# Patient Record
Sex: MALE | Race: WHITE | NOT HISPANIC OR LATINO | Employment: OTHER | URBAN - METROPOLITAN AREA
[De-identification: names, ages, dates, MRNs, and addresses within clinical notes are randomized per-mention and may not be internally consistent; named-entity substitution may affect disease eponyms.]

---

## 2021-06-16 PROBLEM — R00.1 SINUS BRADYCARDIA: Status: ACTIVE | Noted: 2018-04-22

## 2021-06-16 PROBLEM — R79.89 ABNORMAL LFTS: Status: ACTIVE | Noted: 2018-04-22

## 2021-06-16 PROBLEM — M71.10 SEPTIC BURSITIS: Status: ACTIVE | Noted: 2018-04-21

## 2023-01-16 ENCOUNTER — APPOINTMENT (OUTPATIENT)
Dept: CT IMAGING | Facility: HOSPITAL | Age: 68
End: 2023-01-16
Attending: EMERGENCY MEDICINE
Payer: COMMERCIAL

## 2023-01-16 ENCOUNTER — HOSPITAL ENCOUNTER (EMERGENCY)
Facility: HOSPITAL | Age: 68
Discharge: HOME | End: 2023-01-16
Attending: EMERGENCY MEDICINE | Admitting: EMERGENCY MEDICINE
Payer: COMMERCIAL

## 2023-01-16 DIAGNOSIS — N20.1 URETEROLITHIASIS: ICD-10-CM

## 2023-01-16 LAB
ALBUMIN UR-MCNC: 20 MG/DL
ANION GAP SERPL CALCULATED.3IONS-SCNC: 15 MMOL/L (ref 7–15)
APPEARANCE UR: CLEAR
BASOPHILS # BLD AUTO: 0 10E3/UL (ref 0–0.2)
BASOPHILS NFR BLD AUTO: 0 %
BILIRUB UR QL STRIP: NEGATIVE
BUN SERPL-MCNC: 28.8 MG/DL (ref 8–23)
CALCIUM SERPL-MCNC: 9.5 MG/DL (ref 8.8–10.2)
CHLORIDE SERPL-SCNC: 104 MMOL/L (ref 98–107)
COLOR UR AUTO: ABNORMAL
CREAT SERPL-MCNC: 1.97 MG/DL (ref 0.67–1.17)
DEPRECATED HCO3 PLAS-SCNC: 19 MMOL/L (ref 22–29)
EOSINOPHIL # BLD AUTO: 0.1 10E3/UL (ref 0–0.7)
EOSINOPHIL NFR BLD AUTO: 1 %
ERYTHROCYTE [DISTWIDTH] IN BLOOD BY AUTOMATED COUNT: 12.5 % (ref 10–15)
GFR SERPL CREATININE-BSD FRML MDRD: 36 ML/MIN/1.73M2
GLUCOSE SERPL-MCNC: 103 MG/DL (ref 70–99)
GLUCOSE UR STRIP-MCNC: NEGATIVE MG/DL
HCT VFR BLD AUTO: 44.6 % (ref 40–53)
HGB BLD-MCNC: 14.9 G/DL (ref 13.3–17.7)
HGB UR QL STRIP: ABNORMAL
IMM GRANULOCYTES # BLD: 0.1 10E3/UL
IMM GRANULOCYTES NFR BLD: 1 %
KETONES UR STRIP-MCNC: NEGATIVE MG/DL
LEUKOCYTE ESTERASE UR QL STRIP: NEGATIVE
LIPASE SERPL-CCNC: 38 U/L (ref 13–60)
LYMPHOCYTES # BLD AUTO: 1 10E3/UL (ref 0.8–5.3)
LYMPHOCYTES NFR BLD AUTO: 8 %
MCH RBC QN AUTO: 29.6 PG (ref 26.5–33)
MCHC RBC AUTO-ENTMCNC: 33.4 G/DL (ref 31.5–36.5)
MCV RBC AUTO: 89 FL (ref 78–100)
MONOCYTES # BLD AUTO: 1.1 10E3/UL (ref 0–1.3)
MONOCYTES NFR BLD AUTO: 9 %
MUCOUS THREADS #/AREA URNS LPF: PRESENT /LPF
NEUTROPHILS # BLD AUTO: 10.1 10E3/UL (ref 1.6–8.3)
NEUTROPHILS NFR BLD AUTO: 81 %
NITRATE UR QL: NEGATIVE
NRBC # BLD AUTO: 0 10E3/UL
NRBC BLD AUTO-RTO: 0 /100
PH UR STRIP: 5 [PH] (ref 5–7)
PLATELET # BLD AUTO: 154 10E3/UL (ref 150–450)
POTASSIUM SERPL-SCNC: 4.1 MMOL/L (ref 3.4–5.3)
RBC # BLD AUTO: 5.03 10E6/UL (ref 4.4–5.9)
RBC URINE: 3 /HPF
SODIUM SERPL-SCNC: 138 MMOL/L (ref 136–145)
SP GR UR STRIP: 1.02 (ref 1–1.03)
UROBILINOGEN UR STRIP-MCNC: <2 MG/DL
WBC # BLD AUTO: 12.4 10E3/UL (ref 4–11)
WBC URINE: <1 /HPF

## 2023-01-16 PROCEDURE — 96374 THER/PROPH/DIAG INJ IV PUSH: CPT

## 2023-01-16 PROCEDURE — 74176 CT ABD & PELVIS W/O CONTRAST: CPT

## 2023-01-16 PROCEDURE — 99285 EMERGENCY DEPT VISIT HI MDM: CPT | Mod: 25

## 2023-01-16 PROCEDURE — 85025 COMPLETE CBC W/AUTO DIFF WBC: CPT | Performed by: EMERGENCY MEDICINE

## 2023-01-16 PROCEDURE — 81001 URINALYSIS AUTO W/SCOPE: CPT | Performed by: EMERGENCY MEDICINE

## 2023-01-16 PROCEDURE — 36415 COLL VENOUS BLD VENIPUNCTURE: CPT | Performed by: EMERGENCY MEDICINE

## 2023-01-16 PROCEDURE — 80048 BASIC METABOLIC PNL TOTAL CA: CPT | Performed by: EMERGENCY MEDICINE

## 2023-01-16 PROCEDURE — 83690 ASSAY OF LIPASE: CPT | Performed by: EMERGENCY MEDICINE

## 2023-01-16 PROCEDURE — 250N000011 HC RX IP 250 OP 636: Performed by: EMERGENCY MEDICINE

## 2023-01-16 RX ORDER — MORPHINE SULFATE 4 MG/ML
4 INJECTION, SOLUTION INTRAMUSCULAR; INTRAVENOUS EVERY 30 MIN PRN
Status: DISCONTINUED | OUTPATIENT
Start: 2023-01-16 | End: 2023-01-16 | Stop reason: HOSPADM

## 2023-01-16 RX ORDER — OXYCODONE AND ACETAMINOPHEN 5; 325 MG/1; MG/1
1 TABLET ORAL EVERY 6 HOURS PRN
Qty: 12 TABLET | Refills: 0 | Status: SHIPPED | OUTPATIENT
Start: 2023-01-16 | End: 2023-01-19

## 2023-01-16 RX ADMIN — MORPHINE SULFATE 4 MG: 4 INJECTION, SOLUTION INTRAMUSCULAR; INTRAVENOUS at 10:30

## 2023-01-16 ASSESSMENT — ACTIVITIES OF DAILY LIVING (ADL)
ADLS_ACUITY_SCORE: 35

## 2023-01-16 ASSESSMENT — ENCOUNTER SYMPTOMS
VOMITING: 1
FLANK PAIN: 1
NAUSEA: 1

## 2023-01-16 NOTE — ED NOTES
Pt said that the pain is better. His wife is here. He said that he has a kidney disorder. He is not sure if he has had a kidney stone in the past or not.

## 2023-01-16 NOTE — ED TRIAGE NOTES
Pt brought in by EMS for rt flank pain stat started about 2 am. He took 2 old oxy's for the pain that helped but he has been very nauseated and he was given 8 mg of zofran for EMS. Pain still there and nausea is slightly better after meds.      Triage Assessment     Row Name 01/16/23 0845       Triage Assessment (Adult)    Airway WDL WDL       Respiratory WDL    Respiratory WDL WDL       Skin Circulation/Temperature WDL    Skin Circulation/Temperature WDL WDL       Cardiac WDL    Cardiac WDL X;rhythm    Pulse Rate & Regularity bradycardic       Peripheral/Neurovascular WDL    Peripheral Neurovascular WDL WDL       Cognitive/Neuro/Behavioral WDL    Cognitive/Neuro/Behavioral WDL WDL

## 2023-01-16 NOTE — DISCHARGE INSTRUCTIONS
Encourage fluids.  Heat to sore areas can help with pain.  Tylenol or ibuprofen can help with pain.  1 Percocet every 6 hours if needed for stronger pain.  Do not drive with this.  Have someone drive you home today.  Strain your urine and save any stones for analysis.  Referral to kidney [facility] [place], they should contact you for appointment in the next day or 2.  See your doctor return if worse or problems.

## 2023-01-16 NOTE — ED PROVIDER NOTES
EMERGENCY DEPARTMENT ENCOUNTER      NAME: Maurice Biswas  AGE: 67 year old male  YOB: 1955  MRN: [id number]  EVALUATION DATE & TIME: 2023  8:41 AM    PCP: No primary care provider on file.    ED PROVIDER: Dwain Tan M.D.      Chief Complaint   Patient presents with     Flank Pain     Flank pain.    FINAL IMPRESSION:  1.  Acute left flank pain.  2.  Left ureterolithiasis.    ED COURSE & MEDICAL DECISION MAKIN:52 AM I met with the patient to gather history and to perform my initial exam. We discussed plans for the ED course, including diagnostic testing and treatment. PPE worn: cloth mask.  Patient with 2 to 3 days of left flank pain.  No associated symptoms.  Nothing makes this better or worse.  12:46 PM I reevaluated and updated the patient.  Patient pain-free.  Patient was able to go home and prescription for Percocet and referral to kidney [facility].  They are in agreement with the plan.      Pertinent Labs & Imaging studies reviewed. (See chart for details)    67 year old male presents to the Emergency Department for evaluation of left flank pain.    At the conclusion of the encounter I discussed the results of all of the tests and the disposition. The questions were answered. The patient or family acknowledged understanding and was agreeable with the care plan.     Medical Decision Making    History:    Supplemental history from:     External Record(s) reviewed: In-patient and out-patient computer records reviewed.    Work Up:    Chart documentation includes differential considered and any EKGs or imaging independently interpreted by provider.  Differential diagnosis includes muscle strain or contusion, kidney problem, infection, abdominal bleeding, pancreatitis, etc.    In additional to work up documented, I considered the following work up: See chart documentation, if applicable.    External consultation:    Discussion of management with another provider: See chart  documentation, if applicable    Complicating factors:    Care impacted by chronic illness:     Care affected by social determinants of health: N/A    Disposition considerations: Anticipate probable discharge home.    MEDICATIONS GIVEN IN THE EMERGENCY:  Medications   morphine (PF) injection 4 mg (4 mg Intravenous Given 1/16/23 1030)       NEW PRESCRIPTIONS STARTED AT TODAY'S ER VISIT  New Prescriptions    No medications on file          =================================================================    HPI    Patient information was obtained from: Patient, RN    Use of : N/A        Maurice Biswas is a 67 year old male with a pertinent history of hypertension, CKD 3b who presents to this ED by EMS for evaluation of flank pain. Patient endorses stabbing left sided flank pain for the past 2 day persisting into today. No pain with deep inspiration. His pain does not radiate into his groin. This morning at ~2 AM, he took 2 old oxycodone with some relief to his pain. En route to the ED, he was given 8 mg Zofran by EMS. Patient had a partially filled emesis bag during the interview.    Does not identify any waxing or waning symptoms otherwise, exacerbating or alleviating features,associated symptoms except as mentioned.    REVIEW OF SYSTEMS   Review of Systems   Gastrointestinal: Positive for nausea and vomiting.   Genitourinary: Positive for flank pain (left sided).   All other systems reviewed and are negative.       PAST MEDICAL HISTORY:  History reviewed. No pertinent past medical history.    PAST SURGICAL HISTORY:  History reviewed. No pertinent surgical history.        CURRENT MEDICATIONS:    butalbital-acetaminophen-caffeine (FIORICET, ESGIC) -40 mg per tablet  fluticasone (FLONASE) 50 mcg/actuation nasal spray  levothyroxine (SYNTHROID, LEVOTHROID) 100 MCG tablet        ALLERGIES:  No Known Allergies    FAMILY HISTORY:  History reviewed. No pertinent family history.    SOCIAL HISTORY:   Social  History     Socioeconomic History     Marital status:      Spouse name: None     Number of children: None     Years of education: None     Highest education level: None   Tobacco Use     Smoking status: Never     Smokeless tobacco: Never   Substance and Sexual Activity     Alcohol use: No     Drug use: No     Sexual activity: Yes     Partners: Female   No drugs, alcohol, or tobacco.    VITALS:  There were no vitals taken for this visit.    PHYSICAL EXAM    Vital Signs:  There were no vitals taken for this visit.  General:  On entering the room he is in no apparent distress.    Neck:  Neck supple with full range of motion and nontender.    Back:  Back and spine are nontender.  No costovertebral angle tenderness.  No CVA angle tenderness, there is left flank pain.  HEENT:  Oropharynx clear with moist mucous membranes.  HEENT unremarkable.    Pulmonary:  Chest clear to auscultation without rhonchi rales or wheezing.    Cardiovascular:  Cardiac regular rate and rhythm without murmurs rubs or gallops.    Abdomen:  Abdomen soft nontender.  There is no rebound or guarding.    Muskuloskeletal:  he moves all 4 without any difficulty and has normal neurovascular exams.  Extremities without clubbing, cyanosis, or edema.  Legs and calves are nontender.    Neuro:  he is alert and oriented ×3 and moves all extremities symmetrically.    Psych:  Normal affect.    Skin:  Unremarkable and warm and dry.       LAB:  All pertinent labs reviewed and interpreted.  Labs Ordered and Resulted from Time of ED Arrival to Time of ED Departure   ROUTINE UA WITH MICROSCOPIC REFLEX TO CULTURE - Abnormal       Result Value    Color Urine Light Yellow      Appearance Urine Clear      Glucose Urine Negative      Bilirubin Urine Negative      Ketones Urine Negative      Specific Gravity Urine 1.022      Blood Urine 0.03 mg/dL (*)     pH Urine 5.0      Protein Albumin Urine 20 (*)     Urobilinogen Urine <2.0      Nitrite Urine Negative       Leukocyte Esterase Urine Negative      Mucus Urine Present (*)     RBC Urine 3 (*)     WBC Urine <1     BASIC METABOLIC PANEL - Abnormal    Sodium 138      Potassium 4.1      Chloride 104      Carbon Dioxide (CO2) 19 (*)     Anion Gap 15      Urea Nitrogen 28.8 (*)     Creatinine 1.97 (*)     Calcium 9.5      Glucose 103 (*)     GFR Estimate 36 (*)    CBC WITH PLATELETS AND DIFFERENTIAL - Abnormal    WBC Count 12.4 (*)     RBC Count 5.03      Hemoglobin 14.9      Hematocrit 44.6      MCV 89      MCH 29.6      MCHC 33.4      RDW 12.5      Platelet Count 154      % Neutrophils 81      % Lymphocytes 8      % Monocytes 9      % Eosinophils 1      % Basophils 0      % Immature Granulocytes 1      NRBCs per 100 WBC 0      Absolute Neutrophils 10.1 (*)     Absolute Lymphocytes 1.0      Absolute Monocytes 1.1      Absolute Eosinophils 0.1      Absolute Basophils 0.0      Absolute Immature Granulocytes 0.1      Absolute NRBCs 0.0     LIPASE - Normal    Lipase 38         RADIOLOGY:  Reviewed all pertinent imaging. Please see official radiology report.  Abd/pelvis CT no contrast - Stone Protocol   Final Result   IMPRESSION:    1.  2 mm stone at the left ureterovesical junction causes mild hydronephrosis.   2.  Colonic diverticulosis.                    EKG:    Sinus bradycardia 49, otherwise normal EKG.  Compared to April 2018 that showed a similar rhythm with heart rate of 54.  Chart indicates patient has a history of sinus bradycardia.    I have independently reviewed and interpreted the EKG(s) documented above.    PROCEDURES:         I, Obie Slaughter, am serving as a scribe to document services personally performed by Dr. Tan based on my observation and the provider's statements to me. I, Dwain Tan MD attest that Obie Slaughter is acting in a scribe capacity, has observed my performance of the services and has documented them in accordance with my direction.    Dwain Tan M.D.  Emergency  Medicine  [facility] EMERGENCY DEPARTMENT  [address] [place] [id number]  [phone]  Dept: [phone]     Dwain Tan MD  01/16/23 1250

## 2023-01-16 NOTE — ED NOTES
Bed: JNED35  Expected date: 1/16/23  Expected time: 8:17 AM  Means of arrival:   Comments:  Flank/hip pain/[place]

## 2023-08-29 ENCOUNTER — TRANSCRIBE ORDERS (OUTPATIENT)
Dept: OTHER | Age: 68
End: 2023-08-29

## 2023-08-29 DIAGNOSIS — G70.00 MYASTHENIA GRAVIS (H): Primary | ICD-10-CM

## 2023-08-30 ENCOUNTER — HOSPITAL ENCOUNTER (INPATIENT)
Facility: HOSPITAL | Age: 68
LOS: 6 days | Discharge: HOME | DRG: 057 | End: 2023-09-05
Attending: EMERGENCY MEDICINE | Admitting: EMERGENCY MEDICINE
Payer: COMMERCIAL

## 2023-08-30 DIAGNOSIS — I10 PRIMARY HYPERTENSION: Primary | ICD-10-CM

## 2023-08-30 DIAGNOSIS — G70.00 MYASTHENIA GRAVIS (H): ICD-10-CM

## 2023-08-30 LAB
ALBUMIN SERPL BCG-MCNC: 4.5 G/DL (ref 3.5–5.2)
ALP SERPL-CCNC: 59 U/L (ref 40–129)
ALT SERPL W P-5'-P-CCNC: 49 U/L (ref 0–70)
ANION GAP SERPL CALCULATED.3IONS-SCNC: 12 MMOL/L (ref 7–15)
AST SERPL W P-5'-P-CCNC: 28 U/L (ref 0–45)
BASOPHILS # BLD AUTO: 0 10E3/UL (ref 0–0.2)
BASOPHILS NFR BLD AUTO: 0 %
BILIRUB SERPL-MCNC: 1.1 MG/DL
BUN SERPL-MCNC: 31.4 MG/DL (ref 8–23)
CALCIUM SERPL-MCNC: 9.5 MG/DL (ref 8.8–10.2)
CHLORIDE SERPL-SCNC: 103 MMOL/L (ref 98–107)
CREAT SERPL-MCNC: 1.36 MG/DL (ref 0.67–1.17)
DEPRECATED HCO3 PLAS-SCNC: 24 MMOL/L (ref 22–29)
EOSINOPHIL # BLD AUTO: 0 10E3/UL (ref 0–0.7)
EOSINOPHIL NFR BLD AUTO: 0 %
ERYTHROCYTE [DISTWIDTH] IN BLOOD BY AUTOMATED COUNT: 12.8 % (ref 10–15)
GFR SERPL CREATININE-BSD FRML MDRD: 56 ML/MIN/1.73M2
GLUCOSE BLDC GLUCOMTR-MCNC: 117 MG/DL (ref 70–99)
GLUCOSE SERPL-MCNC: 222 MG/DL (ref 70–99)
HBA1C MFR BLD: 5.4 %
HCT VFR BLD AUTO: 47.7 % (ref 40–53)
HGB BLD-MCNC: 15.2 G/DL (ref 13.3–17.7)
IMM GRANULOCYTES # BLD: 0 10E3/UL
IMM GRANULOCYTES NFR BLD: 0 %
LYMPHOCYTES # BLD AUTO: 0.4 10E3/UL (ref 0.8–5.3)
LYMPHOCYTES NFR BLD AUTO: 5 %
MCH RBC QN AUTO: 29 PG (ref 26.5–33)
MCHC RBC AUTO-ENTMCNC: 31.9 G/DL (ref 31.5–36.5)
MCV RBC AUTO: 91 FL (ref 78–100)
MONOCYTES # BLD AUTO: 0.1 10E3/UL (ref 0–1.3)
MONOCYTES NFR BLD AUTO: 2 %
NEUTROPHILS # BLD AUTO: 8.3 10E3/UL (ref 1.6–8.3)
NEUTROPHILS NFR BLD AUTO: 93 %
NRBC # BLD AUTO: 0 10E3/UL
NRBC BLD AUTO-RTO: 0 /100
PLATELET # BLD AUTO: 212 10E3/UL (ref 150–450)
POTASSIUM SERPL-SCNC: 4.6 MMOL/L (ref 3.4–5.3)
PROT SERPL-MCNC: 6.9 G/DL (ref 6.4–8.3)
RBC # BLD AUTO: 5.24 10E6/UL (ref 4.4–5.9)
SODIUM SERPL-SCNC: 139 MMOL/L (ref 136–145)
WBC # BLD AUTO: 8.9 10E3/UL (ref 4–11)

## 2023-08-30 PROCEDURE — 999N000157 HC STATISTIC RCP TIME EA 10 MIN

## 2023-08-30 PROCEDURE — 99223 1ST HOSP IP/OBS HIGH 75: CPT | Performed by: EMERGENCY MEDICINE

## 2023-08-30 PROCEDURE — 99223 1ST HOSP IP/OBS HIGH 75: CPT | Performed by: PSYCHIATRY & NEUROLOGY

## 2023-08-30 PROCEDURE — 120N000001 HC R&B MED SURG/OB

## 2023-08-30 PROCEDURE — 83036 HEMOGLOBIN GLYCOSYLATED A1C: CPT | Performed by: EMERGENCY MEDICINE

## 2023-08-30 PROCEDURE — 85004 AUTOMATED DIFF WBC COUNT: CPT | Performed by: EMERGENCY MEDICINE

## 2023-08-30 PROCEDURE — 82962 GLUCOSE BLOOD TEST: CPT

## 2023-08-30 PROCEDURE — 94150 VITAL CAPACITY TEST: CPT

## 2023-08-30 PROCEDURE — 99285 EMERGENCY DEPT VISIT HI MDM: CPT

## 2023-08-30 PROCEDURE — 36415 COLL VENOUS BLD VENIPUNCTURE: CPT | Performed by: EMERGENCY MEDICINE

## 2023-08-30 PROCEDURE — 80053 COMPREHEN METABOLIC PANEL: CPT | Performed by: EMERGENCY MEDICINE

## 2023-08-30 PROCEDURE — 250N000013 HC RX MED GY IP 250 OP 250 PS 637: Performed by: EMERGENCY MEDICINE

## 2023-08-30 RX ORDER — ASPIRIN 81 MG/1
81 TABLET ORAL DAILY
Status: DISCONTINUED | OUTPATIENT
Start: 2023-08-31 | End: 2023-09-05 | Stop reason: HOSPADM

## 2023-08-30 RX ORDER — B-COMPLEX WITH VITAMIN C
1 TABLET ORAL 2 TIMES DAILY WITH MEALS
COMMUNITY
End: 2024-07-01

## 2023-08-30 RX ORDER — HEPARIN SODIUM 5000 [USP'U]/.5ML
5000 INJECTION, SOLUTION INTRAVENOUS; SUBCUTANEOUS EVERY 12 HOURS
Status: DISCONTINUED | OUTPATIENT
Start: 2023-08-30 | End: 2023-09-05 | Stop reason: HOSPADM

## 2023-08-30 RX ORDER — LOSARTAN POTASSIUM 100 MG/1
100 TABLET ORAL DAILY
Status: ON HOLD | COMMUNITY
End: 2023-09-05

## 2023-08-30 RX ORDER — PYRIDOSTIGMINE BROMIDE 60 MG/1
1 TABLET ORAL 3 TIMES DAILY
COMMUNITY
End: 2023-09-21

## 2023-08-30 RX ORDER — PREDNISONE 20 MG/1
60 TABLET ORAL DAILY
Status: DISCONTINUED | OUTPATIENT
Start: 2023-08-31 | End: 2023-09-05 | Stop reason: HOSPADM

## 2023-08-30 RX ORDER — NICOTINE POLACRILEX 4 MG
15-30 LOZENGE BUCCAL
Status: DISCONTINUED | OUTPATIENT
Start: 2023-08-30 | End: 2023-09-05 | Stop reason: HOSPADM

## 2023-08-30 RX ORDER — DEXTROSE MONOHYDRATE 25 G/50ML
25-50 INJECTION, SOLUTION INTRAVENOUS
Status: DISCONTINUED | OUTPATIENT
Start: 2023-08-30 | End: 2023-09-05 | Stop reason: HOSPADM

## 2023-08-30 RX ORDER — PREDNISONE 20 MG/1
3 TABLET ORAL DAILY
Status: ON HOLD | COMMUNITY
Start: 2023-08-24 | End: 2023-09-05

## 2023-08-30 RX ORDER — LOSARTAN POTASSIUM 50 MG/1
100 TABLET ORAL DAILY
Status: DISCONTINUED | OUTPATIENT
Start: 2023-08-31 | End: 2023-08-31

## 2023-08-30 RX ORDER — AMLODIPINE BESYLATE 5 MG/1
5 TABLET ORAL DAILY
Status: DISCONTINUED | OUTPATIENT
Start: 2023-08-31 | End: 2023-08-31

## 2023-08-30 RX ORDER — LEVOTHYROXINE SODIUM 112 UG/1
112 TABLET ORAL DAILY
COMMUNITY
Start: 2023-08-03

## 2023-08-30 RX ORDER — ASPIRIN 81 MG/1
81 TABLET ORAL DAILY
COMMUNITY
End: 2024-01-10

## 2023-08-30 RX ORDER — CHLORAL HYDRATE 500 MG
2 CAPSULE ORAL 2 TIMES DAILY
COMMUNITY
Start: 2022-08-01

## 2023-08-30 RX ORDER — LEVOTHYROXINE SODIUM 112 UG/1
112 TABLET ORAL DAILY
Status: DISCONTINUED | OUTPATIENT
Start: 2023-08-31 | End: 2023-09-05 | Stop reason: HOSPADM

## 2023-08-30 RX ORDER — LIDOCAINE 40 MG/G
CREAM TOPICAL
Status: DISCONTINUED | OUTPATIENT
Start: 2023-08-30 | End: 2023-09-05 | Stop reason: HOSPADM

## 2023-08-30 RX ORDER — PYRIDOSTIGMINE BROMIDE 60 MG/1
60 TABLET ORAL 3 TIMES DAILY
Status: DISCONTINUED | OUTPATIENT
Start: 2023-08-30 | End: 2023-09-05 | Stop reason: HOSPADM

## 2023-08-30 RX ORDER — AMLODIPINE BESYLATE 5 MG/1
5 TABLET ORAL DAILY
Status: ON HOLD | COMMUNITY
End: 2023-09-05

## 2023-08-30 RX ADMIN — PYRIDOSTIGMINE BROMIDE 60 MG: 60 TABLET ORAL at 21:36

## 2023-08-30 ASSESSMENT — ACTIVITIES OF DAILY LIVING (ADL)
ADLS_ACUITY_SCORE: 35

## 2023-08-30 NOTE — CONSULTS
This is a neurological consultation    68-year-old admitted to hospital 8/30/2023    Chief complaint  Myasthenia gravis crisis      HPI  68-year-old presents to ER 8/30/2023 with increasing symptoms from his myasthenia gravis.  Has had the diagnosis for at least 2 years followed at the [facility]  Recently seen at the [facility] day before admission has been on prednisone 60 mg for couple of days.    Patient had increasing fatigue during the month of August has seen his primary regard to this.    Over the last week he has had difficulty with taking deep breaths  He has some swallowing difficulties  He has had some ptosis bilaterally left eye worse than right  He also has diplopia    Has difficulty taking a deep breath    Able to talk in long sentences  Able to swallow okay at bedside      Followed by Dr. Mcfarlane of [facility] [place] ([phone] for kidney disease  CKD 3a, Ig A nephropathy. 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  With these symptoms he has moved to more generalized myasthenia gravis    He was started on steroids fairly recently in August and ramped up fairly quickly  In some patients they can get slight worsening of their myasthenia gravis before it improves with a quick increase in prednisone.    Recommend short course of plasma exchange to bring things under control        Past medical history  Myasthenia gravis (November 2021)  CKD IgA nephropathy  Hypertension  Hypothyroidism  Prostatism/prostate cancer  Anemia  Psoriasis  Kidney  stone  Adenomatous polyp    Habits  Past smoker quit  Does not drink alcohol        Family history  Father colon cancer  Mother heart disease/hypertension  Maternal aunt heart disease  Sister hypertension      Work-up           8/30/2023  NIF       -40  VC       2.38    Labs  Sodium 139 potassium 4.6  BUN 31.4, creatinine 1.36  AST 28  Glucose 222  WBC 8.9, hemoglobin 15.2, platelets 212,000      Exam  Blood pressure 165/79, pulse 65, temperature 97.7  Blood pressure range 118//79  Pulse range 58-88  Tmax 97.7      Review of systems  Predominant positives and negatives  Generalized fatigue varies  Shortness of breath air hunger  Swallowing difficulties  Increased diplopia  Increased ptosis left greater than right    Maybe a little bit of chest pain  No actual headache    Has had increased saliva and diarrhea since being on increased doses of Mestinon which can be a side effect    Able to ambulate  Otherwise review of systems negative    General exam  Alert orient x3  HEENT significant ptosis left greater than right with some ophthalmoplegia  Lungs clear  Heart rate regular  Abdomen soft  Symmetrical pulses  No edema in the feet        Neurologic exam  Alert orient x3  Normal prosody of speech  Normal naming  Normal comprehension  Normal repetition  No aphasia  No neglect  Memory recall okay    Cranials 2 through 12  Significant left ptosis greater than right  Positive ophthalmoplegia brought out with fatiguing movements  Mild weakness of eye closure left greater than right  Mouth closure strong    Tongue twisters actually pretty good  No visual field cut    Neck flexors strong  Neck extensors strong    Upper extremities reported right over left  Deltoid 5/5  Biceps 5/5  Triceps 5/5  Wrist/finger extensors 5/5  Wrist/finger flexors 5/5  Intrinsic hand strength 5/5    No drift no tremor normal rapid alternating movements    Lower extremities reported right over left  Iliopsoas 5/5  Quadriceps 5/5  Hamstring  5/5  Anterior tibial 5/5    Gait  Able to stand and march in place          Assessment/plan    Myasthenia gravis exacerbation       Onset November 2021/diagnosis at the [facility]    2.  CKD stage III with IgA nephropathy with moderate interstitial fibrosis and tubular atrophy on previous biopsy       Concern with above renal disease patient not a good candidate for IV IgG.    Diagnosis  Myasthenia gravis exacerbation  CKD stage III with IgA nephropathy      Patient with recent conversion from relatively pure ocular myasthenia gravis to more generalized form.  August 2023 developed fatigue/shortness of breath/dysphagia along with increasing diplopia and ptosis    Currently though talks in long sentences  Question whether some of the exacerbation of his ptosis was from ramping up the prednisone so quick    Due to the air hunger though and fatigue would recommend a short course of plasma exchange    Plasma exchange x3 every other day and then consider once per month x3 months    Consider starting CellCept 500 mg twice daily as this may take 3 months to start working    While in hospital continue with prednisone 60 mg every morning  Continue with Mestinon 60 mg 3 times daily  Can back off if increase saliva or diarrhea problem  For diarrhea difficulties could use some Lomotil      Plan  Consult Dr. Mcfarlane of [facility] [place] for plasma exchange to help with myasthenia exacerbation.  Plasma exchange every other day x3 then once per month x3 months      May need more long-term immunosuppression with medication such as CellCept but these take time to take effect sometimes up to 3 months.  Consider starting CellCept    Recently had steroids increased which seems to be exacerbating some underlying glucose control primary to help with glucose control    Follow the NIF/VC    My partner Dr. Phu gillis will follow-up tomorrow    Discussed face-to-face with primary MD  Discussed with the ER physician  several times during presentation    80 minutes total care time today.

## 2023-08-30 NOTE — ED NOTES
RESPIRATORY CARE NOTE     Patient Name: Maurice Biswas  Today's Date: 8/30/2023       Pt is currently on RA and saturating well above 95%, RR 20. Both VC and NIF done per MD request. Pt was able to achieve 2.38 L, NIF -40 to -45 with encouragements.     Jasmin Lanier RRT

## 2023-08-30 NOTE — ED PROVIDER NOTES
EMERGENCY DEPARTMENT ENCOUNTER     NAME: Maurice Biswas   AGE: 68 year old male   YOB: 1955   MRN: [id number]   EVALUATION DATE & TIME: 8/30/2023  3:12 PM   PCP: Esa Valdivia     Chief Complaint   Patient presents with    Dysphagia    Swelling in Eyes   :    FINAL IMPRESSION       1. Myasthenia gravis (H)           ED COURSE & MEDICAL DECISION MAKING    3:16 PM Met with patient for initial interview and exam. His spouse is at bedside.  3:31 PM Paged neurology.  3:42 PM Discussed patient with Dr. Alamo, neurology.  3:55 PM Paged [facility].  4:38 PM Re-paged Neurology.  4:43 PM Discussed patient with Dr. Alamo neurology.  5:00 PM Rechecked patient. Updated on discussion with Neurology.  5:32 PM Rechecked patient. Discussed plans for admission.  5:34 PM Paged hospitalist.  5:52 PM Spoke to Dr. Alamo with Neurology.  5:56 PM Discussed with Dr. Hampton, hospitalist, who accepts patient for admission.  5:59 PM Rechecked patient.    Pertinent Labs & Imaging studies reviewed. (See chart for details)   68 year old male  presents to the Emergency Department for evaluation of dysphagia and swelling in eyes. Initial Vitals Reviewed. Initial exam notable for well-appearing male who does have ptosis of his left eye but does not appear dyspneic and is tolerating his secretions and saliva.  He is able to speak in full paragraphs without looking winded.  Unfortunately, we attempted to reach the [facility] for over an hour unsuccessfully as this is his primary neurologic team.  I therefore discussed the case with our team, and the most unfortunate thing is that we cannot see any of the records.  It sounds like they were thinking IVIG would be the next step.  Neurology is not certain whether this could be a worsening before it gets better from being on high-dose steroids or is actually a step towards worsening of a myasthenia crisis.  Ultimately, neurology would like him to be admitted to the  hospital for neurologic consultation and further steps, likely initiation of IVIG.  We did have a Kniffen vital capacity done which are normal and do not show a need for the ICU at this time with a neph of -85 and a vital capacity of 2.38 L.  Patient and his wife are actually in the process of trying to switch to our neurology team and had an outpatient referral placed, so they are quite comfortable being seen in the hospital here by our team, and I discussed the case with hospitalist Dr. Hampton who excepted the patient for admission.           At the conclusion of the encounter I discussed the results of all of the tests and the disposition. The questions were answered. The patient or family acknowledged understanding and was agreeable with the care plan.     0 minutes critical care time, see procedure note below for details if relevant    Medical Decision Making    History:  Supplemental history from: Family Member/Significant Other, spouse  External Record(s) reviewed: Outpatient Record: 08/29/2023 Adult Neurology  Referral    Work Up:  Chart documentation includes differential considered and any EKGs or imaging independently interpreted by provider, where specified.  In additional to work up documented, I considered the following work up: Documented in chart, if applicable.    External consultation:  Discussion of management with another provider: Hospitalist and Neurology    Complicating factors:  Care impacted by chronic illness: Chronic Kidney Disease, Hypertension, and Other: Myasthenia gravis  Care affected by social determinants of health: access to care    Disposition considerations: Admit.        MEDICATIONS GIVEN IN THE EMERGENCY:   Medications - No data to display   NEW PRESCRIPTIONS STARTED AT TODAY'S ER VISIT   New Prescriptions    No medications on file     ================================================================   HISTORY OF PRESENT ILLNESS       Patient information was obtained  from: Patient and his spouse.   Use of Intrepreter: N/A    Mauriec Biswas is a 68 year old male with history of Myasthenia gravis,  stage 3 chronic kidney disease, anemia, hypertension, prostate cancer, and hypothyroidism who presents with dysphagia and swelling in eyes.     The patient states that he had an appointment with [facility] in [place] yesterday and felt normal until last night, when he developed trouble swallowing and difficulty getting a full breath. He reports left eye drooping. The patient states that he called his neurologist today and they recommended that he go to the ED.     He reports that he is taking 60 mg prednisone for the past 6 days and it has not helped. He states that he had labs done yesterday at his visit.    Per chart review, the patient was referred to Adult Neurology on 08/29/2023 for treatment of his Myasthenia gravis.    ================================================================        PAST HISTORY     PAST MEDICAL HISTORY:   No past medical history on file.   PAST SURGICAL HISTORY:   Past Surgical History:   Procedure Laterality Date    IR RENAL BIOPSY LEFT  8/20/2018      CURRENT MEDICATIONS:   amLODIPine (NORVASC) 5 MG tablet  aspirin 81 MG EC tablet  Calcium Carbonate-Vitamin D (OYSTER SHELL CALCIUM/D) 500-5 MG-MCG TABS  fish oil-omega-3 fatty acids 1000 MG capsule  fluticasone (FLONASE) 50 mcg/actuation nasal spray  levothyroxine (SYNTHROID/LEVOTHROID) 112 MCG tablet  losartan (COZAAR) 100 MG tablet  predniSONE (DELTASONE) 20 MG tablet  pyRIDostigmine (MESTINON) 60 MG tablet      ALLERGIES:   Allergies   Allergen Reactions    Doxycycline Muscle Pain (Myalgia) and Other (See Comments)     Brought on Myasthenia Gravis      FAMILY HISTORY:   No family history on file.   SOCIAL HISTORY:   Social History     Socioeconomic History    Marital status:    Tobacco Use    Smoking status: Never    Smokeless tobacco: Never   Substance and Sexual Activity    Alcohol use: No     "Drug use: No    Sexual activity: Yes     Partners: Female        VITALS  Patient Vitals for the past 24 hrs:   BP Temp Temp src Pulse Resp SpO2 Height Weight   08/30/23 1730 (!) 165/79 -- -- 65 (!) 31 97 % -- --   08/30/23 1715 (!) 151/78 -- -- 63 25 97 % -- --   08/30/23 1700 (!) 157/83 -- -- 69 28 96 % -- --   08/30/23 1645 125/66 -- -- 58 20 95 % -- --   08/30/23 1630 118/66 -- -- 61 22 95 % -- --   08/30/23 1615 126/68 -- -- 61 22 94 % -- --   08/30/23 1600 120/67 -- -- 62 24 94 % -- --   08/30/23 1545 129/64 -- -- 64 23 95 % -- --   08/30/23 1530 (!) 145/74 -- -- 66 23 93 % -- --   08/30/23 1523 (!) 150/73 -- -- 67 24 95 % -- --   08/30/23 1503 (!) 159/81 97.7  F (36.5  C) Oral 80 18 96 % 1.803 m (5' 11\") 86.1 kg (189 lb 14.4 oz)        ================================================================    PHYSICAL EXAM     VITAL SIGNS: BP (!) 165/79   Pulse 65   Temp 97.7  F (36.5  C) (Oral)   Resp (!) 31   Ht 1.803 m (5' 11\")   Wt 86.1 kg (189 lb 14.4 oz)   SpO2 97%   BMI 26.49 kg/m     Constitutional:  Awake, no acute distress   HENT:  Atraumatic, oropharynx without exudate or erythema, membranes moist  Lymph:  No adenopathy  Eyes: EOM intact, PERRL, no injection. Left ptosis.  Neck: Supple  Respiratory:  Clear to auscultation bilaterally, no wheezes or crackles   Cardiovascular:  Regular rate and rhythm, single S1 and S2   GI:  Soft, nontender, nondistended, no rebound or guarding   Musculoskeletal:  Moves all extremities, no lower extremity edema, no deformities    Skin:  Warm, dry  Neurologic:  Alert and oriented x3, no focal deficits noted       ================================================================  LAB       All pertinent labs reviewed and interpreted.   Labs Ordered and Resulted from Time of ED Arrival to Time of ED Departure   COMPREHENSIVE METABOLIC PANEL - Abnormal       Result Value    Sodium 139      Potassium 4.6      Chloride 103      Carbon Dioxide (CO2) 24      Anion Gap 12      " Urea Nitrogen 31.4 (*)     Creatinine 1.36 (*)     Calcium 9.5      Glucose 222 (*)     Alkaline Phosphatase 59      AST 28      ALT 49      Protein Total 6.9      Albumin 4.5      Bilirubin Total 1.1      GFR Estimate 56 (*)    CBC WITH PLATELETS AND DIFFERENTIAL - Abnormal    WBC Count 8.9      RBC Count 5.24      Hemoglobin 15.2      Hematocrit 47.7      MCV 91      MCH 29.0      MCHC 31.9      RDW 12.8      Platelet Count 212      % Neutrophils 93      % Lymphocytes 5      % Monocytes 2      % Eosinophils 0      % Basophils 0      % Immature Granulocytes 0      NRBCs per 100 WBC 0      Absolute Neutrophils 8.3      Absolute Lymphocytes 0.4 (*)     Absolute Monocytes 0.1      Absolute Eosinophils 0.0      Absolute Basophils 0.0      Absolute Immature Granulocytes 0.0      Absolute NRBCs 0.0          ===============================================================  RADIOLOGY       Reviewed all pertinent imaging. Please see official radiology report.   No orders to display         ================================================================  EKG         I have independently reviewed and interpreted the EKG(s) documented above.     ================================================================  PROCEDURES         I, Elsy Javed, am serving as a scribe to document services personally performed by Dr. Babcock based on my observation and the provider's statements to me. I, Ofelia Babcock MD attest that Elsy Javed is acting in a scribe capacity, has observed my performance of the services and has documented them in accordance with my direction.   Ofelia Babcock M.D.   Emergency Medicine   [facility] EMERGENCY DEPARTMENT  [address] [place] [id number] [phone]  Dept: [phone]      Ofelia Babcock MD  08/30/23 1225

## 2023-08-30 NOTE — PHARMACY-ADMISSION MEDICATION HISTORY
Pharmacist Admission Medication History    Admission medication history is complete. The information provided in this note is only as accurate as the sources available at the time of the update.    Medication reconciliation/reorder completed by provider prior to medication history? No    Information Source(s): Patient, Family member, and CareEverywhere/SureScripts via in-person    Pertinent Information: N/A    Changes made to PTA medication list:  Added: Baby aspirin, vitamin D, losartan, amlodipine, prednisone, omega-3, pyridostigmine  Deleted: Fioricet  Changed: Levothyroxine 100mcg to 112mcg    Allergies reviewed with patient and updates made in EHR: yes    Medication History Completed By: BALBINA FABIAN [facility] 8/30/2023 5:53 PM    Prior to Admission medications    Medication Sig Last Dose Taking? Auth Provider Long Term End Date   amLODIPine (NORVASC) 5 MG tablet Take 5 mg by mouth daily 8/30/2023 at am Yes Unknown, Entered By History Yes    aspirin 81 MG EC tablet Take 81 mg by mouth daily Past Week at pm Yes Unknown, Entered By History     Calcium Carbonate-Vitamin D (OYSTER SHELL CALCIUM/D) 500-5 MG-MCG TABS Take 1 tablet by mouth 2 times daily (with meals) Past Month Yes Unknown, Entered By History     fish oil-omega-3 fatty acids 1000 MG capsule Take 2 capsules by mouth 2 times daily 8/30/2023 at am Yes Unknown, Entered By History     fluticasone (FLONASE) 50 mcg/actuation nasal spray [FLUTICASONE (FLONASE) 50 MCG/ACTUATION NASAL SPRAY] 2 sprays into each nostril daily as needed for rhinitis. 8/30/2023 at am Yes Provider, Historical     levothyroxine (SYNTHROID/LEVOTHROID) 112 MCG tablet Take 112 mcg by mouth daily 8/30/2023 at am Yes Unknown, Entered By History     losartan (COZAAR) 100 MG tablet Take 100 mg by mouth daily 8/30/2023 at am Yes Unknown, Entered By History Yes    predniSONE (DELTASONE) 20 MG tablet Take 3 tablets by mouth daily 8/30/2023 at am Yes Unknown, Entered By History  9/6/23    pyRIDostigmine (MESTINON) 60 MG tablet Take 1 tablet by mouth 3 times daily 8/30/2023 at afternoon Yes Unknown, Entered By History Yes

## 2023-08-30 NOTE — ED TRIAGE NOTES
"Patient arrives to triage from home with spouse with chief complaint of swelling in bilateral eyes, swallowing complaints and difficulty taking deep breaths for the past week.  Patient reports being on high doses of Prednisone for the past month due to his Myasthenia Gravis.  Patient has been wearing an eye patch due to double vision for the past month.  Left eye worse than right.  Chest pain reported around \"2/10\" pain, described as \"tight,\" non-radiating and located in medial chest.  Patient states \"I just can't get a deep breath in.\"  Alert and oriented x4.         "

## 2023-08-31 ENCOUNTER — APPOINTMENT (OUTPATIENT)
Dept: INTERVENTIONAL RADIOLOGY/VASCULAR | Facility: HOSPITAL | Age: 68
DRG: 057 | End: 2023-08-31
Attending: NURSE PRACTITIONER
Payer: COMMERCIAL

## 2023-08-31 LAB
ANION GAP SERPL CALCULATED.3IONS-SCNC: 7 MMOL/L (ref 7–15)
BUN SERPL-MCNC: 28.8 MG/DL (ref 8–23)
CALCIUM SERPL-MCNC: 9 MG/DL (ref 8.8–10.2)
CHLORIDE SERPL-SCNC: 105 MMOL/L (ref 98–107)
CREAT SERPL-MCNC: 1.18 MG/DL (ref 0.67–1.17)
DEPRECATED HCO3 PLAS-SCNC: 28 MMOL/L (ref 22–29)
GFR SERPL CREATININE-BSD FRML MDRD: 67 ML/MIN/1.73M2
GLUCOSE BLDC GLUCOMTR-MCNC: 143 MG/DL (ref 70–99)
GLUCOSE BLDC GLUCOMTR-MCNC: 146 MG/DL (ref 70–99)
GLUCOSE BLDC GLUCOMTR-MCNC: 93 MG/DL (ref 70–99)
GLUCOSE BLDC GLUCOMTR-MCNC: 96 MG/DL (ref 70–99)
GLUCOSE SERPL-MCNC: 84 MG/DL (ref 70–99)
HOLD SPECIMEN: NORMAL
POTASSIUM SERPL-SCNC: 4.3 MMOL/L (ref 3.4–5.3)
SODIUM SERPL-SCNC: 140 MMOL/L (ref 136–145)

## 2023-08-31 PROCEDURE — 80048 BASIC METABOLIC PNL TOTAL CA: CPT | Performed by: EMERGENCY MEDICINE

## 2023-08-31 PROCEDURE — C1769 GUIDE WIRE: HCPCS

## 2023-08-31 PROCEDURE — 250N000012 HC RX MED GY IP 250 OP 636 PS 637: Performed by: EMERGENCY MEDICINE

## 2023-08-31 PROCEDURE — 250N000011 HC RX IP 250 OP 636: Mod: JZ | Performed by: RADIOLOGY

## 2023-08-31 PROCEDURE — 36415 COLL VENOUS BLD VENIPUNCTURE: CPT | Performed by: EMERGENCY MEDICINE

## 2023-08-31 PROCEDURE — 250N000011 HC RX IP 250 OP 636: Mod: JZ | Performed by: EMERGENCY MEDICINE

## 2023-08-31 PROCEDURE — C1752 CATH,HEMODIALYSIS,SHORT-TERM: HCPCS

## 2023-08-31 PROCEDURE — 02H633Z INSERTION OF INFUSION DEVICE INTO RIGHT ATRIUM, PERCUTANEOUS APPROACH: ICD-10-PCS | Performed by: RADIOLOGY

## 2023-08-31 PROCEDURE — 250N000013 HC RX MED GY IP 250 OP 250 PS 637: Performed by: EMERGENCY MEDICINE

## 2023-08-31 PROCEDURE — 999N000157 HC STATISTIC RCP TIME EA 10 MIN

## 2023-08-31 PROCEDURE — 250N000009 HC RX 250: Performed by: EMERGENCY MEDICINE

## 2023-08-31 PROCEDURE — 120N000001 HC R&B MED SURG/OB

## 2023-08-31 PROCEDURE — 36556 INSERT NON-TUNNEL CV CATH: CPT

## 2023-08-31 PROCEDURE — 272N000500 HC NEEDLE CR2

## 2023-08-31 PROCEDURE — 99232 SBSQ HOSP IP/OBS MODERATE 35: CPT | Performed by: HOSPITALIST

## 2023-08-31 PROCEDURE — 99233 SBSQ HOSP IP/OBS HIGH 50: CPT | Performed by: PSYCHIATRY & NEUROLOGY

## 2023-08-31 PROCEDURE — 94150 VITAL CAPACITY TEST: CPT

## 2023-08-31 RX ORDER — NALOXONE HYDROCHLORIDE 0.4 MG/ML
0.4 INJECTION, SOLUTION INTRAMUSCULAR; INTRAVENOUS; SUBCUTANEOUS
Status: DISCONTINUED | OUTPATIENT
Start: 2023-08-31 | End: 2023-08-31 | Stop reason: HOSPADM

## 2023-08-31 RX ORDER — FENTANYL CITRATE 50 UG/ML
25-50 INJECTION, SOLUTION INTRAMUSCULAR; INTRAVENOUS EVERY 5 MIN PRN
Status: DISCONTINUED | OUTPATIENT
Start: 2023-08-31 | End: 2023-08-31 | Stop reason: HOSPADM

## 2023-08-31 RX ORDER — NALOXONE HYDROCHLORIDE 0.4 MG/ML
0.2 INJECTION, SOLUTION INTRAMUSCULAR; INTRAVENOUS; SUBCUTANEOUS
Status: DISCONTINUED | OUTPATIENT
Start: 2023-08-31 | End: 2023-08-31 | Stop reason: HOSPADM

## 2023-08-31 RX ORDER — FLUTICASONE PROPIONATE 50 MCG
1 SPRAY, SUSPENSION (ML) NASAL DAILY PRN
Status: DISCONTINUED | OUTPATIENT
Start: 2023-08-31 | End: 2023-09-05 | Stop reason: HOSPADM

## 2023-08-31 RX ORDER — AMLODIPINE BESYLATE 5 MG/1
10 TABLET ORAL DAILY
Status: DISCONTINUED | OUTPATIENT
Start: 2023-09-01 | End: 2023-09-05 | Stop reason: HOSPADM

## 2023-08-31 RX ORDER — HEPARIN SODIUM 1000 [USP'U]/ML
2500 INJECTION, SOLUTION INTRAVENOUS; SUBCUTANEOUS ONCE
Status: COMPLETED | OUTPATIENT
Start: 2023-08-31 | End: 2023-08-31

## 2023-08-31 RX ADMIN — LIDOCAINE HYDROCHLORIDE 1 ML: 10 INJECTION, SOLUTION INFILTRATION; PERINEURAL at 11:01

## 2023-08-31 RX ADMIN — HEPARIN SODIUM 2500 UNITS: 1000 INJECTION INTRAVENOUS; SUBCUTANEOUS at 11:09

## 2023-08-31 RX ADMIN — Medication 1 TABLET: at 17:07

## 2023-08-31 RX ADMIN — PYRIDOSTIGMINE BROMIDE 60 MG: 60 TABLET ORAL at 09:09

## 2023-08-31 RX ADMIN — LOSARTAN POTASSIUM 100 MG: 50 TABLET, FILM COATED ORAL at 09:09

## 2023-08-31 RX ADMIN — HEPARIN SODIUM 5000 UNITS: 10000 INJECTION, SOLUTION INTRAVENOUS; SUBCUTANEOUS at 20:35

## 2023-08-31 RX ADMIN — AMLODIPINE BESYLATE 5 MG: 5 TABLET ORAL at 09:09

## 2023-08-31 RX ADMIN — PYRIDOSTIGMINE BROMIDE 60 MG: 60 TABLET ORAL at 20:34

## 2023-08-31 RX ADMIN — PREDNISONE 60 MG: 20 TABLET ORAL at 09:09

## 2023-08-31 RX ADMIN — PYRIDOSTIGMINE BROMIDE 60 MG: 60 TABLET ORAL at 13:21

## 2023-08-31 ASSESSMENT — ACTIVITIES OF DAILY LIVING (ADL)
ADLS_ACUITY_SCORE: 18
DIFFICULTY_EATING/SWALLOWING: NO
ADLS_ACUITY_SCORE: 20
ADLS_ACUITY_SCORE: 18
DRESSING/BATHING_DIFFICULTY: NO
FALL_HISTORY_WITHIN_LAST_SIX_MONTHS: NO
ADLS_ACUITY_SCORE: 18
WEAR_GLASSES_OR_BLIND: NO
ADLS_ACUITY_SCORE: 18
ADLS_ACUITY_SCORE: 18
WALKING_OR_CLIMBING_STAIRS_DIFFICULTY: NO
CHANGE_IN_FUNCTIONAL_STATUS_SINCE_ONSET_OF_CURRENT_ILLNESS/INJURY: NO
DOING_ERRANDS_INDEPENDENTLY_DIFFICULTY: NO
ADLS_ACUITY_SCORE: 20
CONCENTRATING,_REMEMBERING_OR_MAKING_DECISIONS_DIFFICULTY: NO
ADLS_ACUITY_SCORE: 20
TOILETING_ISSUES: NO

## 2023-08-31 NOTE — PROGRESS NOTES
NEUROLOGY PROGRESS NOTE     ASSESSMENT & PLAN   Visit diagnosis: Myasthenia gravis exacerbation    69 yo man admitted for myasthenia gravis exacerbation. Fast titration of prednisone as OP    Nephrology consulted.appreciate guidance in initiating PLEX which will have to happen tomorrow because Maurice received his losartan this morning  Tunnel catheter placement today  Continue PTA Mestinon and prednisone for now  Plasma exchange every other day for 3 doses, then monthly for 3 months  Follow SUMANTH/VC  Consider CellCept initiation prior to discharge  Physical therapy/occupational therapy   Glucose management per Hospitalist (elevated 2/2 steroids)  Neurology will follow along    Neurology Discharge Planning:  TBD    Patient Active Problem List    Diagnosis Date Noted     Myasthenia gravis (H) 08/30/2023     Priority: Medium     Sinus bradycardia 04/22/2018     Priority: Medium     Abnormal LFTs 04/22/2018     Priority: Medium     Septic bursitis 04/21/2018     Priority: Medium     NILA (acute kidney injury) (H)      Priority: Medium     Anemia, unspecified type      Priority: Medium     Acquired hypothyroidism      Priority: Medium     Medical History  No past medical history on file.     SUBJECTIVE     8-year-old man followed through [facility] for ocular myasthenia gravis with increased breathing difficulties, swallowing difficulties, double vision and ptosis, presenting on 8/30/23.  Has been on prednisone 60 mg a couple of days prior to admission. Recent initiation of immunotherapy. Previously treated with mestinon.    Additional history of CKD, so not felt to be a good candidate for IVIG.    No acute events overnight.    Maurice tells me that he has been on the steroids for about a month as documented by Dr. Alamo with titrating dosing.  He says that the lower doses were not helping him from a breathing standpoint.  He was also having problems with swallow in recent weeks.  He says all of this is new to him, previously it  was just the ocular symptoms.  He says they were talking about his thymus as well when he met with his neurologist most recently.  He tells me he is still short of breath when he lays flat.     OBJECTIVE     Vital signs in last 24 hours  Temp:  [97.7  F (36.5  C)-98.2  F (36.8  C)] 98.1  F (36.7  C)  Pulse:  [50-80] 51  Resp:  [17-31] 20  BP: (118-188)/(64-94) 166/77  FiO2 (%):  [21 %] 21 %  SpO2:  [93 %-98 %] 98 %    Weight:   [unfilled]    Review of Systems   Pertinent items are noted in HPI.    General Physical Exam: Patient is alert and oriented x 3. Vital signs were reviewed and are documented in EMR.   Neurological Exam:  Mental status: Attentive, interactive  Speech: Fluent  Cranial nerves: No definite ptosis currently.  Rest of his cranial nerves appear to be normal  Motor: 5/5 in upper and lower extremities  Sensory: Intact  Coordination: No dysmetria with fine motor movements of the hands  Gait: Deferred for safety     DIAGNOSTIC STUDIES     Pertinent Radiology   Radiology Results: No imaging to review    Pertinent Labs   Lab Results: personally reviewed.   Recent Results (from the past 24 hour(s))   Comprehensive metabolic panel    Collection Time: 08/30/23  3:29 PM   Result Value Ref Range    Sodium 139 136 - 145 mmol/L    Potassium 4.6 3.4 - 5.3 mmol/L    Chloride 103 98 - 107 mmol/L    Carbon Dioxide (CO2) 24 22 - 29 mmol/L    Anion Gap 12 7 - 15 mmol/L    Urea Nitrogen 31.4 (H) 8.0 - 23.0 mg/dL    Creatinine 1.36 (H) 0.67 - 1.17 mg/dL    Calcium 9.5 8.8 - 10.2 mg/dL    Glucose 222 (H) 70 - 99 mg/dL    Alkaline Phosphatase 59 40 - 129 U/L    AST 28 0 - 45 U/L    ALT 49 0 - 70 U/L    Protein Total 6.9 6.4 - 8.3 g/dL    Albumin 4.5 3.5 - 5.2 g/dL    Bilirubin Total 1.1 <=1.2 mg/dL    GFR Estimate 56 (L) >60 mL/min/1.73m2   CBC with platelets and differential    Collection Time: 08/30/23  3:29 PM   Result Value Ref Range    WBC Count 8.9 4.0 - 11.0 10e3/uL    RBC Count 5.24 4.40 - 5.90 10e6/uL     Hemoglobin 15.2 13.3 - 17.7 g/dL    Hematocrit 47.7 40.0 - 53.0 %    MCV 91 78 - 100 fL    MCH 29.0 26.5 - 33.0 pg    MCHC 31.9 31.5 - 36.5 g/dL    RDW 12.8 10.0 - 15.0 %    Platelet Count 212 150 - 450 10e3/uL    % Neutrophils 93 %    % Lymphocytes 5 %    % Monocytes 2 %    % Eosinophils 0 %    % Basophils 0 %    % Immature Granulocytes 0 %    NRBCs per 100 WBC 0 <1 /100    Absolute Neutrophils 8.3 1.6 - 8.3 10e3/uL    Absolute Lymphocytes 0.4 (L) 0.8 - 5.3 10e3/uL    Absolute Monocytes 0.1 0.0 - 1.3 10e3/uL    Absolute Eosinophils 0.0 0.0 - 0.7 10e3/uL    Absolute Basophils 0.0 0.0 - 0.2 10e3/uL    Absolute Immature Granulocytes 0.0 <=0.4 10e3/uL    Absolute NRBCs 0.0 10e3/uL   Hemoglobin A1c    Collection Time: 08/30/23  3:29 PM   Result Value Ref Range    Hemoglobin A1C 5.4 <5.7 %   Glucose by meter    Collection Time: 08/30/23 10:11 PM   Result Value Ref Range    GLUCOSE BY METER POCT 117 (H) 70 - 99 mg/dL         HOSPITAL MEDICATIONS       amLODIPine  5 mg Oral Daily     aspirin  81 mg Oral Daily     calcium carbonate-vitamin D  1 tablet Oral BID w/meals     heparin ANTICOAGULANT  5,000 Units Subcutaneous Q12H     insulin aspart  1-3 Units Subcutaneous TID AC     insulin aspart  1-3 Units Subcutaneous At Bedtime     levothyroxine  112 mcg Oral Daily     losartan  100 mg Oral Daily     predniSONE  60 mg Oral Daily     pyRIDostigmine  60 mg Oral TID     sodium chloride (PF)  3 mL Intracatheter Q8H        Total time spent for face to face visit, reviewing labs/imaging studies, counseling and coordination of care was: 1 Hour. More than 50% of this time was spent on counseling and coordination of care.    Dragon software used in the dictation on this note.    Damaris Shrestha MD  [facility] [address], [address]  [place] [address]

## 2023-08-31 NOTE — PROGRESS NOTES
[facility]    Medicine Progress Note - Hospitalist Service    Date of Admission:  8/30/2023    Assessment & Plan                Maurice Biswas is a 68 year old male with history of hypertension, hypothyroidism and myasthenia gravis presented with increasing fatigue and difficulty taking a deep breath, starting on plasmapheresis. Hospital Day: 2       #Myasthenia gravis  -He follows with [facility] and had been started on prednisone 60 mg daily for couple days.  Also on Mestinon.  His symptoms continued to get gradually worse including increasing fatigue and difficulty taking deep breaths particularly with activity.  Has reported some mild difficulty swallowing but not currently and did pass a bedside swallow.    -Neurology recommended nephrology consult for plasma exchange. Patient had non-tunneled catheter placed today. Planning plasmapheresis every other day for 3 sessions, then once a month for 3 months thereafter. First session will be tomorrow  -continue home mestinon and prednisone  -considering addition of Cellcept  -NIF monitoring- not previously ordered and I did order this  -potentially could discharge in 2 days or so if NIF stable and plasmapheresis can be arranged as outpatient (but skeptical that this is likely to be arranged in time and may just end up staying here for all 3 treatments)    #Chronic kidney disease stage IIIa with IgA nephropathy  -Diagnosed on kidney biopsy August 2018.  Follows with Dr. Mcfarlane.   -Usual baseline appears to be between 1.6 and 2.3.  Is 1.18 today.   Nephrology following for plasma exchange as above.    #Hyperglycemia  -Likely from steroids.  No chart history of diabetes, A1c 5.4. Admission blood glucose 222.  On diabetic order set for closer monitoring. Low threshold to start NPH but today's BG within range so hold off for now.    #Hypothyroidism, home Synthroid  #HTN, home amlodipine increased. Losartan held per nephrology, some concern for  "anaphylactoid reaction risk with albumin needed for plasmapheresis.       DVT Prophylaxis: Moderate risk. SQH  Diet: Regular Diet Adult    [place] Catheter: Not present  Lines: PRESENT             Cardiac Monitoring: None  Code Status: Full Code      Clinically Significant Risk Factors Present on Admission                # Drug Induced Platelet Defect: home medication list includes an antiplatelet medication   # Hypertension: Home medication list includes antihypertensive(s)      # Overweight: Estimated body mass index is 26.49 kg/m  as calculated from the following:    Height as of this encounter: 1.803 m (5' 11\").    Weight as of this encounter: 86.1 kg (189 lb 14.4 oz).              Disposition Plan   Disposition: Home     Expected Discharge Date: 09/05/2023        Discharge Comments: plasma exchange every other day x3 treatments and NIF monitoring. possible for outpatient plasma exchange but probably need to stay here a couple days at least to ensure symptoms turn around     Medically ready to discharge today: No     The patient's care was discussed with the Patient and Patient's Family.    Khadra Salazar MD  Hospitalist Service  [facility]  Securely message with [employer] (more info)  Text page via [employer] Paging/Directory   ______________________________________________________________________      Physical Exam   Vital Signs: Temp: 98.6  F (37  C) Temp src: Oral BP: (!) 166/83 Pulse: 53   Resp: 16 SpO2: 95 % O2 Device: None (Room air)    Weight: 189 lbs 14.4 oz  General: in no apparent distress, non-toxic, and alert male lying in hospital bed oriented x3  HEENT: Head normocephalic atraumatic, oral mucosa moist. Sclerae anicteric  CV: Regular rhythm, normal rate, no murmurs  Resp: No wheezes, no rales or rhonchi, no focal consolidations  GI: Belly soft, nondistended, nontender, bowel sounds present  Skin: No rashes or lesions  Extremities: No peripheral edema  Psych: Normal affect, mood " euthymic  Neuro:  very subtle bilateral ptosis        Medical Decision Making               Data   Recent Results (from the past 12 hour(s))   Basic metabolic panel    Collection Time: 08/31/23  7:10 AM   Result Value Ref Range    Sodium 140 136 - 145 mmol/L    Potassium 4.3 3.4 - 5.3 mmol/L    Chloride 105 98 - 107 mmol/L    Carbon Dioxide (CO2) 28 22 - 29 mmol/L    Anion Gap 7 7 - 15 mmol/L    Urea Nitrogen 28.8 (H) 8.0 - 23.0 mg/dL    Creatinine 1.18 (H) 0.67 - 1.17 mg/dL    Calcium 9.0 8.8 - 10.2 mg/dL    Glucose 84 70 - 99 mg/dL    GFR Estimate 67 >60 mL/min/1.73m2   Extra Purple Top Tube    Collection Time: 08/31/23  7:10 AM   Result Value Ref Range    Hold Specimen JIC    Glucose by meter    Collection Time: 08/31/23  8:49 AM   Result Value Ref Range    GLUCOSE BY METER POCT 96 70 - 99 mg/dL   Glucose by meter    Collection Time: 08/31/23 11:26 AM   Result Value Ref Range    GLUCOSE BY METER POCT 93 70 - 99 mg/dL     Interval History     Patient states doing okay today.  Tolerated catheter placement well.  Denies any pain or shortness of breath.  Typically has more ptosis in the evening time doing pretty well.  Understands he is not ready for discharge.

## 2023-08-31 NOTE — UTILIZATION REVIEW
Admission Status; Secondary Review Determination   Under the authority of the Utilization Management Committee, the utilization review process indicated a secondary review on Maurice Biswas. The review outcome is based on review of the medical records, discussions with staff, and applying clinical experience noted on the date of the review.   (x) Inpatient Status Appropriate - This patient's medical care is consistent with medical management for inpatient care and reasonable inpatient medical practice.     RATIONALE FOR DETERMINATION   Maurice Biswas is a 68 yr old male with HTN, Hypothyroidism, MG and NILA.  He presented on 8/30/23 with recent MG crisis failing on prednisone and Mestinon.  Reports difficulties with deep inspiration, dysphagia and diplopia.  Neurology has seen and recommending plasma exchange.  Having catheter placed with IR and will start under nephrology management.  This will include exchange every other day x 3 then Qmonth.  Will remain in the hospital for the frequent plasma exchange to begin with while establishing a plan for outpatient.  At the time of admission with the information available to the attending physician more than 2 nights Hospital complex care was anticipated, based on patient risk of adverse outcome if treated as outpatient and complex care required. Inpatient admission is appropriate based on the Medicare guidelines.   The information on this document is developed by the utilization review team in order for the business office to ensure compliance. This only denotes the appropriateness of proper admission status and does not reflect the quality of care rendered.   The definitions of Inpatient Status and Observation Status used in making the determination above are those provided in the CMS Coverage Manual, Chapter 1 and Chapter 6, section 70.4.   Sincerely,   Lucía Carter MD  Utilization Review  Physician Advisor  [facility]

## 2023-08-31 NOTE — CONSULTS
Interventional Radiology - Pre-Procedure Note:  [facility]  08/31/2023     Procedure Requested: nontunneled HD catheter  Requested by: Grabiel Hampton MD    History and Physical Reviewed: H&P documented within 30 days (by Grabiel Hampton MD on 8/30/23).  I have personally reviewed the patient's medical history and have updated the medical record as necessary.     Past Surgical History:   Procedure Laterality Date    IR RENAL BIOPSY LEFT  8/20/2018        Brief HPI: Maurice Biswas is a 68 year old male with hypertension, hypothyroidism and myasthenia gravis, NILA. He was admitted 8/30 for weakness, fatigue and Myasthenia gravis crisis. Neurology consulted and recommending plasma exchnange. Nephrology has been consulted.     IMAGING:  Impression    Negative chest.  Narrative    For Patients: As a result of the 21st Century Cures Act, medical imaging exams and procedure reports are released immediately into your electronic medical record. You may view this report before your referring provider. If you have questions, please contact your health care provider.    EXAM: XR CHEST 2 VIEWS PA AND LATERAL  LOCATION: [facility]  DATE/TIME: 1/9/2023 8:26 AM    INDICATION: Cough, Unspecified Type  COMPARISON: None.    NPO: NA  ANTICOAGULANTS: ASA 81mg  ANTIBIOTICS: none    ALLERGIES  Allergies   Allergen Reactions    Doxycycline Muscle Pain (Myalgia) and Other (See Comments)     Brought on Myasthenia Gravis         LABS:  No results found for: INR   Hemoglobin   Date Value Ref Range Status   08/30/2023 15.2 13.3 - 17.7 g/dL Final     Platelet Count   Date Value Ref Range Status   08/30/2023 212 150 - 450 10e3/uL Final     Creatinine   Date Value Ref Range Status   08/31/2023 1.18 (H) 0.67 - 1.17 mg/dL Final     Potassium   Date Value Ref Range Status   08/31/2023 4.3 3.4 - 5.3 mmol/L Final   04/24/2018 4.5 3.5 - 5.0 mmol/L Final         EXAM:  BP (!) 175/79 (BP Location:  "Right arm)   Pulse 54   Temp 98.2  F (36.8  C) (Oral)   Resp 18   Ht 1.803 m (5' 11\")   Wt 86.1 kg (189 lb 14.4 oz)   SpO2 92%   BMI 26.49 kg/m    General:  Stable.  In no acute distress.    Neuro:  A&O x 3. Moves all extremities equally.  Resp:  Lungs clear to auscultation bilaterally.  Cardio:  S1S2 and reg, without murmur, clicks or rubs  Abdomen:  Soft, non-distended, non-tender, positive bowel sounds.  Skin:  Without excoriations, ecchymosis, erythema, lesions or open sores on neck.  MSK:  No gross motor weakness.  Sensation intact.  Proprioception intact.    Pre-Sedation Assessment:  No sedation planned  Code Status: Full Code intra procedure, per discussion with patient.         ASSESSMENT/PLAN:   Maurice Biswas is a 68 year old male with hypertension, hypothyroidism and myasthenia gravis, NILA. He was admitted 8/30 for weakness, fatigue and Myasthenia gravis crisis. Plan for plasmapheresis.     Plan for nontunneled CVC HD catheter    Procedural education reviewed with patient/family in detail including, but not limited to risks, benefits and alternatives with understanding verbalized by patient/family.    Total time spent on the date of the encounter: 45 minutes.      LEEROY CARVAJAL CNP  Interventional Radiology   "

## 2023-08-31 NOTE — PLAN OF CARE
Patient tolerating diet. Denies pain. CVC placed today, site CDI. Independent in room. , 1 unit insulin given per orders.     Hallie Kelley RN

## 2023-08-31 NOTE — H&P
ADMISSION HISTORY & PHYSICAL      Esa Valdivia, [phone]  ASSESSMENT AND PLAN:  68 year old male with hypertension, hypothyroidism and myasthenia gravis presenting with increasing fatigue and difficulty taking a deep breath:    Myasthenia gravis: He follows with [facility] clinic and had been started on prednisone 60 mg daily for couple days.  Also on Mestinon.  His symptoms continue to get gradually worse including increasing fatigue and difficulty taking deep breaths particularly with activity.  Has reported some mild difficulty swallowing but not currently and did pass a bedside swallow.  Discussed with neurology and they have seen him in the ED.  Recommending nephrology consult for plasma exchange.  Chronic kidney disease stage IIIa with IgA nephropathy: Usual baseline appears to be between 1.6 and 2.3.  Is 1.36 today.  Diagnosed on kidney biopsy August 2018.  Follows with Dr. Mcfarlane.  We will consult nephrology for plasma exchange.  Hyperglycemia: Likely from steroids.  No chart history of diabetes, blood glucose today 222.  We will start diabetic order set for closer monitoring.    Barriers to discharge: Exacerbation of myasthenia gravis, anticipate multiday stay      CHIEF COMPLAINT:  Fatigue, breathing difficulty    HISTORY OF PRESENTING ILLNESS:  Maurice Biswas is a 68 year old male who presents with the above concerns progressing over the past couple weeks, particularly over the past couple days.  No chest pain, denies any dyspnea at rest but feels it is difficult to take a deep breath but did have normal spirometry in the ED.  Does have some eyelid weakness also.  No fevers or chills, no chest pain, no nausea vomiting or diarrhea or abdominal pain.    PMH/PSH:  Patient Active Problem List   Diagnosis    Septic bursitis    NILA (acute kidney injury) (H)    Anemia, unspecified type    Acquired hypothyroidism    Sinus bradycardia    Abnormal LFTs    Myasthenia gravis (H)       ALLERGIES:  Allergies  "  Allergen Reactions    Doxycycline Muscle Pain (Myalgia) and Other (See Comments)     Brought on Myasthenia Gravis       MEDICATIONS:  Reviewed.  No current facility-administered medications for this encounter.     Current Outpatient Medications   Medication    amLODIPine (NORVASC) 5 MG tablet    aspirin 81 MG EC tablet    Calcium Carbonate-Vitamin D (OYSTER SHELL CALCIUM/D) 500-5 MG-MCG TABS    fish oil-omega-3 fatty acids 1000 MG capsule    fluticasone (FLONASE) 50 mcg/actuation nasal spray    levothyroxine (SYNTHROID/LEVOTHROID) 112 MCG tablet    losartan (COZAAR) 100 MG tablet    predniSONE (DELTASONE) 20 MG tablet    pyRIDostigmine (MESTINON) 60 MG tablet       SOCIAL HISTORY:  Social History     Socioeconomic History    Marital status:      Spouse name: Not on file    Number of children: Not on file    Years of education: Not on file    Highest education level: Not on file   Occupational History    Not on file   Tobacco Use    Smoking status: Never    Smokeless tobacco: Never   Substance and Sexual Activity    Alcohol use: No    Drug use: No    Sexual activity: Yes     Partners: Female   Other Topics Concern    Not on file   Social History Narrative    Not on file     Social Determinants of Health     Financial Resource Strain: Not on file   Food Insecurity: Not on file   Transportation Needs: Not on file   Physical Activity: Not on file   Stress: Not on file   Social Connections: Not on file   Intimate Partner Violence: Not on file   Housing Stability: Not on file       FAMILY HISTORY:  No family history on file.    ROS:  12 point ROS was performed and found to be negative except for the pertinent positives mentioned in the HPI.      PHYSICAL EXAM:  BP (!) 165/79   Pulse 65   Temp 97.7  F (36.5  C) (Oral)   Resp (!) 31   Ht 1.803 m (5' 11\")   Wt 86.1 kg (189 lb 14.4 oz)   SpO2 97%   BMI 26.49 kg/m    No intake/output data recorded.  No intake/output data recorded.  CONSTITUTIONAL: No apparent " distress  HEENT:       Sclera- anicteric      Mucous membrane- moist and pink  LUNGS: Clear to auscultation bilaterally  CARDIOVASCULAR: S1S2 regular. No murmurs, rubs or gallops,no pedal edema  GI: Soft. Non-tender, non-distended. No organomegaly. No guarding or rigidity. Bowel sounds- active  NEURO: Cranial nerves II-XII grossly intact.  Left eyelid ptosis, No involuntary movements  INTGM: No pallor, no cyanosis or clubbing  LYMPH:   MSK: no joint swelling or tenderness  PSYCH: alert and oriented, no agitation      DIAGNOSTIC DATA:  Recent Results (from the past 24 hour(s))   Comprehensive metabolic panel    Collection Time: 08/30/23  3:29 PM   Result Value Ref Range    Sodium 139 136 - 145 mmol/L    Potassium 4.6 3.4 - 5.3 mmol/L    Chloride 103 98 - 107 mmol/L    Carbon Dioxide (CO2) 24 22 - 29 mmol/L    Anion Gap 12 7 - 15 mmol/L    Urea Nitrogen 31.4 (H) 8.0 - 23.0 mg/dL    Creatinine 1.36 (H) 0.67 - 1.17 mg/dL    Calcium 9.5 8.8 - 10.2 mg/dL    Glucose 222 (H) 70 - 99 mg/dL    Alkaline Phosphatase 59 40 - 129 U/L    AST 28 0 - 45 U/L    ALT 49 0 - 70 U/L    Protein Total 6.9 6.4 - 8.3 g/dL    Albumin 4.5 3.5 - 5.2 g/dL    Bilirubin Total 1.1 <=1.2 mg/dL    GFR Estimate 56 (L) >60 mL/min/1.73m2   CBC with platelets and differential    Collection Time: 08/30/23  3:29 PM   Result Value Ref Range    WBC Count 8.9 4.0 - 11.0 10e3/uL    RBC Count 5.24 4.40 - 5.90 10e6/uL    Hemoglobin 15.2 13.3 - 17.7 g/dL    Hematocrit 47.7 40.0 - 53.0 %    MCV 91 78 - 100 fL    MCH 29.0 26.5 - 33.0 pg    MCHC 31.9 31.5 - 36.5 g/dL    RDW 12.8 10.0 - 15.0 %    Platelet Count 212 150 - 450 10e3/uL    % Neutrophils 93 %    % Lymphocytes 5 %    % Monocytes 2 %    % Eosinophils 0 %    % Basophils 0 %    % Immature Granulocytes 0 %    NRBCs per 100 WBC 0 <1 /100    Absolute Neutrophils 8.3 1.6 - 8.3 10e3/uL    Absolute Lymphocytes 0.4 (L) 0.8 - 5.3 10e3/uL    Absolute Monocytes 0.1 0.0 - 1.3 10e3/uL    Absolute Eosinophils 0.0 0.0 -  0.7 10e3/uL    Absolute Basophils 0.0 0.0 - 0.2 10e3/uL    Absolute Immature Granulocytes 0.0 <=0.4 10e3/uL    Absolute NRBCs 0.0 10e3/uL     All lab studies reviewed personally    No results found.  Radiology report reviewed.    Medical Decision Making       75 MINUTES SPENT BY ME on the date of service doing chart review, history, exam, documentation & further activities per the note.      Manolo Hampton MD  [facility] Medicine Service

## 2023-08-31 NOTE — PLAN OF CARE
Goal Outcome Evaluation:    Alert and oriented. Up independently in the room. Npo for ir non tunneled cath placement today. Then to have plasma exchange.  Wife in room waiting for pt to arrive back in the room.

## 2023-08-31 NOTE — PROGRESS NOTES
Assumed care for pt at [phone].   Pt is alert and oriented, VSS. Independent with activity. Tele is sinus natalie. Pt denies SOB and chest pain. Pt refused his heparin as he mobile and active. Will transfer to room 119, report given to P1 MORGAN Baltazar.

## 2023-08-31 NOTE — CONSULTS
NEPHROLOGY CONSULTATION - [facility]        REASON FOR CONSULTATION: myasthenia, needs plasmapheresis    HISTORY OF PRESENT ILLNESS: 67 yo male with CKD 3b, HTN, IgA nephropathy, myasthenia admit with worsening myasthenia symptoms. Reports fatigue over past month and new difficulty swallowing and orthopnea.  His prednisone was increased to 60 mg/day without improvement.  Has been eval by neurology here and rec pheresis every other day x 3 treatments.  He is chronically on losartan, last dose today.  Is going for nontunneled dialysis catheter today.    REVIEW OF SYSTEMS:Comprehensive review of systems obtained and otherwise negative.    No past medical history on file.    Social History     Socioeconomic History    Marital status:      Spouse name: Not on file    Number of children: Not on file    Years of education: Not on file    Highest education level: Not on file   Occupational History    Not on file   Tobacco Use    Smoking status: Never    Smokeless tobacco: Never   Substance and Sexual Activity    Alcohol use: No    Drug use: No    Sexual activity: Yes     Partners: Female   Other Topics Concern    Not on file   Social History Narrative    Not on file     Social Determinants of Health     Financial Resource Strain: Not on file   Food Insecurity: Not on file   Transportation Needs: Not on file   Physical Activity: Not on file   Stress: Not on file   Social Connections: Not on file   Intimate Partner Violence: Not on file   Housing Stability: Not on file       No family history on file.    Allergies   Allergen Reactions    Doxycycline Muscle Pain (Myalgia) and Other (See Comments)     Brought on Myasthenia Gravis       MEDICATIONS:   amLODIPine  5 mg Oral Daily    aspirin  81 mg Oral Daily    calcium carbonate-vitamin D  1 tablet Oral BID w/meals    heparin ANTICOAGULANT  5,000 Units Subcutaneous Q12H    insulin aspart  1-3 Units Subcutaneous TID AC    insulin aspart  1-3 Units Subcutaneous  "At Bedtime    levothyroxine  112 mcg Oral Daily    predniSONE  60 mg Oral Daily    pyRIDostigmine  60 mg Oral TID    sodium chloride (PF)  3 mL Intracatheter Q8H         PHYSICAL EXAM    BP (!) 175/79 (BP Location: Right arm)   Pulse 54   Temp 98.2  F (36.8  C) (Oral)   Resp 18   Ht 1.803 m (5' 11\")   Wt 86.1 kg (189 lb 14.4 oz)   SpO2 92%   BMI 26.49 kg/m          Gen: NAD  HEENT: No icterus, NC/AT  CARDIOVASCULAR: RR, no rub,  trace edema  PULMONARY: CTA ant No cyanosis  GASTROINTESTINAL: soft, NT/ND  MSK: Diffuse muscle atrophy, warm  NEURO: Alert, no gross focal findings  PSYCHIATRIC: Normal affect, answers questions appropriately  SKIN: Pale, no jaundice, no rash.    LABORATORIES    Recent Labs   Lab 08/30/23  1529   WBC 8.9   HGB 15.2   HCT 47.7        Recent Labs   Lab 08/31/23  0710 08/30/23  1529    139   CO2 28 24   BUN 28.8* 31.4*   ALKPHOS  --  59   ALT  --  49   AST  --  28         ASSESSMENT:   67 yo male with CKD 3b, HTN, IgA nephropathy, myasthenia admit with worsening myasthenia symptoms.       PLAN:  Myasthenia - prior ocular myasthenia now with recent fatigue, sob, swallowing difficulty despite increase prednisone with recommendation for plasmaphresis every other day for 3 treatments per neurololg  -consent of pheresis on chart  -will plan to begin pheresis tomorrow as had losartan this morning, at risk for anaphytlactoid reaction (more common with ACE inhibitors but case reports with ARBs) with albumin replacement  2. CKD 3b- due to biopsy proven IgA nephropathy, baseline creat 1.2-1.9  -will continue to monitor, currently quite stable  3. HTN - not controlled, high dose prednisone may be contributing to worsened HTN  -holding losartan with plan for pheresis  -resume losartan when pheresis complete  -increase amlodipine to 10 mg/day    I discussed plan for pheresis with neurology    Carlee Mcfarlane MD  [facility]  [phone]       "

## 2023-08-31 NOTE — PLAN OF CARE
Problem: Plan of Care - These are the overarching goals to be used throughout the patient stay.    Goal: Plan of Care Review  Description: The Plan of Care Review/Shift note should be completed every shift.  The Outcome Evaluation is a brief statement about your assessment that the patient is improving, declining, or no change.  This information will be displayed automatically on your shift note.  Outcome: Progressing   Goal Outcome Evaluation:    Patient alert and oriented x4 and denies pain. Denies SOB and chest pain overnight, up independent in room. Hypertensive when arrived to unit, since has trended down. Will continue to monitor.

## 2023-09-01 LAB
GLUCOSE BLDC GLUCOMTR-MCNC: 104 MG/DL (ref 70–99)
GLUCOSE BLDC GLUCOMTR-MCNC: 95 MG/DL (ref 70–99)

## 2023-09-01 PROCEDURE — 258N000003 HC RX IP 258 OP 636: Performed by: INTERNAL MEDICINE

## 2023-09-01 PROCEDURE — 250N000013 HC RX MED GY IP 250 OP 250 PS 637: Performed by: INTERNAL MEDICINE

## 2023-09-01 PROCEDURE — 120N000001 HC R&B MED SURG/OB

## 2023-09-01 PROCEDURE — 250N000012 HC RX MED GY IP 250 OP 636 PS 637: Performed by: EMERGENCY MEDICINE

## 2023-09-01 PROCEDURE — P9045 ALBUMIN (HUMAN), 5%, 250 ML: HCPCS | Mod: JZ | Performed by: INTERNAL MEDICINE

## 2023-09-01 PROCEDURE — 250N000011 HC RX IP 250 OP 636: Mod: JZ | Performed by: INTERNAL MEDICINE

## 2023-09-01 PROCEDURE — 999N000157 HC STATISTIC RCP TIME EA 10 MIN

## 2023-09-01 PROCEDURE — 250N000013 HC RX MED GY IP 250 OP 250 PS 637: Performed by: EMERGENCY MEDICINE

## 2023-09-01 PROCEDURE — 99232 SBSQ HOSP IP/OBS MODERATE 35: CPT | Performed by: INTERNAL MEDICINE

## 2023-09-01 PROCEDURE — 250N000011 HC RX IP 250 OP 636: Mod: JZ | Performed by: EMERGENCY MEDICINE

## 2023-09-01 PROCEDURE — 250N000009 HC RX 250: Performed by: INTERNAL MEDICINE

## 2023-09-01 PROCEDURE — 94150 VITAL CAPACITY TEST: CPT

## 2023-09-01 PROCEDURE — 99232 SBSQ HOSP IP/OBS MODERATE 35: CPT | Performed by: PSYCHIATRY & NEUROLOGY

## 2023-09-01 RX ORDER — DIPHENHYDRAMINE HYDROCHLORIDE 50 MG/ML
50 INJECTION INTRAMUSCULAR; INTRAVENOUS
Status: DISCONTINUED | OUTPATIENT
Start: 2023-09-01 | End: 2023-09-05 | Stop reason: HOSPADM

## 2023-09-01 RX ORDER — ALBUMIN HUMAN 25 %
500 INTRAVENOUS SOLUTION INTRAVENOUS ONCE
Status: DISCONTINUED | OUTPATIENT
Start: 2023-09-01 | End: 2023-09-01

## 2023-09-01 RX ORDER — CHLORTHALIDONE 25 MG/1
25 TABLET ORAL DAILY
Status: DISCONTINUED | OUTPATIENT
Start: 2023-09-01 | End: 2023-09-05 | Stop reason: HOSPADM

## 2023-09-01 RX ORDER — ATROPINE SULFATE 0.4 MG/ML
0.5 AMPUL (ML) INJECTION
Status: DISCONTINUED | OUTPATIENT
Start: 2023-09-01 | End: 2023-09-01

## 2023-09-01 RX ORDER — ANTICOAGULANT CITRATE DEXTROSE SOLUTION FORMULA A 12.25; 11; 3.65 G/500ML; G/500ML; G/500ML
500-1500 SOLUTION INTRAVENOUS ONCE
Status: DISCONTINUED | OUTPATIENT
Start: 2023-09-01 | End: 2023-09-01

## 2023-09-01 RX ORDER — ANTICOAGULANT CITRATE DEXTROSE SOLUTION FORMULA A 12.25; 11; 3.65 G/500ML; G/500ML; G/500ML
1000 SOLUTION INTRAVENOUS ONCE
Status: COMPLETED | OUTPATIENT
Start: 2023-09-01 | End: 2023-09-01

## 2023-09-01 RX ORDER — CALCIUM GLUCONATE 20 MG/ML
1 INJECTION, SOLUTION INTRAVENOUS ONCE
Status: DISCONTINUED | OUTPATIENT
Start: 2023-09-01 | End: 2023-09-01

## 2023-09-01 RX ORDER — ACETAMINOPHEN 325 MG/1
325 TABLET ORAL ONCE
Status: COMPLETED | OUTPATIENT
Start: 2023-09-01 | End: 2023-09-01

## 2023-09-01 RX ORDER — ATROPINE SULFATE 0.4 MG/ML
0.5 AMPUL (ML) INJECTION
Status: CANCELLED | OUTPATIENT
Start: 2023-09-01

## 2023-09-01 RX ORDER — ANTICOAGULANT CITRATE DEXTROSE SOLUTION FORMULA A 12.25; 11; 3.65 G/500ML; G/500ML; G/500ML
500-1500 SOLUTION INTRAVENOUS ONCE
Status: CANCELLED | OUTPATIENT
Start: 2023-09-01 | End: 2023-09-01

## 2023-09-01 RX ORDER — DIPHENHYDRAMINE HYDROCHLORIDE 50 MG/ML
50 INJECTION INTRAMUSCULAR; INTRAVENOUS ONCE
Status: COMPLETED | OUTPATIENT
Start: 2023-09-01 | End: 2023-09-01

## 2023-09-01 RX ORDER — ATROPINE SULFATE 0.4 MG/ML
0.5 AMPUL (ML) INJECTION
Status: COMPLETED | OUTPATIENT
Start: 2023-09-01 | End: 2023-09-01

## 2023-09-01 RX ORDER — HEPARIN SODIUM 1000 [USP'U]/ML
2000-5000 INJECTION, SOLUTION INTRAVENOUS; SUBCUTANEOUS ONCE
Status: COMPLETED | OUTPATIENT
Start: 2023-09-01 | End: 2023-09-01

## 2023-09-01 RX ADMIN — HEPARIN SODIUM 2500 UNITS: 1000 INJECTION INTRAVENOUS; SUBCUTANEOUS at 12:43

## 2023-09-01 RX ADMIN — CALCIUM GLUCONATE 4 G: 98 INJECTION, SOLUTION INTRAVENOUS at 10:40

## 2023-09-01 RX ADMIN — PYRIDOSTIGMINE BROMIDE 60 MG: 60 TABLET ORAL at 14:21

## 2023-09-01 RX ADMIN — ANTICOAGULANT CITRATE DEXTROSE SOLUTION FORMULA A 1000 ML: 12.25; 11; 3.65 SOLUTION INTRAVENOUS at 11:43

## 2023-09-01 RX ADMIN — LEVOTHYROXINE SODIUM 112 MCG: 0.11 TABLET ORAL at 14:24

## 2023-09-01 RX ADMIN — Medication 81 MG: at 14:24

## 2023-09-01 RX ADMIN — PYRIDOSTIGMINE BROMIDE 60 MG: 60 TABLET ORAL at 20:21

## 2023-09-01 RX ADMIN — CHLORTHALIDONE 25 MG: 25 TABLET ORAL at 14:45

## 2023-09-01 RX ADMIN — ALBUMIN HUMAN 3750 ML: 0.05 INJECTION, SOLUTION INTRAVENOUS at 10:40

## 2023-09-01 RX ADMIN — HEPARIN SODIUM 5000 UNITS: 10000 INJECTION, SOLUTION INTRAVENOUS; SUBCUTANEOUS at 20:21

## 2023-09-01 RX ADMIN — PREDNISONE 60 MG: 20 TABLET ORAL at 14:20

## 2023-09-01 RX ADMIN — AMLODIPINE BESYLATE 10 MG: 5 TABLET ORAL at 14:22

## 2023-09-01 RX ADMIN — SODIUM CHLORIDE 1000 ML: 9 INJECTION, SOLUTION INTRAVENOUS at 11:00

## 2023-09-01 RX ADMIN — ANTICOAGULANT CITRATE DEXTROSE SOLUTION FORMULA A 1000 ML: 12.25; 11; 3.65 SOLUTION INTRAVENOUS at 10:40

## 2023-09-01 RX ADMIN — Medication 1 TABLET: at 19:09

## 2023-09-01 RX ADMIN — HEPARIN SODIUM 5000 UNITS: 10000 INJECTION, SOLUTION INTRAVENOUS; SUBCUTANEOUS at 14:27

## 2023-09-01 ASSESSMENT — ACTIVITIES OF DAILY LIVING (ADL)
ADLS_ACUITY_SCORE: 18

## 2023-09-01 NOTE — PROGRESS NOTES
RESPIRATORY CARE NOTE     Patient Name: Maurice Biswas  Today's Date: 9/1/2023       Pt remains on RA with adequate saturations and denies any shortness of breath at this time. BS:Clear to auscultations and decreased @ bases. NIF and VC done this am and pt was able to achieve VC 2.96L  and NIF -35.. RT will continue to follow.      Jasmin Lanier RRT

## 2023-09-01 NOTE — PROGRESS NOTES
[facility]    Medicine Progress Note - Hospitalist Service    Date of Admission:  8/30/2023    Assessment & Plan          Maurice Biswas is a 68 year old male with history of hypertension, hypothyroidism and myasthenia gravis presented with increasing fatigue and difficulty taking a deep breath. Admitted for Myasthenia gravis exacerbation.     Myasthenia gravis exacerbation:  He follows with [facility] and had been started on prednisone 60 mg daily for couple days.  Also on Mestinon.  His symptoms continued to get gradually worse including increasing fatigue and difficulty taking deep breaths particularly with activity.  Has reported some mild difficulty swallowing but not currently and did pass a bedside swallow.    - Neurology recommended plasmapheresis. Nephrology consulted for plasma exchange. Non-tunneled catheter placed 8/31. Start first session today. Planning plasmapheresis every other day for 3 sessions, then once a month for 3 months thereafter.    - Continue home mestinon and prednisone  - Considering addition of Cellcept  - NIF monitoring    Chronic kidney disease stage IIIa with IgA nephropathy: Diagnosed with kidney biopsy August 2018.  Follows with Dr. Mcfarlane.   - Baseline appears to be between 1.6 and 2.3.  Is 1.18 today.   Nephrology following for plasma exchange as above.    Steroid induced hyperglycemia: No history of diabetes, A1c 5.4 on 8/30/23.  - Blood sugar is mildly high. Novolog sliding scale.    Hypothyroidism: home Synthroid    Essential hypertension: Losartan held per nephrology, some concern for anaphylactoid reaction risk with albumin needed for plasmapheresis. Home amlodipine increased.       Diet: Regular Diet Adult    DVT Prophylaxis: Heparin SQ  [place] Catheter: Not present  Lines: PRESENT      CVC Double Lumen Right Internal jugular Non - tunneled-Site Assessment: WDL      Cardiac Monitoring: None  Code Status: Full Code      Clinically Significant Risk  "Factors                  # Hypertension: Noted on problem list        # Overweight: Estimated body mass index is 26.49 kg/m  as calculated from the following:    Height as of this encounter: 1.803 m (5' 11\").    Weight as of this encounter: 86.1 kg (189 lb 14.4 oz)., PRESENT ON ADMISSION            Disposition Plan     Expected Discharge Date: 09/05/2023        Discharge Comments: plasma exchange every other day x3 treatments and NIF monitoring. possible for outpatient plasma exchange but probably need to stay here a couple days at least to ensure symptoms turn around          Ben Rodriguez MD  Hospitalist Service  [facility]  Securely message with [employer] (more info)  Text page via [employer] Paging/Directory   ______________________________________________________________________    Interval History   Patient was seen during plasma paresis. He reports feeling OK. No SOB.     Physical Exam   Vital Signs: Temp: 98  F (36.7  C) Temp src: Oral BP: (!) 160/83 Pulse: 50   Resp: 20 SpO2: 95 % O2 Device: None (Room air)    Weight: 189 lbs 14.4 oz    General appearance: not in acute distress  HEENT: PERRL, EOMI  Lungs: Clear breath sounds in bilateral lung fields  Cardiovascular: Regular rate and rhythm, normal S1-S2  Abdomen: Soft, non tender, no distension  Musculoskeletal: No joint swelling  Skin: No rash and no edema  Neurology: AAO ×3.  Cranial nerves II - XII normal.  Normal muscle strength in all four extremities.     Medical Decision Making       45 MINUTES SPENT BY ME on the date of service doing chart review, history, exam, documentation & further activities per the note.      Data         Imaging results reviewed over the past 24 hrs:   No results found for this or any previous visit (from the past 24 hour(s)).  "

## 2023-09-01 NOTE — PROGRESS NOTES
NEUROLOGY PROGRESS NOTE     ASSESSMENT & PLAN     Diagnosis: Myasthenia gravis exacerbation     69 yo man admitted for myasthenia gravis exacerbation. Fast titration of prednisone as OP     PLEX ordered to start today (Confirmed correct order set with Pharmacy). Plasma exchange every other day for 3 doses, then monthly for 3 months  Hold PTA Losartan  Continue PTA Mestinon and prednisone for now  Follow SUMANTH/VC - ok so far  Consider CellCept initiation prior to discharge  Physical therapy/occupational therapy   Glucose management per Hospitalist (elevated 2/2 steroids)  Neurology will follow along        Neurology Discharge Planning:  TBD    Patient Active Problem List    Diagnosis Date Noted     Stage 3a chronic kidney disease (H) 08/31/2023     Priority: Medium     Primary hypertension 08/31/2023     Priority: Medium     Steroid-induced hyperglycemia 08/31/2023     Priority: Medium     Myasthenia gravis (H) 08/30/2023     Priority: Medium     Sinus bradycardia 04/22/2018     Priority: Medium     Abnormal LFTs 04/22/2018     Priority: Medium     Septic bursitis 04/21/2018     Priority: Medium     NILA (acute kidney injury) (H)      Priority: Medium     Anemia, unspecified type      Priority: Medium     Acquired hypothyroidism      Priority: Medium     Medical History  No past medical history on file.     SUBJECTIVE     No acute events overnight.    Maurice says he feels about the same.  Still does not feel that his breathing is normal.  He did do a little bit better laying flat yesterday.  Swallow also feels a little off.  His wife is at bedside and says that his eyelid droop is much worse than usual.  He mentions to me that he had 2 surgeries to lift the eyelid before he was diagnosed with MG.  No additional concerns, no muscle weakness.     OBJECTIVE     Vital signs in last 24 hours  Temp:  [97.3  F (36.3  C)-98.6  F (37  C)] 98  F (36.7  C)  Pulse:  [50-55] 50  Resp:  [16-20] 20  BP: (142-166)/(78-83) 160/83  SpO2:   [95 %-96 %] 95 %    Weight:   [unfilled]    Review of Systems   Pertinent items are noted in HPI.    General Physical Exam: Patient is alert and oriented x 3. Vital signs were reviewed and are documented in EMR. Clear lung sounds.  Neurological Exam:  Mental status: Attentive, interactive  Speech: Fluent  Cranial nerves: Ptosis left eyelid, otherwise 2-12 intact  Motor: Good strength in all the limbs.  No fatigability with prolonged arm raise  Sensory: Intact to light touch throughout  Coordination: Normal fine motor movements, no dysmetria  Gait: Normal casual gait     DIAGNOSTIC STUDIES     Pertinent Radiology   Radiology Results: No new imaging to review    Pertinent Labs   Lab Results: personally reviewed.   Recent Results (from the past 24 hour(s))   Glucose by meter    Collection Time: 08/31/23  8:49 AM   Result Value Ref Range    GLUCOSE BY METER POCT 96 70 - 99 mg/dL   Glucose by meter    Collection Time: 08/31/23 11:26 AM   Result Value Ref Range    GLUCOSE BY METER POCT 93 70 - 99 mg/dL   Glucose by meter    Collection Time: 08/31/23  4:49 PM   Result Value Ref Range    GLUCOSE BY METER POCT 146 (H) 70 - 99 mg/dL   Glucose by meter    Collection Time: 08/31/23 10:02 PM   Result Value Ref Range    GLUCOSE BY METER POCT 143 (H) 70 - 99 mg/dL         HOSPITAL MEDICATIONS       amLODIPine  10 mg Oral Daily     aspirin  81 mg Oral Daily     calcium carbonate-vitamin D  1 tablet Oral BID w/meals     heparin ANTICOAGULANT  5,000 Units Subcutaneous Q12H     insulin aspart  1-3 Units Subcutaneous TID AC     levothyroxine  112 mcg Oral Daily     predniSONE  60 mg Oral Daily     pyRIDostigmine  60 mg Oral TID     sodium chloride (PF)  3 mL Intracatheter Q8H        Total time spent for face to face visit, reviewing labs/imaging studies, counseling and coordination of care was: 45 Minutes. More than 50% of this time was spent on counseling and coordination of care.    Dragon software used in the dictation on this  note.    Damaris Shrestha MD  [facility] - [address], [address]  [place] [address]

## 2023-09-01 NOTE — PLAN OF CARE
Problem: Plan of Care - These are the overarching goals to be used throughout the patient stay.    Goal: Optimal Comfort and Wellbeing  Outcome: Progressing     Problem: Plan of Care - These are the overarching goals to be used throughout the patient stay.    Goal: Absence of Hospital-Acquired Illness or Injury  Intervention: Identify and Manage Fall Risk  Recent Flowsheet Documentation  Taken 9/1/2023 0200 by Maurice Barone, RN  Safety Promotion/Fall Prevention: activity supervised  Taken 8/31/2023 2130 by Maurice Barone, RN  Safety Promotion/Fall Prevention: activity supervised   Goal Outcome Evaluation:       Patient is alert and oriented. Denies any pain or discomfort. No complain of SOB or difficulty of breathing. Independent with transfers. On regular diet.

## 2023-09-01 NOTE — PROGRESS NOTES
"RENAL PROGRESS NOTE - [facility]        CC: follow up myasthenia    Subjective: feels about the same as yesterday with some difficulty swallowing. Denies sob. Ongoing ptosis, awaiting pheresis #1 today    ASSESSMENT:   69 yo male with CKD 3b, HTN, IgA nephropathy, myasthenia admit with worsening myasthenia symptoms.         PLAN:  Myasthenia - prior ocular myasthenia now with recent fatigue, sob, swallowing difficulty despite increase prednisone with recommendation for plasmaphresis every other day for 3 treatments per neurology  -consent for pheresis on chart  -will plan to begin pheresis today as had been on losartan until yesterday, at risk for anaphytactoid reaction (more common with ACE inhibitors but case reports with ARBs) with albumin replacement  -next pheresis will be Sunday  -neurology notes plan for monthly pheresis, will need iv access for this (currently with temp catheter which he cannot discharge with)   Will need neurology to weigh risks of permcath placement for this (would need weekly flush and will be at risk for bacteremia) vs benefit of monthly pheresis for treatment of myasthenia    2. CKD 3b- due to biopsy proven IgA nephropathy, baseline creat 1.2-1.9  -will continue to monitor, currently quite stable    3. HTN - not controlled, high dose prednisone may be contributing to worsened HTN  -holding losartan with plan for pheresis  -resume losartan when pheresis complete  -increased amlodipine to 10 mg/day  -will add chlorthalidone 25 mg/day    Carlee Mcfarlane MD  [facility]  [phone]    Objective    PHYSICAL EXAM  BP (!) 156/80   Pulse 58   Temp 97.8  F (36.6  C) (Oral)   Resp 20   Ht 1.803 m (5' 10.98\")   Wt 86 kg (189 lb 9.5 oz)   SpO2 96%   BMI 26.46 kg/m    I/O last 3 completed shifts:  In: 1130 [P.O.:1130]  Out: -   Wt Readings from Last 3 Encounters:   09/01/23 86 kg (189 lb 9.5 oz)       GENERAL: nad  HEENT:normocephalic, atraumatic, " ptosis  CARDIOVASCULAR: rr, no rub,  no edema  PULMONARY:cta ant. No cyanosis  GASTROINTESTINAL: soft, nt/nd  MSK: diffuse muscle atrophy, warm  NEURO: Alert, no gross focal findings  PSYCHIATRIC: Adequate mood and interaction  SKIN: Pale, no jaundice, no rash.    LABORATORIES  Recent Labs   Lab 08/31/23  0710 08/30/23  1529    139   POTASSIUM 4.3 4.6   CHLORIDE 105 103   CO2 28 24   BUN 28.8* 31.4*   CR 1.18* 1.36*   GFRESTIMATED 67 56*   SARAH 9.0 9.5   ALBUMIN  --  4.5       Recent Labs   Lab 08/30/23  1529   WBC 8.9   HGB 15.2   HCT 47.7   MCV 91             MEDICATIONS   sodium chloride 0.9%  1,000-1,250 mL Intravenous Once    sodium chloride 0.9%  1,000-1,250 mL Intravenous Once    acetaminophen  325 mg Oral Once    Or    acetaminophen  325 mg Rectal Once    albumin human  4,250 mL Intravenous Once    amLODIPine  10 mg Oral Daily    anticoagulant citrate dextrose-A  1,000 mL Apheresis Once    aspirin  81 mg Oral Daily    calcium carbonate-vitamin D  1 tablet Oral BID w/meals    calcium gluconate  4 g Intravenous Once    diphenhydrAMINE  50 mg Intravenous Once    heparin ANTICOAGULANT  5,000 Units Subcutaneous Q12H    heparin Lock (1000 units/mL High concentration)  2,000-5,000 Units Intravenous Once    hydrocortisone sodium succinate PF  100 mg Intravenous Once    insulin aspart  1-3 Units Subcutaneous TID AC    levothyroxine  112 mcg Oral Daily    predniSONE  60 mg Oral Daily    pyRIDostigmine  60 mg Oral TID    sodium chloride (PF)  10 mL Intravenous Once    sodium chloride (PF)  3 mL Intracatheter Q8H         Carlee Mcfarlane MD  [facility]  [phone]

## 2023-09-01 NOTE — PLAN OF CARE
Problem: Plan of Care - These are the overarching goals to be used throughout the patient stay.    Goal: Readiness for Transition of Care  Outcome: Not Progressing   Goal Outcome Evaluation:  Tolerated plasma phoresis well.      BP elevated early in the shift, came down during plasma phoresis.  Scheduled meds given after that.      States swallowing difficulty.  And eyes swollen.  Feels that swallowing hasn't improved from admission and eyes may be a little worse than admission.  MD notified.

## 2023-09-02 LAB
ANION GAP SERPL CALCULATED.3IONS-SCNC: 9 MMOL/L (ref 7–15)
BUN SERPL-MCNC: 26.5 MG/DL (ref 8–23)
CALCIUM SERPL-MCNC: 9.5 MG/DL (ref 8.8–10.2)
CHLORIDE SERPL-SCNC: 99 MMOL/L (ref 98–107)
CREAT SERPL-MCNC: 1.3 MG/DL (ref 0.67–1.17)
DEPRECATED HCO3 PLAS-SCNC: 29 MMOL/L (ref 22–29)
GFR SERPL CREATININE-BSD FRML MDRD: 59 ML/MIN/1.73M2
GLUCOSE BLDC GLUCOMTR-MCNC: 115 MG/DL (ref 70–99)
GLUCOSE BLDC GLUCOMTR-MCNC: 117 MG/DL (ref 70–99)
GLUCOSE BLDC GLUCOMTR-MCNC: 141 MG/DL (ref 70–99)
GLUCOSE BLDC GLUCOMTR-MCNC: 167 MG/DL (ref 70–99)
GLUCOSE BLDC GLUCOMTR-MCNC: 87 MG/DL (ref 70–99)
GLUCOSE SERPL-MCNC: 96 MG/DL (ref 70–99)
PLATELET # BLD AUTO: 189 10E3/UL (ref 150–450)
POTASSIUM SERPL-SCNC: 4.5 MMOL/L (ref 3.4–5.3)
SODIUM SERPL-SCNC: 137 MMOL/L (ref 136–145)

## 2023-09-02 PROCEDURE — 99233 SBSQ HOSP IP/OBS HIGH 50: CPT | Performed by: PSYCHIATRY & NEUROLOGY

## 2023-09-02 PROCEDURE — 250N000013 HC RX MED GY IP 250 OP 250 PS 637: Performed by: EMERGENCY MEDICINE

## 2023-09-02 PROCEDURE — 80048 BASIC METABOLIC PNL TOTAL CA: CPT | Performed by: INTERNAL MEDICINE

## 2023-09-02 PROCEDURE — 85049 AUTOMATED PLATELET COUNT: CPT | Performed by: EMERGENCY MEDICINE

## 2023-09-02 PROCEDURE — 120N000001 HC R&B MED SURG/OB

## 2023-09-02 PROCEDURE — 250N000011 HC RX IP 250 OP 636: Mod: JZ | Performed by: EMERGENCY MEDICINE

## 2023-09-02 PROCEDURE — 250N000013 HC RX MED GY IP 250 OP 250 PS 637: Performed by: INTERNAL MEDICINE

## 2023-09-02 PROCEDURE — 36415 COLL VENOUS BLD VENIPUNCTURE: CPT | Performed by: INTERNAL MEDICINE

## 2023-09-02 PROCEDURE — 94150 VITAL CAPACITY TEST: CPT

## 2023-09-02 PROCEDURE — 250N000013 HC RX MED GY IP 250 OP 250 PS 637: Performed by: HOSPITALIST

## 2023-09-02 PROCEDURE — 99232 SBSQ HOSP IP/OBS MODERATE 35: CPT | Performed by: INTERNAL MEDICINE

## 2023-09-02 PROCEDURE — 250N000012 HC RX MED GY IP 250 OP 636 PS 637: Performed by: EMERGENCY MEDICINE

## 2023-09-02 RX ADMIN — AMLODIPINE BESYLATE 10 MG: 5 TABLET ORAL at 09:02

## 2023-09-02 RX ADMIN — Medication 1 TABLET: at 09:02

## 2023-09-02 RX ADMIN — FLUTICASONE PROPIONATE 1 SPRAY: 50 SPRAY, METERED NASAL at 09:17

## 2023-09-02 RX ADMIN — PYRIDOSTIGMINE BROMIDE 60 MG: 60 TABLET ORAL at 09:01

## 2023-09-02 RX ADMIN — LEVOTHYROXINE SODIUM 112 MCG: 0.11 TABLET ORAL at 09:17

## 2023-09-02 RX ADMIN — Medication 81 MG: at 09:02

## 2023-09-02 RX ADMIN — HEPARIN SODIUM 5000 UNITS: 10000 INJECTION, SOLUTION INTRAVENOUS; SUBCUTANEOUS at 20:57

## 2023-09-02 RX ADMIN — HEPARIN SODIUM 5000 UNITS: 10000 INJECTION, SOLUTION INTRAVENOUS; SUBCUTANEOUS at 09:02

## 2023-09-02 RX ADMIN — PYRIDOSTIGMINE BROMIDE 60 MG: 60 TABLET ORAL at 13:44

## 2023-09-02 RX ADMIN — PYRIDOSTIGMINE BROMIDE 60 MG: 60 TABLET ORAL at 20:57

## 2023-09-02 RX ADMIN — CHLORTHALIDONE 25 MG: 25 TABLET ORAL at 09:01

## 2023-09-02 RX ADMIN — Medication 1 TABLET: at 17:16

## 2023-09-02 RX ADMIN — PREDNISONE 60 MG: 20 TABLET ORAL at 09:02

## 2023-09-02 ASSESSMENT — ACTIVITIES OF DAILY LIVING (ADL)
ADLS_ACUITY_SCORE: 18

## 2023-09-02 NOTE — PLAN OF CARE
Problem: Plan of Care - These are the overarching goals to be used throughout the patient stay.    Goal: Absence of Hospital-Acquired Illness or Injury  Intervention: Identify and Manage Fall Risk  Recent Flowsheet Documentation  Taken 9/1/2023 1600 by Zaira Macdonald RN  Safety Promotion/Fall Prevention:   room door open   nonskid shoes/slippers when out of bed     Pt alert and oriented x4. Did not have difficulties with opening eyes. Pt reports has improved. Reports of slight weakness with swallowing. Took pills whole with water fine. Blood glucose 107 & 243. Independent in room, ambulated out in hallway.

## 2023-09-02 NOTE — PROGRESS NOTES
"NEUROLOGY PROGRESS NOTE     ASSESSMENT & PLAN   Visit diagnosis: Myasthenia gravis exacerbation    Myasthenia gravis exacerbation  Hyperglycemia secondary to steroids    Suspected myasthenia gravis exacerbation with history of ocular myasthenia gravis  Chest x-ray negative.  CBC on admission showed elevated white blood cell count at 12.4 this could be related to steroids  Continue home dose of Mestinon and prednisone  3 days of plasmapheresis.  So far he is done 1 day with significant improvement  Consideration of CellCept as an outpatient-instead of steroids  Continue NIF/vital capacity/respiratory therapy  Supportive care  Sliding scale insulin for hyperglycemia related to steroids  Prior to admission losartan is being held.  Consider other medications for blood pressure management    Neurology Discharge Planning : After 3 sessions of plasmapheresis  Will need outpatient follow-up with Dr. Alamo.    Patient Active Problem List    Diagnosis Date Noted     Stage 3a chronic kidney disease (H) 08/31/2023     Priority: Medium     Primary hypertension 08/31/2023     Priority: Medium     Steroid-induced hyperglycemia 08/31/2023     Priority: Medium     Myasthenia gravis (H) 08/30/2023     Priority: Medium     Sinus bradycardia 04/22/2018     Priority: Medium     Abnormal LFTs 04/22/2018     Priority: Medium     Septic bursitis 04/21/2018     Priority: Medium     NILA (acute kidney injury) (H)      Priority: Medium     Anemia, unspecified type      Priority: Medium     Acquired hypothyroidism      Priority: Medium     Medical History  No past medical history on file.     SUBJECTIVE     Patient seen in follow for Dr. Shrestha  \"69 yo man admitted for myasthenia gravis exacerbation. Fast titration of prednisone as OP\"    Patient tells me that he has a history of myasthenia gravis.  Has been on Mestinon prednisone for a long time recently has had worsening.  Was admitted for Myasthenia Gravis Exacerbation.  Has had 1 session " "of plasmapheresis with 50% improvement.  Does have some diarrhea with the Mestinon.  Is interested in going on other medications in the future.  Did have some eyelid droop after the plasmapheresis yesterday because his medications were stopped.  No other new concerns.  Vital capacities have been appropriate per respiratory therapy notes.     OBJECTIVE     Vital signs in last 24 hours  Temp:  [97.8  F (36.6  C)-98  F (36.7  C)] 98  F (36.7  C)  Pulse:  [45-68] 54  Resp:  [18-19] 18  BP: (116-167)/(58-86) 137/83  SpO2:  [93 %-96 %] 96 %    Review of Systems   12 System ROS was done other than the HPI this was negative.    PHYSICAL EXAMINATION  VITALS: /83 (BP Location: Right arm)   Pulse 54   Temp 98  F (36.7  C) (Oral)   Resp 18   Ht 1.803 m (5' 10.98\")   Wt 86 kg (189 lb 9.5 oz)   SpO2 96%   BMI 26.46 kg/m    GENERAL -Health appearing, No apparent distress  EYES- No scleral icterus, no eyelid droop, Pupils - see Neuro section  HEENT - Normocephalic, atraumatic, Hearing grossly intact; Oral mucosa moist and pink in color. External Ears and nose intact.   Neck - supple  PULM - Good spontaneous respiratory effort;  CV-extremities warm and well-perfused  MSK- Gait - see Neuro section; Strength and tone- see Neuro section; Range of motion grossly intact.  PSYCH -cooperative    Neurological  Mental status - Patient is awake and grossly oriented to self, place and time. Attention span is normal. Language is fluent and follows commands appropriately.   Cranial nerves - CN II-XII intact. Pupils are reactive and symmetric; EOMI, VFIT, NLF symmetric.  No eyelid droop noted.  Motor - There is no pronator drift. Motor exam shows 5/5 strength in all extremities.  Tone - Tone is symmetric bilaterally in upper and lower extremities.  Reflexes - Reflexes are symmetric   Sensation - Sensory exam is grossly intact to light touch, pain.  Coordination - Finger to nose is without dysmetria.  Gait and station --able to " ambulate with assistance.  Formal gait testing cannot be done due to safety concerns from ongoing medical issues.     DIAGNOSTIC STUDIES     Pertinent Radiology   CXR  INDICATION: Cough, Unspecified Type   COMPARISON: None.     Ferritin 120  TSH 2.3  Recent CMP noncontributory   A1c 5.4    Recent Results (from the past 24 hour(s))   Glucose by meter    Collection Time: 09/01/23 11:42 AM   Result Value Ref Range    GLUCOSE BY METER POCT 104 (H) 70 - 99 mg/dL   Glucose by meter    Collection Time: 09/02/23  2:10 AM   Result Value Ref Range    GLUCOSE BY METER POCT 115 (H) 70 - 99 mg/dL   Platelet count    Collection Time: 09/02/23  7:15 AM   Result Value Ref Range    Platelet Count 189 150 - 450 10e3/uL   Basic metabolic panel    Collection Time: 09/02/23  7:15 AM   Result Value Ref Range    Sodium 137 136 - 145 mmol/L    Potassium 4.5 3.4 - 5.3 mmol/L    Chloride 99 98 - 107 mmol/L    Carbon Dioxide (CO2) 29 22 - 29 mmol/L    Anion Gap 9 7 - 15 mmol/L    Urea Nitrogen 26.5 (H) 8.0 - 23.0 mg/dL    Creatinine 1.30 (H) 0.67 - 1.17 mg/dL    Calcium 9.5 8.8 - 10.2 mg/dL    Glucose 96 70 - 99 mg/dL    GFR Estimate 59 (L) >60 mL/min/1.73m2   Glucose by meter    Collection Time: 09/02/23  8:37 AM   Result Value Ref Range    GLUCOSE BY METER POCT 87 70 - 99 mg/dL         HOSPITAL MEDICATIONS       amLODIPine  10 mg Oral Daily     aspirin  81 mg Oral Daily     calcium carbonate-vitamin D  1 tablet Oral BID w/meals     chlorthalidone  25 mg Oral Daily     heparin ANTICOAGULANT  5,000 Units Subcutaneous Q12H     insulin aspart  1-3 Units Subcutaneous TID AC     levothyroxine  112 mcg Oral Daily     predniSONE  60 mg Oral Daily     pyRIDostigmine  60 mg Oral TID     sodium chloride (PF)  3 mL Intracatheter Q8H        Total time spent for face to face visit, reviewing labs/imaging studies, counseling and coordination of care was: Over 50 min More than 50% of this time was spent on counseling and coordination of care.    Patient  new to me.  Reviewing chart.  Reviewing testing.  Reviewing previous notes.  Counseling patient.  Patient had a lot of questions.    Dru Blanc MD  Neurologist  [facility]  Tel:- [phone]

## 2023-09-02 NOTE — PROCEDURES
Treatment in room CS-OF using Optia apheresis machine. Consent verified.     Height: 5 ft 11in  Weight: 180kg  HCT: 47%  Inlet speed:   ACDA ratio: 10:1  Fluid balance: 100  Access: Right CVC     Replacement product: Albumin 1.5 exchange  Electrolyte replacement: Ca Gluconate 4grams in NS - total 100mls, piggybacked into return lines, infused over time of treatment.    Premedications: none     Treatment Notes: no complications.  Patient stated he thought he felt better after 1 tx     Treatment completed as ordered, VSS, see EPIC flowsheet for run data.     Next treatment: 9-3-23    Tawanna Encarnacion RN

## 2023-09-02 NOTE — PROGRESS NOTES
[facility]    Medicine Progress Note - Hospitalist Service    Date of Admission:  8/30/2023    Assessment & Plan          Maurice Biswas is a 68 year old male with history of hypertension, hypothyroidism and myasthenia gravis presented with increasing fatigue and difficulty taking a deep breath. Admitted for Myasthenia gravis exacerbation.     Myasthenia gravis exacerbation:  He follows with [facility] and had been started on prednisone 60 mg daily for couple days.  Also on Mestinon.  His symptoms continued to get gradually worse including increasing fatigue and difficulty taking deep breaths particularly with activity.  Has reported some mild difficulty swallowing but not currently and did pass a bedside swallow.    - Neurology recommended plasmapheresis. Nephrology consulted for plasma exchange. Non-tunneled catheter placed 8/31. Plan plasmapheresis every other day for 3 sessions, then once a month for 3 months thereafter.  Started first session 9/1.   - Continue home mestinon and prednisone  - Considering addition of Cellcept  - NIF monitoring    Chronic kidney disease stage IIIa with IgA nephropathy: Diagnosed with kidney biopsy August 2018.  Follows with Dr. Mcfarlane.   - Baseline appears to be between 1.6 and 2.3. Currently at baseline. Nephrology following for plasma exchange as above.    Steroid induced hyperglycemia: No history of diabetes, A1c 5.4 on 8/30/23.  - Blood sugar is mildly high. Novolog sliding scale.    Hypothyroidism: home Synthroid    Essential hypertension: Losartan held per nephrology, some concern for anaphylactoid reaction risk with albumin needed for plasmapheresis. Home amlodipine increased.       Diet: Regular Diet Adult    DVT Prophylaxis: Heparin SQ  [place] Catheter: Not present  Lines: PRESENT      CVC Double Lumen Right Internal jugular Non - tunneled-Site Assessment: WDL      Cardiac Monitoring: None  Code Status: Full Code      Clinically Significant  "Risk Factors                  # Hypertension: Noted on problem list        # Overweight: Estimated body mass index is 26.46 kg/m  as calculated from the following:    Height as of this encounter: 1.803 m (5' 10.98\").    Weight as of this encounter: 86 kg (189 lb 9.5 oz)., PRESENT ON ADMISSION            Disposition Plan      Expected Discharge Date: 09/05/2023        Discharge Comments: plasma exchange every other day x3 treatments and NIF monitoring. possible for outpatient plasma exchange but probably need to stay here a couple days at least to ensure symptoms turn around          Ben Rodriguez MD  Hospitalist Service  [facility]  Securely message with [email or web address] (more info)  Text page via [employer] Paging/Directory   ______________________________________________________________________    Interval History   Patient felt that he had difficulty open his eyes right after plasma paresis yesterday. But his symptom quickly resolved afterwards. Today, he feels that his SOB gets 50% better. He feels easier to take a deep breath.     Physical Exam   Vital Signs: Temp: 98.3  F (36.8  C) Temp src: Oral BP: (!) 149/69 Pulse: 53   Resp: 18 SpO2: 96 % O2 Device: None (Room air)    Weight: 189 lbs 9.53 oz    General appearance: not in acute distress  HEENT: PERRL, EOMI  Lungs: Clear breath sounds in bilateral lung fields  Cardiovascular: Regular rate and rhythm, normal S1-S2  Abdomen: Soft, non tender, no distension  Musculoskeletal: No joint swelling  Skin: No rash and no edema  Neurology: AAO ×3.  Cranial nerves II - XII normal.  Normal muscle strength in all four extremities.     Medical Decision Making       40 MINUTES SPENT BY ME on the date of service doing chart review, history, exam, documentation & further activities per the note.      Data     I have personally reviewed the following data over the past 24 hrs:    N/A  \   N/A   / 189     137 99 26.5 (H) /  87   4.5 29 1.30 (H) \       Imaging results " reviewed over the past 24 hrs:   No results found for this or any previous visit (from the past 24 hour(s)).

## 2023-09-02 NOTE — PROGRESS NOTES
"     RENAL/ APHERESIS PROGRESS NOTE     CC: follow up myasthenia    Subjective: Since last seen, patient feels \"50% stronger\" after TPE #1 yesterday. Improved breathing, swallowing and overall strength. No rash, dizziness, nausea. Discussed care with Apheresis RN (no note entered for procedure yesterday  but completed - late note entry requested). Plan next TPE tomorrow. Discussed possible issues with monthly TPE and insurance/ access considerations.         ASSESSMENT:   67 yo male with CKD 3b, HTN, IgA nephropathy, myasthenia admit with worsening myasthenia symptoms.         PLAN:  Myasthenia - prior ocular myasthenia now with recent fatigue, sob, swallowing difficulty despite increase prednisone with recommendation for plasmaphresis every other day for 3 treatments per neurology  -consent for pheresis on chart  - S/P 1.5 volume exchange 9/1/23 tolerated well  - 1.5 volume exchange planned 9/3 and 9/5  - Will need insurance approval if monthly outpt TPE planned and access placement for these unless AC veins can be used. Seems risk of infection with PCAD for once monthly use may be too high...    2. CKD 3b- due to biopsy proven IgA nephropathy, baseline creat 1.2-1.9  -will continue to monitor, currently quite stable    3. HTN - Improved. Holding Losartan with other med adjustments since admit. Would favor resuming Losartan on discharge in lieu of diuretic.     -increased amlodipine to 10 mg/day  -Added chlorthalidone 25 mg/day temporarily.     Hector Abraham MD  [facility]  [phone] (off)         Objective    PHYSICAL EXAM  /83 (BP Location: Right arm)   Pulse 54   Temp 98  F (36.7  C) (Oral)   Resp 18   Ht 1.803 m (5' 10.98\")   Wt 86 kg (189 lb 9.5 oz)   SpO2 96%   BMI 26.46 kg/m    I/O last 3 completed shifts:  In: 360 [P.O.:360]  Out: -   Wt Readings from Last 3 Encounters:   09/01/23 86 kg (189 lb 9.5 oz)       GENERAL: nad, talkative, alert  HEENT:normocephalic, " atraumatic, ptosis  CARDIOVASCULAR: RRR, no rub,  no edema  PULMONARY: CTA ant. No cyanosis or resp distress  GASTROINTESTINAL: soft, nt/nd  MSK: diffuse muscle atrophy, warm  NEURO: Alert, 5/5  and lid close bilat  PSYCHIATRIC: Appropriate mood and interaction  SKIN: RIJ CVC site clean. No rash.    LABORATORIES  Recent Labs   Lab 09/02/23  0715 08/31/23  0710 08/30/23  1529    140 139   POTASSIUM 4.5 4.3 4.6   CHLORIDE 99 105 103   CO2 29 28 24   BUN 26.5* 28.8* 31.4*   CR 1.30* 1.18* 1.36*   GFRESTIMATED 59* 67 56*   SARAH 9.5 9.0 9.5   ALBUMIN  --   --  4.5         Recent Labs   Lab 09/02/23  0715 08/30/23  1529   WBC  --  8.9   HGB  --  15.2   HCT  --  47.7   MCV  --  91    212            MEDICATIONS   amLODIPine  10 mg Oral Daily    aspirin  81 mg Oral Daily    calcium carbonate-vitamin D  1 tablet Oral BID w/meals    chlorthalidone  25 mg Oral Daily    heparin ANTICOAGULANT  5,000 Units Subcutaneous Q12H    insulin aspart  1-3 Units Subcutaneous TID AC    levothyroxine  112 mcg Oral Daily    predniSONE  60 mg Oral Daily    pyRIDostigmine  60 mg Oral TID    sodium chloride (PF)  3 mL Intracatheter Q8H

## 2023-09-02 NOTE — PLAN OF CARE
Problem: Plan of Care - These are the overarching goals to be used throughout the patient stay.    Goal: Plan of Care Review  Description: The Plan of Care Review/Shift note should be completed every shift.  The Outcome Evaluation is a brief statement about your assessment that the patient is improving, declining, or no change.  This information will be displayed automatically on your shift note.  Outcome: Progressing  Flowsheets (Taken 9/2/2023 7834)  Plan of Care Reviewed With: patient   Goal Outcome Evaluation:      Plan of Care Reviewed With: patient  Pt is alert and oriented x 4, independently ambulating in room. Neuro intact with left preorbital edema. Vitals are stable on room air. Followed for Nephrology and Neurology. Pt will have two more dose of Plasma Pheresis for treatment of Myasthenic gravis on 9/3 and 9/5.

## 2023-09-03 LAB
GLUCOSE BLDC GLUCOMTR-MCNC: 107 MG/DL (ref 70–99)
GLUCOSE BLDC GLUCOMTR-MCNC: 119 MG/DL (ref 70–99)
GLUCOSE BLDC GLUCOMTR-MCNC: 156 MG/DL (ref 70–99)
GLUCOSE BLDC GLUCOMTR-MCNC: 180 MG/DL (ref 70–99)
GLUCOSE BLDC GLUCOMTR-MCNC: 243 MG/DL (ref 70–99)
GLUCOSE BLDC GLUCOMTR-MCNC: 76 MG/DL (ref 70–99)

## 2023-09-03 PROCEDURE — 94150 VITAL CAPACITY TEST: CPT

## 2023-09-03 PROCEDURE — 250N000012 HC RX MED GY IP 250 OP 636 PS 637: Performed by: EMERGENCY MEDICINE

## 2023-09-03 PROCEDURE — 36514 APHERESIS PLASMA: CPT

## 2023-09-03 PROCEDURE — 250N000013 HC RX MED GY IP 250 OP 250 PS 637: Performed by: EMERGENCY MEDICINE

## 2023-09-03 PROCEDURE — 99232 SBSQ HOSP IP/OBS MODERATE 35: CPT | Performed by: INTERNAL MEDICINE

## 2023-09-03 PROCEDURE — 250N000011 HC RX IP 250 OP 636: Mod: JZ | Performed by: EMERGENCY MEDICINE

## 2023-09-03 PROCEDURE — 999N000157 HC STATISTIC RCP TIME EA 10 MIN

## 2023-09-03 PROCEDURE — 250N000009 HC RX 250: Performed by: INTERNAL MEDICINE

## 2023-09-03 PROCEDURE — 250N000013 HC RX MED GY IP 250 OP 250 PS 637: Performed by: INTERNAL MEDICINE

## 2023-09-03 PROCEDURE — 250N000011 HC RX IP 250 OP 636: Mod: JZ | Performed by: INTERNAL MEDICINE

## 2023-09-03 PROCEDURE — 99232 SBSQ HOSP IP/OBS MODERATE 35: CPT | Performed by: PSYCHIATRY & NEUROLOGY

## 2023-09-03 PROCEDURE — P9045 ALBUMIN (HUMAN), 5%, 250 ML: HCPCS | Mod: JZ | Performed by: INTERNAL MEDICINE

## 2023-09-03 PROCEDURE — 120N000001 HC R&B MED SURG/OB

## 2023-09-03 PROCEDURE — 258N000003 HC RX IP 258 OP 636: Performed by: INTERNAL MEDICINE

## 2023-09-03 RX ORDER — ANTICOAGULANT CITRATE DEXTROSE SOLUTION FORMULA A 12.25; 11; 3.65 G/500ML; G/500ML; G/500ML
500-1500 SOLUTION INTRAVENOUS ONCE
Status: COMPLETED | OUTPATIENT
Start: 2023-09-03 | End: 2023-09-03

## 2023-09-03 RX ORDER — ANTICOAGULANT CITRATE DEXTROSE SOLUTION FORMULA A 12.25; 11; 3.65 G/500ML; G/500ML; G/500ML
1500 SOLUTION INTRAVENOUS ONCE
Status: DISCONTINUED | OUTPATIENT
Start: 2023-09-03 | End: 2023-09-03

## 2023-09-03 RX ORDER — ALBUMIN HUMAN 25 %
4000 INTRAVENOUS SOLUTION INTRAVENOUS
Status: COMPLETED | OUTPATIENT
Start: 2023-09-03 | End: 2023-09-03

## 2023-09-03 RX ORDER — ATROPINE SULFATE 0.4 MG/ML
0.5 AMPUL (ML) INJECTION
Status: DISCONTINUED | OUTPATIENT
Start: 2023-09-03 | End: 2023-09-03

## 2023-09-03 RX ORDER — HEPARIN SODIUM 1000 [USP'U]/ML
2000-5000 INJECTION, SOLUTION INTRAVENOUS; SUBCUTANEOUS ONCE
Status: COMPLETED | OUTPATIENT
Start: 2023-09-03 | End: 2023-09-03

## 2023-09-03 RX ORDER — ATROPINE SULFATE 0.4 MG/ML
0.5 AMPUL (ML) INJECTION
Status: DISCONTINUED | OUTPATIENT
Start: 2023-09-03 | End: 2023-09-05 | Stop reason: HOSPADM

## 2023-09-03 RX ADMIN — CHLORTHALIDONE 25 MG: 25 TABLET ORAL at 10:00

## 2023-09-03 RX ADMIN — HEPARIN SODIUM 5000 UNITS: 10000 INJECTION, SOLUTION INTRAVENOUS; SUBCUTANEOUS at 21:45

## 2023-09-03 RX ADMIN — HEPARIN SODIUM 5000 UNITS: 10000 INJECTION, SOLUTION INTRAVENOUS; SUBCUTANEOUS at 10:03

## 2023-09-03 RX ADMIN — PYRIDOSTIGMINE BROMIDE 60 MG: 60 TABLET ORAL at 10:01

## 2023-09-03 RX ADMIN — PYRIDOSTIGMINE BROMIDE 60 MG: 60 TABLET ORAL at 14:36

## 2023-09-03 RX ADMIN — Medication 1 TABLET: at 09:59

## 2023-09-03 RX ADMIN — HEPARIN SODIUM 2500 UNITS: 1000 INJECTION INTRAVENOUS; SUBCUTANEOUS at 14:16

## 2023-09-03 RX ADMIN — PYRIDOSTIGMINE BROMIDE 60 MG: 60 TABLET ORAL at 21:44

## 2023-09-03 RX ADMIN — AMLODIPINE BESYLATE 10 MG: 5 TABLET ORAL at 09:58

## 2023-09-03 RX ADMIN — LEVOTHYROXINE SODIUM 112 MCG: 0.11 TABLET ORAL at 10:11

## 2023-09-03 RX ADMIN — ANTICOAGULANT CITRATE DEXTROSE SOLUTION FORMULA A 1000 ML: 12.25; 11; 3.65 SOLUTION INTRAVENOUS at 12:14

## 2023-09-03 RX ADMIN — SODIUM CHLORIDE 1000 ML: 9 INJECTION, SOLUTION INTRAVENOUS at 12:14

## 2023-09-03 RX ADMIN — PREDNISONE 60 MG: 20 TABLET ORAL at 10:00

## 2023-09-03 RX ADMIN — CALCIUM GLUCONATE 4 G: 98 INJECTION, SOLUTION INTRAVENOUS at 12:13

## 2023-09-03 RX ADMIN — Medication 1 TABLET: at 17:54

## 2023-09-03 RX ADMIN — Medication 81 MG: at 09:59

## 2023-09-03 RX ADMIN — ALBUMIN HUMAN 4000 ML: 0.05 INJECTION, SOLUTION INTRAVENOUS at 14:15

## 2023-09-03 ASSESSMENT — ACTIVITIES OF DAILY LIVING (ADL)
ADLS_ACUITY_SCORE: 23
ADLS_ACUITY_SCORE: 18

## 2023-09-03 NOTE — PROGRESS NOTES
"     RENAL/ APHERESIS PROGRESS NOTE     CC: follow up myasthenia    Subjective: Since last seen, patient continues to feel stronger. Has left eye ptosis this AM after doing a neb but was not an issue previously.  Feels stronger. No dysphagia, dyspnea, dizziness.    ASSESSMENT:   69 yo male with CKD 3b, HTN, IgA nephropathy, myasthenia admit with worsening myasthenia symptoms.         PLAN:  Myasthenia - prior ocular myasthenia now with recent fatigue, sob, swallowing difficulty despite increase prednisone with recommendation for plasmaphresis every other day for 3 treatments per neurology  -consent for pheresis on chart  - S/P 1.5 volume exchange 9/1/23 tolerated well  - 1.5 volume exchange planned 9/3 and 9/5  - Will need insurance approval if monthly outpt TPE planned and access placement for these unless AC veins can be used. Seems risk of infection with PCAD for once monthly use may be too high...    2. CKD 3b- due to biopsy proven IgA nephropathy, baseline creat 1.2-1.9  -will continue to monitor, currently quite stable  - Follows with Dr. Mcfarlane    3. HTN - Improved. Holding Losartan with other med adjustments since admit. Would favor resuming Losartan on discharge in lieu of diuretic.     -increased amlodipine to 10 mg/day  -Added chlorthalidone 25 mg/day temporarily.     Hector Abraham MD  [facility]  [phone] (off)         Objective    PHYSICAL EXAM  BP (!) 168/95 (BP Location: Right arm, Patient Position: Sitting)   Pulse 53   Temp 98  F (36.7  C) (Oral)   Resp 18   Ht 1.803 m (5' 10.98\")   Wt 86 kg (189 lb 9.5 oz)   SpO2 97%   BMI 26.46 kg/m    I/O last 3 completed shifts:  In: 720 [P.O.:720]  Out: -   Wt Readings from Last 3 Encounters:   09/03/23 86 kg (189 lb 9.5 oz)       GENERAL: nad, talkative, alert  HEENT:normocephalic, atraumatic, ptosis  CARDIOVASCULAR: RRR, no rub,  no edema  PULMONARY: CTA ant. No cyanosis or resp distress  GASTROINTESTINAL: soft, " nt/nd  MSK: diffuse muscle atrophy, warm  NEURO: Alert, 5/5  and lid close bilat  PSYCHIATRIC: Appropriate mood and interaction  SKIN: RIJ CVC site clean. No rash.    LABORATORIES  Recent Labs   Lab 09/02/23  0715 08/31/23  0710 08/30/23  1529    140 139   POTASSIUM 4.5 4.3 4.6   CHLORIDE 99 105 103   CO2 29 28 24   BUN 26.5* 28.8* 31.4*   CR 1.30* 1.18* 1.36*   GFRESTIMATED 59* 67 56*   SARAH 9.5 9.0 9.5   ALBUMIN  --   --  4.5         Recent Labs   Lab 09/02/23  0715 08/30/23  1529   WBC  --  8.9   HGB  --  15.2   HCT  --  47.7   MCV  --  91    212            MEDICATIONS   sodium chloride 0.9%  1,000 mL Intravenous Once    sodium chloride 0.9%  1,000-1,250 mL Intravenous Once    albumin human  4,000 mL Apheresis During apheresis procedure    amLODIPine  10 mg Oral Daily    anticoagulant citrate dextrose-A  1,500 mL Apheresis Once    anticoagulant citrate dextrose-A  500-1,500 mL Apheresis Once    aspirin  81 mg Oral Daily    calcium carbonate-vitamin D  1 tablet Oral BID w/meals    calcium gluconate  4 g Intravenous Once    chlorthalidone  25 mg Oral Daily    heparin ANTICOAGULANT  5,000 Units Subcutaneous Q12H    heparin Lock (1000 units/mL High concentration)  2,000-5,000 Units Intravenous Once    insulin aspart  1-3 Units Subcutaneous TID AC    levothyroxine  112 mcg Oral Daily    predniSONE  60 mg Oral Daily    pyRIDostigmine  60 mg Oral TID    sodium chloride (PF)  10 mL Intravenous Once    sodium chloride (PF)  10 mL Intravenous Once    sodium chloride (PF)  3 mL Intracatheter Q8H

## 2023-09-03 NOTE — PROGRESS NOTES
[facility]    Medicine Progress Note - Hospitalist Service    Date of Admission:  8/30/2023    Assessment & Plan          Maurice Biswas is a 68 year old male with history of hypertension, hypothyroidism and myasthenia gravis presented with increasing fatigue and difficulty taking a deep breath. Admitted for Myasthenia gravis exacerbation.     Myasthenia gravis exacerbation:  He follows with [facility] and had been started on prednisone 60 mg daily for couple days.  Also on Mestinon.  His symptoms continued to get gradually worse including increasing fatigue and difficulty taking deep breaths particularly with activity.  Has reported some mild difficulty swallowing but not currently and did pass a bedside swallow.    - Neurology recommended plasmapheresis. Nephrology consulted for plasma exchange. Non-tunneled catheter placed 8/31. Plan plasmapheresis every other day for 3 sessions, then once a month for 3 months thereafter.  Started first session 9/1. Plan to complete the second session today.   - Continue home mestinon and prednisone  - Considering addition of Cellcept  - NIF monitoring    Chronic kidney disease stage IIIa with IgA nephropathy: Diagnosed with kidney biopsy August 2018.  Follows with Dr. Mcfarlane.   - Baseline appears to be between 1.6 and 2.3. Currently at baseline. Nephrology following for plasma exchange as above.    Steroid induced hyperglycemia: No history of diabetes, A1c 5.4 on 8/30/23.  - Blood sugar is mildly high. Novolog sliding scale.    Hypothyroidism: home Synthroid    Essential hypertension: Losartan held per nephrology, some concern for anaphylactoid reaction risk with albumin needed for plasmapheresis. Home amlodipine increased.       Diet: Regular Diet Adult    DVT Prophylaxis: Heparin SQ  Holbrook Catheter: Not present  Lines: PRESENT      CVC Double Lumen Right Internal jugular Non - tunneled-Site Assessment: WDL      Cardiac Monitoring: None  Code  "Status: Full Code      Clinically Significant Risk Factors                  # Hypertension: Noted on problem list        # Overweight: Estimated body mass index is 26.46 kg/m  as calculated from the following:    Height as of this encounter: 1.803 m (5' 10.98\").    Weight as of this encounter: 86 kg (189 lb 9.5 oz)., PRESENT ON ADMISSION            Disposition Plan     Expected Discharge Date: 09/05/2023        Discharge Comments: plasma exchange every other day x3 treatments and NIF monitoring. possible for outpatient plasma exchange but probably need to stay here a couple days at least to ensure symptoms turn around          Ben Rodriguez MD  Hospitalist Service  [facility]  Securely message with [employer]Lawyer (more info)  Text page via [employer] Paging/Directory   ______________________________________________________________________    Interval History   No events. Patient reports feeling well today. No SOB.     Physical Exam   Vital Signs: Temp: 97.7  F (36.5  C) Temp src: Oral BP: 133/77 Pulse: 65   Resp: 18 SpO2: 97 % O2 Device: None (Room air)    Weight: 189 lbs 9.53 oz    General appearance: not in acute distress  HEENT: PERRL, EOMI  Lungs: Clear breath sounds in bilateral lung fields  Cardiovascular: Regular rate and rhythm, normal S1-S2  Abdomen: Soft, non tender, no distension  Musculoskeletal: No joint swelling  Skin: No rash and no edema  Neurology: AAO ×3.  Cranial nerves II - XII normal.  Normal muscle strength in all four extremities.     Medical Decision Making       40 MINUTES SPENT BY ME on the date of service doing chart review, history, exam, documentation & further activities per the note.      Data         Imaging results reviewed over the past 24 hrs:   No results found for this or any previous visit (from the past 24 hour(s)).  "

## 2023-09-03 NOTE — PLAN OF CARE
Problem: Myasthenia Gravis  Goal: Optimal Functional Ability  Outcome: Progressing  Goal: Symptom Stability  Outcome: Progressing  Intervention: Prevent or Manage Myasthenia Gravis Symptoms  Recent Flowsheet Documentation  Taken 9/2/2023 2310 by Dar Abreu RN  Medication Review/Management: medications reviewed  Goal: Effective Oxygenation and Ventilation  Outcome: Progressing  Intervention: Optimize Oxygenation and Ventilation  Recent Flowsheet Documentation  Taken 9/2/2023 2310 by Dar Abreu RN  Head of Bed (HOB) Positioning: HOB flat  Goal: Oral Intake without Aspiration  Outcome: Progressing   Goal Outcome Evaluation:    Patient denies dyspnea or shortness of breath. Denies pain. Lung sounds clear, O2 sats 93% room air. Stated feels stronger. Has no swallowing problems. Independent in the room.

## 2023-09-03 NOTE — PROGRESS NOTES
Treatment in room 119 plasmapheresis for Myasthenia Gravis using Optia apheresis machine.   Consent verified.     Height: 5 ft 10in  Weight: 86kg  HCT: 47%  Inlet speed: 80-95  ACDA ratio: 10:1  Fluid balance: 100  Access: Right temporary CVC     Replacement product: 5% Albumin 3884mls  Electrolyte replacement: Ca Gluconate 4 grams in NS - total 140mls, piggybacked into return lines, infused over time of treatment.    Premedications: None     Treatment Notes: Pt tolerated treatment well     Treatment completed as ordered, VSS, see EPIC flowsheet for run data.     Next treatment: 9/5/23    Veirto Bañuelos RN

## 2023-09-03 NOTE — PROVIDER NOTIFICATION
NIF/VC maneuvers were performed this AM.  Results below:     09/03/23 0940   Negative Inspiratory Force (NIF)   Negative Inspiratory Force (cm H2O) -60   Effort (NIF) good   Patient Position (NIF) sitting on edge of bed   Negative Inspiratory Force (DOCT ONLY) Yes   Vital Capacity (VC)   Vital Capacity (mL) 3230   Patient Position (VC) sitting on edge of bed   Effort (VC) dunia [place], RT  9/3/2023

## 2023-09-04 LAB
ANION GAP SERPL CALCULATED.3IONS-SCNC: 13 MMOL/L (ref 7–15)
BUN SERPL-MCNC: 28.7 MG/DL (ref 8–23)
CALCIUM SERPL-MCNC: 9.3 MG/DL (ref 8.8–10.2)
CHLORIDE SERPL-SCNC: 98 MMOL/L (ref 98–107)
CREAT SERPL-MCNC: 1.4 MG/DL (ref 0.67–1.17)
DEPRECATED HCO3 PLAS-SCNC: 25 MMOL/L (ref 22–29)
ERYTHROCYTE [DISTWIDTH] IN BLOOD BY AUTOMATED COUNT: 12.7 % (ref 10–15)
GFR SERPL CREATININE-BSD FRML MDRD: 54 ML/MIN/1.73M2
GLUCOSE BLDC GLUCOMTR-MCNC: 141 MG/DL (ref 70–99)
GLUCOSE BLDC GLUCOMTR-MCNC: 153 MG/DL (ref 70–99)
GLUCOSE BLDC GLUCOMTR-MCNC: 86 MG/DL (ref 70–99)
GLUCOSE BLDC GLUCOMTR-MCNC: 98 MG/DL (ref 70–99)
GLUCOSE SERPL-MCNC: 87 MG/DL (ref 70–99)
HCT VFR BLD AUTO: 49 % (ref 40–53)
HGB BLD-MCNC: 16.3 G/DL (ref 13.3–17.7)
MCH RBC QN AUTO: 29.2 PG (ref 26.5–33)
MCHC RBC AUTO-ENTMCNC: 33.3 G/DL (ref 31.5–36.5)
MCV RBC AUTO: 88 FL (ref 78–100)
PLATELET # BLD AUTO: 173 10E3/UL (ref 150–450)
POTASSIUM SERPL-SCNC: 4 MMOL/L (ref 3.4–5.3)
RBC # BLD AUTO: 5.59 10E6/UL (ref 4.4–5.9)
SODIUM SERPL-SCNC: 136 MMOL/L (ref 136–145)
WBC # BLD AUTO: 20.8 10E3/UL (ref 4–11)

## 2023-09-04 PROCEDURE — 250N000012 HC RX MED GY IP 250 OP 636 PS 637: Performed by: EMERGENCY MEDICINE

## 2023-09-04 PROCEDURE — 250N000011 HC RX IP 250 OP 636: Mod: JZ | Performed by: EMERGENCY MEDICINE

## 2023-09-04 PROCEDURE — 120N000001 HC R&B MED SURG/OB

## 2023-09-04 PROCEDURE — 94150 VITAL CAPACITY TEST: CPT

## 2023-09-04 PROCEDURE — 36415 COLL VENOUS BLD VENIPUNCTURE: CPT | Performed by: INTERNAL MEDICINE

## 2023-09-04 PROCEDURE — 85027 COMPLETE CBC AUTOMATED: CPT | Performed by: INTERNAL MEDICINE

## 2023-09-04 PROCEDURE — 999N000157 HC STATISTIC RCP TIME EA 10 MIN

## 2023-09-04 PROCEDURE — 250N000013 HC RX MED GY IP 250 OP 250 PS 637: Performed by: INTERNAL MEDICINE

## 2023-09-04 PROCEDURE — 99232 SBSQ HOSP IP/OBS MODERATE 35: CPT | Performed by: INTERNAL MEDICINE

## 2023-09-04 PROCEDURE — 80048 BASIC METABOLIC PNL TOTAL CA: CPT | Performed by: INTERNAL MEDICINE

## 2023-09-04 PROCEDURE — 250N000013 HC RX MED GY IP 250 OP 250 PS 637: Performed by: EMERGENCY MEDICINE

## 2023-09-04 PROCEDURE — 99232 SBSQ HOSP IP/OBS MODERATE 35: CPT | Performed by: PSYCHIATRY & NEUROLOGY

## 2023-09-04 RX ADMIN — Medication 1 TABLET: at 17:14

## 2023-09-04 RX ADMIN — HEPARIN SODIUM 5000 UNITS: 10000 INJECTION, SOLUTION INTRAVENOUS; SUBCUTANEOUS at 09:30

## 2023-09-04 RX ADMIN — HEPARIN SODIUM 5000 UNITS: 10000 INJECTION, SOLUTION INTRAVENOUS; SUBCUTANEOUS at 20:57

## 2023-09-04 RX ADMIN — Medication 1 TABLET: at 09:30

## 2023-09-04 RX ADMIN — CHLORTHALIDONE 25 MG: 25 TABLET ORAL at 09:30

## 2023-09-04 RX ADMIN — Medication 81 MG: at 09:30

## 2023-09-04 RX ADMIN — PREDNISONE 60 MG: 20 TABLET ORAL at 09:29

## 2023-09-04 RX ADMIN — PYRIDOSTIGMINE BROMIDE 60 MG: 60 TABLET ORAL at 13:54

## 2023-09-04 RX ADMIN — LEVOTHYROXINE SODIUM 112 MCG: 0.11 TABLET ORAL at 09:30

## 2023-09-04 RX ADMIN — PYRIDOSTIGMINE BROMIDE 60 MG: 60 TABLET ORAL at 09:29

## 2023-09-04 RX ADMIN — AMLODIPINE BESYLATE 10 MG: 5 TABLET ORAL at 09:29

## 2023-09-04 RX ADMIN — PYRIDOSTIGMINE BROMIDE 60 MG: 60 TABLET ORAL at 21:52

## 2023-09-04 ASSESSMENT — ACTIVITIES OF DAILY LIVING (ADL)
ADLS_ACUITY_SCORE: 18

## 2023-09-04 NOTE — PROGRESS NOTES
"     RENAL/ APHERESIS PROGRESS NOTE     CC: follow up myasthenia    Subjective: Since last seen, patient continues to feel stronger. Walking in room today. Denies double vision, dyspnea, dizziness. BP controlled. Plan final TPE tomorrow pending ongoing Neuro eval. Patient seen/examined and discussed with Dr. Bradford at bedside.     Has left eye ptosis this AM after doing a neb but was not an issue previously.  Feels stronger. No dysphagia, dyspnea, dizziness.    ASSESSMENT:   69 yo male with CKD 3b, HTN, IgA nephropathy, myasthenia admit with worsening myasthenia symptoms.         PLAN:  Myasthenia - prior ocular myasthenia now with recent fatigue, sob, swallowing difficulty despite increase prednisone with recommendation for plasmaphresis every other day for 3 treatments per neurology  -consent for pheresis on chart  - S/P 1.5 volume exchange 9/1/23 tolerated well  - 1.5 volume exchange planned 9/3 and 9/5  - Will need insurance approval if monthly outpt TPE planned and access placement for these unless AC veins can be used. Seems risk of infection with PCAD for once monthly use may be too high...    2. CKD 3b- due to biopsy proven IgA nephropathy, baseline creat 1.2-1.9  -will continue to monitor, currently quite stable  - Follows with Dr. Mcfarlane    3. HTN - Improved. Holding Losartan with other med adjustments since admit. Would favor resuming Losartan on discharge in lieu of diuretic.     -increased amlodipine to 10 mg/day  -Added chlorthalidone 25 mg/day temporarily.     Hector Abraham MD  [facility]  [phone] (off)         Objective    PHYSICAL EXAM  /70 (BP Location: Right arm)   Pulse 60   Temp 97.7  F (36.5  C) (Oral)   Resp 18   Ht 1.803 m (5' 10.98\")   Wt 86 kg (189 lb 9.5 oz)   SpO2 98%   BMI 26.46 kg/m    I/O last 3 completed shifts:  In: 600 [P.O.:600]  Out: -   Wt Readings from Last 3 Encounters:   09/03/23 86 kg (189 lb 9.5 oz)       GENERAL: nad, " talkative, alert  HEENT:normocephalic, atraumatic, left ptosis  CARDIOVASCULAR: RRR, no rub,  no edema  PULMONARY: CTA ant. No cyanosis or resp distress  GASTROINTESTINAL: soft, nt/nd  MSK: diffuse muscle atrophy, warm  NEURO: Alert, 5/5    PSYCHIATRIC: Appropriate mood and interaction  SKIN: RIJ CVC site clean. No rash.    LABORATORIES  Recent Labs   Lab 09/04/23  0752 09/02/23  0715 08/31/23  0710 08/30/23  1529    137 140 139   POTASSIUM 4.0 4.5 4.3 4.6   CHLORIDE 98 99 105 103   CO2 25 29 28 24   BUN 28.7* 26.5* 28.8* 31.4*   CR 1.40* 1.30* 1.18* 1.36*   GFRESTIMATED 54* 59* 67 56*   SARAH 9.3 9.5 9.0 9.5   ALBUMIN  --   --   --  4.5         Recent Labs   Lab 09/04/23  0752 09/02/23  0715 08/30/23  1529   WBC 20.8*  --  8.9   HGB 16.3  --  15.2   HCT 49.0  --  47.7   MCV 88  --  91    189 212            MEDICATIONS   amLODIPine  10 mg Oral Daily    aspirin  81 mg Oral Daily    calcium carbonate-vitamin D  1 tablet Oral BID w/meals    chlorthalidone  25 mg Oral Daily    heparin ANTICOAGULANT  5,000 Units Subcutaneous Q12H    insulin aspart  1-3 Units Subcutaneous TID AC    levothyroxine  112 mcg Oral Daily    predniSONE  60 mg Oral Daily    pyRIDostigmine  60 mg Oral TID    sodium chloride (PF)  3 mL Intracatheter Q8H

## 2023-09-04 NOTE — PROGRESS NOTES
[facility]    Medicine Progress Note - Hospitalist Service    Date of Admission:  8/30/2023    Assessment & Plan     Maurice Biswas is a 68 year old male with history of hypertension, hypothyroidism and myasthenia gravis presented with increasing fatigue and difficulty taking a deep breath as well as ptosis. Admitted for Myasthenia gravis exacerbation.      1.Myasthenia gravis exacerbation:  -He follows with [facility] and had been started on prednisone 60 mg daily for couple days PTA. He wants to switch clinics with neuro here  -on Mestinon.  His symptoms continued to get gradually worse including increasing fatigue and difficulty taking deep breaths particularly with activity.  Has reported some mild difficulty swallowing but not currently and did pass a bedside swallow.    - Neurology recommended plasmapheresis. Nephrology consulted for plasma exchange. Non-tunneled catheter placed 8/31. Plan plasmapheresis every other day for 3 sessions--he is much improved  - third session will be 9/5, then likely discontinue line   - Continue home mestinon and prednisone--WILL NEED NEURO TO LET US KNOW WHAT DOSE PREDNISONE THEY WANT AT DISCHARGE  - NIF monitoring     2.Chronic kidney disease stage IIIa with IgA nephropathy: Diagnosed with kidney biopsy August 2018.  Follows with Dr. Mcfarlane.   - Baseline appears to be between 1.6 and 2.3. Currently at baseline. Today creat 1.40  -Nephrology following for plasma exchange as above, greatly appreciate them     3.Steroid induced hyperglycemia: No history of diabetes, A1c 5.4 on 8/30/23.  - Blood sugar is mildly high. Novolog sliding scale.     4.Hypothyroidism: home Synthroid     5.Essential hypertension: Losartan held per nephrology  -Home amlodipine increased.            Diet: Regular Diet Adult    DVT Prophylaxis: Heparin SQ  Holbrook Catheter: Not present  Lines: PRESENT      CVC Double Lumen Right Internal jugular Non - tunneled-Site Assessment: WDL    "   Cardiac Monitoring: None  Code Status: Full Code      Clinically Significant Risk Factors                  # Hypertension: Noted on problem list        # Overweight: Estimated body mass index is 26.46 kg/m  as calculated from the following:    Height as of this encounter: 1.803 m (5' 10.98\").    Weight as of this encounter: 86 kg (189 lb 9.5 oz).             Disposition Plan     Expected Discharge Date: 09/05/2023        Discharge Comments: plasma exchange every other day x3 treatments and NIF monitoring. possible for outpatient plasma exchange but probably need to stay here a couple days at least to ensure symptoms turn around          Talia Bradford MD  Hospitalist Service  [facility]  Securely message with [employer] (more info)  Text page via [employer] Paging/Directory   ______________________________________________________________________    Interval History   He was up walking in room  Feels much better  No sob  Eating ok  Eyes feel better      Physical Exam   Vital Signs: Temp: 97.9  F (36.6  C) Temp src: Oral BP: 129/77 Pulse: 62   Resp: 18 SpO2: 98 % O2 Device: None (Room air)    Weight: 189 lbs 9.53 oz    Constitutional: awake, alert, cooperative, and no apparent distress  Respiratory: No increased work of breathing, good air exchange, clear to auscultation bilaterally, no crackles or wheezing  Cardiovascular: Normal apical impulse, regular rate and rhythm, normal S1 and S2, no S3 or S4, and no murmur noted  GI: normal bowel sounds, soft, non-distended, and non-tender  Skin: no bruising or bleeding  Musculoskeletal: no lower extremity pitting edema present  Neurologic: Mental Status Exam:  Level of Alertness:   awake  Motor Exam:  moves all extremities well and symmetrically  Neuropsychiatric: General: normal and calm    Medical Decision Making       35 MINUTES SPENT BY ME on the date of service doing chart review, history, exam, documentation & further activities per the note.  "     Data     I have personally reviewed the following data over the past 24 hrs:    20.8 (H)  \   16.3   / 173     136 98 28.7 (H) /  98   4.0 25 1.40 (H) \       Imaging results reviewed over the past 24 hrs:   No results found for this or any previous visit (from the past 24 hour(s)).

## 2023-09-04 NOTE — PROGRESS NOTES
RESPIRATORY CARE NOTE    Patient achieved NIF -60 and FVC 3.87 L. Both done with good effort.         Star [place], RT

## 2023-09-04 NOTE — PLAN OF CARE
Problem: Myasthenia Gravis  Goal: Optimal Coping with Myasthenia Gravis  Outcome: Progressing   Goal Outcome Evaluation:       Pt alert/oriented, denies pain. Restful night, no SOB or trouble swallowing. Call light within reach.

## 2023-09-04 NOTE — PROGRESS NOTES
"NEUROLOGY PROGRESS NOTE     ASSESSMENT & PLAN   Visit diagnosis: Myasthenia gravis exacerbation    Myasthenia gravis exacerbation  Hyperglycemia secondary to steroids    Suspected myasthenia gravis exacerbation with history of ocular myasthenia gravis  Chest x-ray negative.  CBC on admission showed elevated white blood cell count at 12.4 this could be related to steroids  Continue home dose of Mestinon and prednisone  3 days of plasmapheresis.  So far he is done 2 sessions with significant improvement  Sessions planned for 9/5  Consideration of CellCept as an outpatient-instead of steroids (probably would need to be done on outpatient basis due to hospital logistics).  Continue NIF/vital capacity/respiratory therapy  Supportive care  Sliding scale insulin for hyperglycemia related to steroids  Prior to admission losartan is being held.  Consider other medications for blood pressure management    Neurology Discharge Planning : After 3 sessions of plasmapheresis.  Possibly tomorrow.  Will need outpatient follow-up with Dr. Alamo.    Patient Active Problem List    Diagnosis Date Noted     Stage 3a chronic kidney disease (H) 08/31/2023     Priority: Medium     Primary hypertension 08/31/2023     Priority: Medium     Steroid-induced hyperglycemia 08/31/2023     Priority: Medium     Myasthenia gravis (H) 08/30/2023     Priority: Medium     Sinus bradycardia 04/22/2018     Priority: Medium     Abnormal LFTs 04/22/2018     Priority: Medium     Septic bursitis 04/21/2018     Priority: Medium     NILA (acute kidney injury) (H)      Priority: Medium     Anemia, unspecified type      Priority: Medium     Acquired hypothyroidism      Priority: Medium     Medical History  No past medical history on file.     SUBJECTIVE     Patient seen in follow for Dr. Shrestha  \"67 yo man admitted for myasthenia gravis exacerbation. Fast titration of prednisone as OP\"    Patient tells me that he has a history of myasthenia gravis.  Has been on " "Mestinon prednisone for a long time recently has had worsening.  Was admitted for Myasthenia Gravis Exacerbation.  Has had 1 session of plasmapheresis with 50% improvement.  Does have some diarrhea with the Mestinon.  Is interested in going on other medications in the future.  Did have some eyelid droop after the plasmapheresis yesterday because his medications were stopped.  No other new concerns.  Vital capacities have been appropriate per respiratory therapy notes.    9/3/23  Plasmapheresis scheduled for later today.  He is tolerating his CellCept and Mestinon.  Does have some concerns about the previous plasmapheresis session where he had some drooping of his eyelids because he did not get his medications.  Also is concerned about what time the testing will be done so we can plan accordingly.  No other new symptoms.    9/4/23  Tolerated the plasmapheresis well yesterday.  Remains on CellCept Mestinon and steroids without issues.  Does have left-sided eyelid droop.  He always has eyelid droop after the NIF vital capacities are checked.  Denies any breathing issues.  Encouraged him to rest to see if that would help with his eyelid droop.     OBJECTIVE     Vital signs in last 24 hours  Temp:  [97.3  F (36.3  C)-98  F (36.7  C)] 97.7  F (36.5  C)  Pulse:  [55-72] 60  Resp:  [18] 18  BP: (113-155)/(68-83) 138/70  SpO2:  [94 %-98 %] 98 %    Review of Systems   12 System ROS was done other than the HPI this was negative.    PHYSICAL EXAMINATION  VITALS: /70 (BP Location: Right arm)   Pulse 60   Temp 97.7  F (36.5  C) (Oral)   Resp 18   Ht 1.803 m (5' 10.98\")   Wt 86 kg (189 lb 9.5 oz)   SpO2 98%   BMI 26.46 kg/m    GENERAL -Health appearing, No apparent distress  EYES- No scleral icterus, no eyelid droop, Pupils - see Neuro section  HEENT - Normocephalic, atraumatic, Hearing grossly intact; Oral mucosa moist and pink in color. External Ears and nose intact.   Neck - supple  PULM - Good spontaneous " respiratory effort;  CV-extremities warm and well-perfused  MSK- Gait - see Neuro section; Strength and tone- see Neuro section; Range of motion grossly intact.  PSYCH -cooperative    Neurological  Mental status - Patient is awake and grossly oriented to self, place and time. Attention span is normal. Language is fluent and follows commands appropriately.   Cranial nerves - CN II-XII intact. Pupils are reactive and symmetric; EOMI, VFIT, NLF symmetric.  No eyelid droop noted.  Left-sided eyelid droop.  Motor - There is no pronator drift. Motor exam shows 5/5 strength in all extremities.  Tone - Tone is symmetric bilaterally in upper and lower extremities.  Sensation - Sensory exam is grossly intact to light touch, pain.  Coordination - Finger to nose is without dysmetria.  Gait and station --able to ambulate with assistance.  Formal gait testing cannot be done due to safety concerns from ongoing medical issues.  Exam unchanged to does have a eyelid droop on the left side.     DIAGNOSTIC STUDIES     Pertinent Radiology   CXR  INDICATION: Cough, Unspecified Type   COMPARISON: None.     Ferritin 120  TSH 2.3  Recent CMP noncontributory   A1c 5.4    Nephrology notes reviewed.  Component      Latest Ref Rng 9/4/2023  7:52 AM   Sodium      136 - 145 mmol/L 136    Potassium      3.4 - 5.3 mmol/L 4.0    Chloride      98 - 107 mmol/L 98    Carbon Dioxide (CO2)      22 - 29 mmol/L 25    Anion Gap      7 - 15 mmol/L 13    Urea Nitrogen      8.0 - 23.0 mg/dL 28.7 (H)    Creatinine      0.67 - 1.17 mg/dL 1.40 (H)    Calcium      8.8 - 10.2 mg/dL 9.3    Glucose      70 - 99 mg/dL 87    GFR Estimate      >60 mL/min/1.73m2 54 (L)    WBC      4.0 - 11.0 10e3/uL 20.8 (H)    RBC Count      4.40 - 5.90 10e6/uL 5.59    Hemoglobin      13.3 - 17.7 g/dL 16.3    Hematocrit      40.0 - 53.0 % 49.0    MCV      78 - 100 fL 88    MCH      26.5 - 33.0 pg 29.2    MCHC      31.5 - 36.5 g/dL 33.3    RDW      10.0 - 15.0 % 12.7    Platelet Count       150 - 450 10e3/uL 173       Legend:  (H) High  (L) Low  Recent Results (from the past 24 hour(s))   Glucose by meter    Collection Time: 09/03/23  2:06 PM   Result Value Ref Range    GLUCOSE BY METER POCT 119 (H) 70 - 99 mg/dL   Glucose by meter    Collection Time: 09/03/23  5:00 PM   Result Value Ref Range    GLUCOSE BY METER POCT 156 (H) 70 - 99 mg/dL   Glucose by meter    Collection Time: 09/03/23  8:55 PM   Result Value Ref Range    GLUCOSE BY METER POCT 180 (H) 70 - 99 mg/dL   Basic metabolic panel    Collection Time: 09/04/23  7:52 AM   Result Value Ref Range    Sodium 136 136 - 145 mmol/L    Potassium 4.0 3.4 - 5.3 mmol/L    Chloride 98 98 - 107 mmol/L    Carbon Dioxide (CO2) 25 22 - 29 mmol/L    Anion Gap 13 7 - 15 mmol/L    Urea Nitrogen 28.7 (H) 8.0 - 23.0 mg/dL    Creatinine 1.40 (H) 0.67 - 1.17 mg/dL    Calcium 9.3 8.8 - 10.2 mg/dL    Glucose 87 70 - 99 mg/dL    GFR Estimate 54 (L) >60 mL/min/1.73m2   CBC with platelets    Collection Time: 09/04/23  7:52 AM   Result Value Ref Range    WBC Count 20.8 (H) 4.0 - 11.0 10e3/uL    RBC Count 5.59 4.40 - 5.90 10e6/uL    Hemoglobin 16.3 13.3 - 17.7 g/dL    Hematocrit 49.0 40.0 - 53.0 %    MCV 88 78 - 100 fL    MCH 29.2 26.5 - 33.0 pg    MCHC 33.3 31.5 - 36.5 g/dL    RDW 12.7 10.0 - 15.0 %    Platelet Count 173 150 - 450 10e3/uL   Glucose by meter    Collection Time: 09/04/23  8:06 AM   Result Value Ref Range    GLUCOSE BY METER POCT 86 70 - 99 mg/dL         HOSPITAL MEDICATIONS       amLODIPine  10 mg Oral Daily     aspirin  81 mg Oral Daily     calcium carbonate-vitamin D  1 tablet Oral BID w/meals     chlorthalidone  25 mg Oral Daily     heparin ANTICOAGULANT  5,000 Units Subcutaneous Q12H     insulin aspart  1-3 Units Subcutaneous TID AC     levothyroxine  112 mcg Oral Daily     predniSONE  60 mg Oral Daily     pyRIDostigmine  60 mg Oral TID     sodium chloride (PF)  3 mL Intracatheter Q8H        Total time spent for face to face visit, reviewing  labs/imaging studies, counseling and coordination of care was: Over 35 min More than 50% of this time was spent on counseling and coordination of care.    Counseling patient.  Reviewing notes from nephrology.  Discussion of myasthenia gravis.    Dru Blanc MD  Neurologist  [facility]  Tel:- [phone]

## 2023-09-04 NOTE — PLAN OF CARE
Problem: Plan of Care - These are the overarching goals to be used throughout the patient stay.    Goal: Plan of Care Review  Description: The Plan of Care Review/Shift note should be completed every shift.  The Outcome Evaluation is a brief statement about your assessment that the patient is improving, declining, or no change.  This information will be displayed automatically on your shift note.  Outcome: Progressing   Goal Outcome Evaluation:         Pt denies pain this shift. Pt up independent in room. Pt wife visited. Pt BG checked per orders. Pt tolerating regular diet.

## 2023-09-05 ENCOUNTER — TELEPHONE (OUTPATIENT)
Dept: NEUROLOGY | Facility: CLINIC | Age: 68
End: 2023-09-05
Payer: COMMERCIAL

## 2023-09-05 VITALS
DIASTOLIC BLOOD PRESSURE: 72 MMHG | SYSTOLIC BLOOD PRESSURE: 121 MMHG | HEIGHT: 71 IN | BODY MASS INDEX: 26.54 KG/M2 | OXYGEN SATURATION: 98 % | RESPIRATION RATE: 18 BRPM | WEIGHT: 189.6 LBS | HEART RATE: 74 BPM | TEMPERATURE: 97.7 F

## 2023-09-05 DIAGNOSIS — G70.00 MYASTHENIA GRAVIS (H): Primary | ICD-10-CM

## 2023-09-05 LAB
ANION GAP SERPL CALCULATED.3IONS-SCNC: 10 MMOL/L (ref 7–15)
BUN SERPL-MCNC: 31 MG/DL (ref 8–23)
CALCIUM SERPL-MCNC: 9.3 MG/DL (ref 8.8–10.2)
CHLORIDE SERPL-SCNC: 98 MMOL/L (ref 98–107)
CREAT SERPL-MCNC: 1.46 MG/DL (ref 0.67–1.17)
DEPRECATED HCO3 PLAS-SCNC: 28 MMOL/L (ref 22–29)
GFR SERPL CREATININE-BSD FRML MDRD: 52 ML/MIN/1.73M2
GLUCOSE BLDC GLUCOMTR-MCNC: 139 MG/DL (ref 70–99)
GLUCOSE BLDC GLUCOMTR-MCNC: 90 MG/DL (ref 70–99)
GLUCOSE SERPL-MCNC: 89 MG/DL (ref 70–99)
PLATELET # BLD AUTO: 152 10E3/UL (ref 150–450)
POTASSIUM SERPL-SCNC: 4.1 MMOL/L (ref 3.4–5.3)
SODIUM SERPL-SCNC: 136 MMOL/L (ref 136–145)

## 2023-09-05 PROCEDURE — 250N000009 HC RX 250: Performed by: INTERNAL MEDICINE

## 2023-09-05 PROCEDURE — 250N000013 HC RX MED GY IP 250 OP 250 PS 637: Performed by: EMERGENCY MEDICINE

## 2023-09-05 PROCEDURE — 36415 COLL VENOUS BLD VENIPUNCTURE: CPT | Performed by: INTERNAL MEDICINE

## 2023-09-05 PROCEDURE — 250N000013 HC RX MED GY IP 250 OP 250 PS 637: Performed by: INTERNAL MEDICINE

## 2023-09-05 PROCEDURE — P9045 ALBUMIN (HUMAN), 5%, 250 ML: HCPCS | Mod: JZ | Performed by: INTERNAL MEDICINE

## 2023-09-05 PROCEDURE — 258N000003 HC RX IP 258 OP 636: Performed by: INTERNAL MEDICINE

## 2023-09-05 PROCEDURE — 80048 BASIC METABOLIC PNL TOTAL CA: CPT | Performed by: INTERNAL MEDICINE

## 2023-09-05 PROCEDURE — 85049 AUTOMATED PLATELET COUNT: CPT | Performed by: EMERGENCY MEDICINE

## 2023-09-05 PROCEDURE — 99232 SBSQ HOSP IP/OBS MODERATE 35: CPT | Performed by: PSYCHIATRY & NEUROLOGY

## 2023-09-05 PROCEDURE — 94150 VITAL CAPACITY TEST: CPT

## 2023-09-05 PROCEDURE — 999N000157 HC STATISTIC RCP TIME EA 10 MIN

## 2023-09-05 PROCEDURE — 250N000012 HC RX MED GY IP 250 OP 636 PS 637: Performed by: EMERGENCY MEDICINE

## 2023-09-05 PROCEDURE — 99239 HOSP IP/OBS DSCHRG MGMT >30: CPT | Performed by: INTERNAL MEDICINE

## 2023-09-05 PROCEDURE — 250N000011 HC RX IP 250 OP 636: Mod: JZ | Performed by: INTERNAL MEDICINE

## 2023-09-05 PROCEDURE — 250N000011 HC RX IP 250 OP 636: Mod: JZ | Performed by: EMERGENCY MEDICINE

## 2023-09-05 RX ORDER — ALBUMIN HUMAN 25 %
4000 INTRAVENOUS SOLUTION INTRAVENOUS ONCE
Status: COMPLETED | OUTPATIENT
Start: 2023-09-05 | End: 2023-09-05

## 2023-09-05 RX ORDER — PREDNISONE 20 MG/1
60 TABLET ORAL DAILY
Qty: 90 TABLET | Refills: 0 | Status: SHIPPED | OUTPATIENT
Start: 2023-09-05 | End: 2023-09-26

## 2023-09-05 RX ORDER — AMLODIPINE BESYLATE 10 MG/1
10 TABLET ORAL DAILY
Qty: 30 TABLET | Refills: 0 | Status: SHIPPED | OUTPATIENT
Start: 2023-09-06 | End: 2024-07-01

## 2023-09-05 RX ORDER — ANTICOAGULANT CITRATE DEXTROSE SOLUTION FORMULA A 12.25; 11; 3.65 G/500ML; G/500ML; G/500ML
500-1500 SOLUTION INTRAVENOUS ONCE
Status: COMPLETED | OUTPATIENT
Start: 2023-09-05 | End: 2023-09-05

## 2023-09-05 RX ORDER — LOSARTAN POTASSIUM 100 MG/1
100 TABLET ORAL DAILY
Start: 2023-09-06 | End: 2024-05-01

## 2023-09-05 RX ORDER — HEPARIN SODIUM 1000 [USP'U]/ML
2000-5000 INJECTION, SOLUTION INTRAVENOUS; SUBCUTANEOUS ONCE
Status: COMPLETED | OUTPATIENT
Start: 2023-09-05 | End: 2023-09-05

## 2023-09-05 RX ADMIN — LEVOTHYROXINE SODIUM 112 MCG: 0.11 TABLET ORAL at 09:09

## 2023-09-05 RX ADMIN — HEPARIN SODIUM 5000 UNITS: 10000 INJECTION, SOLUTION INTRAVENOUS; SUBCUTANEOUS at 09:09

## 2023-09-05 RX ADMIN — CHLORTHALIDONE 25 MG: 25 TABLET ORAL at 09:09

## 2023-09-05 RX ADMIN — PREDNISONE 60 MG: 20 TABLET ORAL at 09:09

## 2023-09-05 RX ADMIN — AMLODIPINE BESYLATE 10 MG: 5 TABLET ORAL at 09:09

## 2023-09-05 RX ADMIN — SODIUM CHLORIDE 1000 ML: 9 INJECTION, SOLUTION INTRAVENOUS at 10:00

## 2023-09-05 RX ADMIN — CALCIUM GLUCONATE 4 G: 98 INJECTION, SOLUTION INTRAVENOUS at 10:00

## 2023-09-05 RX ADMIN — Medication 1 TABLET: at 09:09

## 2023-09-05 RX ADMIN — PYRIDOSTIGMINE BROMIDE 60 MG: 60 TABLET ORAL at 13:51

## 2023-09-05 RX ADMIN — ANTICOAGULANT CITRATE DEXTROSE SOLUTION FORMULA A 1000 ML: 12.25; 11; 3.65 SOLUTION INTRAVENOUS at 10:00

## 2023-09-05 RX ADMIN — ALBUMIN HUMAN 4000 ML: 0.05 INJECTION, SOLUTION INTRAVENOUS at 10:00

## 2023-09-05 RX ADMIN — PYRIDOSTIGMINE BROMIDE 60 MG: 60 TABLET ORAL at 09:09

## 2023-09-05 RX ADMIN — Medication 81 MG: at 09:09

## 2023-09-05 ASSESSMENT — ACTIVITIES OF DAILY LIVING (ADL)
ADLS_ACUITY_SCORE: 18

## 2023-09-05 NOTE — PROGRESS NOTES
"     RENAL/ APHERESIS PROGRESS NOTE     CC: follow up myasthenia    Subjective: Since last seen, reports sob and dysphagia resolved.  Bp controlled. We discussed resuming losartan tomorrow.      ASSESSMENT:   69 yo male with CKD 3b, HTN, IgA nephropathy, myasthenia admit with worsening myasthenia symptoms.         PLAN:  Myasthenia - prior ocular myasthenia now with recent fatigue, sob, swallowing difficulty despite increase prednisone with recommendation for plasmaphresis every other day for 3 treatments per neurology  -consent for pheresis on chart  - S/P 1.5 volume exchange 9/1/23 tolerated well  - 1.5 volume exchange planned 9/3 and 9/5  - Will need insurance approval if monthly outpt TPE planned and access placement for this unless AC veins can be used. Seems risk of infection with PCAD for once monthly use may be too high...  -ok for discharge later today after pheresis. He  will need close follow up with neurology to guide prednisone tapering and further treatment  - I removed nontunneled dialysis catheter after pheresis today    2. CKD 3b- due to biopsy proven IgA nephropathy, baseline creat 1.2-1.9  -will continue to monitor, currently quite stable  - Follows with me     3. HTN - Improved. Holding Losartan due to pheresis but ok to resume tomorrow. Expect high dose prednisone contributing to worsened HTN  -increased amlodipine to 10 mg/day, cont on discharge  -Added chlorthalidone 25 mg/day temporarily, does not need to cont on discharge as would resume PTA losartan tomorrow.   -told to call me if sbp remains >130 after discharge for further med adjustment     Discussed management of myasthenia with pheresis with Dr Bradford    Objective    PHYSICAL EXAM  BP (P) 133/72   Pulse (P) 79   Temp 97.7  F (36.5  C) (Oral)   Resp 18   Ht 1.803 m (5' 10.98\")   Wt 86 kg (189 lb 9.5 oz)   SpO2 98%   BMI 26.46 kg/m    I/O last 3 completed shifts:  In: 720 [P.O.:720]  Out: -   Wt Readings from Last 3 Encounters: "   09/03/23 86 kg (189 lb 9.5 oz)       GENERAL: nad, talkative, alert  HEENT:normocephalic, atraumatic, left ptosis  CARDIOVASCULAR: RRR, no rub,  no edema  PULMONARY: CTA ant. No cyanosis or resp distress  GASTROINTESTINAL: soft, nt/nd  MSK: diffuse muscle atrophy, warm  PSYCHIATRIC: Appropriate mood and interaction  SKIN: RIJ CVC site clean. No rash.    LABORATORIES  Recent Labs   Lab 09/05/23  0655 09/04/23  0752 09/02/23  0715 08/31/23  0710 08/30/23  1529    136 137 140 139   POTASSIUM 4.1 4.0 4.5 4.3 4.6   CHLORIDE 98 98 99 105 103   CO2 28 25 29 28 24   BUN 31.0* 28.7* 26.5* 28.8* 31.4*   CR 1.46* 1.40* 1.30* 1.18* 1.36*   GFRESTIMATED 52* 54* 59* 67 56*   SARAH 9.3 9.3 9.5 9.0 9.5   ALBUMIN  --   --   --   --  4.5         Recent Labs   Lab 09/05/23  0655 09/04/23  0752 09/02/23  0715 08/30/23  1529   WBC  --  20.8*  --  8.9   HGB  --  16.3  --  15.2   HCT  --  49.0  --  47.7   MCV  --  88  --  91    173 189 212            MEDICATIONS   sodium chloride 0.9%  1,000-1,250 mL Intravenous Once    albumin human  4,000 mL Apheresis Once    amLODIPine  10 mg Oral Daily    anticoagulant citrate dextrose-A  500-1,500 mL Apheresis Once    aspirin  81 mg Oral Daily    calcium carbonate-vitamin D  1 tablet Oral BID w/meals    calcium gluconate  4 g Intravenous Once    chlorthalidone  25 mg Oral Daily    heparin ANTICOAGULANT  5,000 Units Subcutaneous Q12H    heparin Lock (1000 units/mL High concentration)  2,000-5,000 Units Intravenous Once    insulin aspart  1-3 Units Subcutaneous TID AC    levothyroxine  112 mcg Oral Daily    predniSONE  60 mg Oral Daily    pyRIDostigmine  60 mg Oral TID    sodium chloride (PF)  3 mL Intracatheter Q8H

## 2023-09-05 NOTE — PROGRESS NOTES
"NEUROLOGY PROGRESS NOTE     ASSESSMENT & PLAN   Visit diagnosis: Myasthenia gravis exacerbation    Myasthenia gravis exacerbation  Hyperglycemia secondary to steroids    Suspected myasthenia gravis exacerbation with history of ocular myasthenia gravis  Chest x-ray negative.  CBC on admission showed elevated white blood cell count at 12.4 this could be related to steroids  Continue home dose of Mestinon and prednisone  3 days of plasmapheresis.  So far he is done 2 sessions with significant improvement  Sessions planned for 9/5 (today)  Consideration of CellCept as an outpatient-instead of steroids (probably would need to be done on outpatient basis due to hospital logistics).  Continue NIF/vital capacity/respiratory therapy  Supportive care  Sliding scale insulin for hyperglycemia related to steroids  Prior to admission losartan is being held.  Consider other medications for blood pressure management    Neurology Discharge Planning : After plasmapheresis today  Will need outpatient follow-up with Dr. Alamo in 1 to 2 weeks to decide on the CellCept.  He is also interested in exploring some of the newer myasthenia gravis medications.    Patient Active Problem List    Diagnosis Date Noted     Stage 3a chronic kidney disease (H) 08/31/2023     Priority: Medium     Primary hypertension 08/31/2023     Priority: Medium     Steroid-induced hyperglycemia 08/31/2023     Priority: Medium     Myasthenia gravis (H) 08/30/2023     Priority: Medium     Sinus bradycardia 04/22/2018     Priority: Medium     Abnormal LFTs 04/22/2018     Priority: Medium     Septic bursitis 04/21/2018     Priority: Medium     NILA (acute kidney injury) (H)      Priority: Medium     Anemia, unspecified type      Priority: Medium     Acquired hypothyroidism      Priority: Medium     Medical History  No past medical history on file.     SUBJECTIVE     Patient seen in follow for Dr. Shrestha  \"69 yo man admitted for myasthenia gravis exacerbation. Fast " "titration of prednisone as OP\"    Patient tells me that he has a history of myasthenia gravis.  Has been on Mestinon prednisone for a long time recently has had worsening.  Was admitted for Myasthenia Gravis Exacerbation.  Has had 1 session of plasmapheresis with 50% improvement.  Does have some diarrhea with the Mestinon.  Is interested in going on other medications in the future.  Did have some eyelid droop after the plasmapheresis yesterday because his medications were stopped.  No other new concerns.  Vital capacities have been appropriate per respiratory therapy notes.    9/3/23  Plasmapheresis scheduled for later today.  He is tolerating his steroids and Mestinon.  Does have some concerns about the previous plasmapheresis session where he had some drooping of his eyelids because he did not get his medications.  Also is concerned about what time the testing will be done so we can plan accordingly.  No other new symptoms.    9/4/23  Tolerated the plasmapheresis well yesterday.  Remains on Mestinon and steroids without issues.  Does have left-sided eyelid droop.  He always has eyelid droop after the NIF vital capacities are checked.  Denies any breathing issues.  Encouraged him to rest to see if that would help with his eyelid droop.    9/5/23  Remains on Mestinon and steroids.  Does complain of GI side effects with the Mestinon.  Today would be the last dose of plasmapheresis.  Potentially will be discharged later today.  Eyelid droop is now improved.  No other new concerns.     OBJECTIVE     Vital signs in last 24 hours  Temp:  [97.5  F (36.4  C)-98  F (36.7  C)] 97.6  F (36.4  C)  Pulse:  [58-65] 64  Resp:  [17-18] 18  BP: (129-162)/(73-85) 141/79  FiO2 (%):  [21 %] 21 %  SpO2:  [96 %-98 %] 98 %    Review of Systems   12 System ROS was done other than the HPI this was negative.    PHYSICAL EXAMINATION  VITALS: BP (!) 141/79 (BP Location: Right arm)   Pulse 64   Temp 97.6  F (36.4  C) (Oral)   Resp 18   Ht " "1.803 m (5' 10.98\")   Wt 86 kg (189 lb 9.5 oz)   SpO2 98%   BMI 26.46 kg/m    GENERAL -Health appearing, No apparent distress  EYES- No scleral icterus, no eyelid droop, Pupils - see Neuro section  HEENT - Normocephalic, atraumatic, Hearing grossly intact; Oral mucosa moist and pink in color. External Ears and nose intact.   Neck - supple  PULM - Good spontaneous respiratory effort;  CV-extremities warm and well-perfused  MSK- Gait - see Neuro section; Strength and tone- see Neuro section; Range of motion grossly intact.  PSYCH -cooperative    Neurological  Mental status - Patient is awake and grossly oriented to self, place and time. Attention span is normal. Language is fluent and follows commands appropriately.   Cranial nerves - CN II-XII intact. Pupils are reactive and symmetric; EOMI, VFIT, NLF symmetric.  No eyelid droop noted.  Left-sided eyelid droop.  Motor - There is no pronator drift. Motor exam shows 5/5 strength in all extremities.  Tone - Tone is symmetric bilaterally in upper and lower extremities.  Sensation - Sensory exam is grossly intact to light touch, pain.  Coordination - Finger to nose is without dysmetria.  Gait and station --able to ambulate with assistance.  Formal gait testing cannot be done due to safety concerns from ongoing medical issues.  Exam stable.  Eyelid droop has now improved.     DIAGNOSTIC STUDIES     Pertinent Radiology   CXR  INDICATION: Cough, Unspecified Type   COMPARISON: None.     Ferritin 120  TSH 2.3  Recent CMP noncontributory   A1c 5.4    Nephrology notes reviewed.  Component      Latest Ref Rng 9/4/2023  7:52 AM   Sodium      136 - 145 mmol/L 136    Potassium      3.4 - 5.3 mmol/L 4.0    Chloride      98 - 107 mmol/L 98    Carbon Dioxide (CO2)      22 - 29 mmol/L 25    Anion Gap      7 - 15 mmol/L 13    Urea Nitrogen      8.0 - 23.0 mg/dL 28.7 (H)    Creatinine      0.67 - 1.17 mg/dL 1.40 (H)    Calcium      8.8 - 10.2 mg/dL 9.3    Glucose      70 - 99 mg/dL 87  "   GFR Estimate      >60 mL/min/1.73m2 54 (L)    WBC      4.0 - 11.0 10e3/uL 20.8 (H)    RBC Count      4.40 - 5.90 10e6/uL 5.59    Hemoglobin      13.3 - 17.7 g/dL 16.3    Hematocrit      40.0 - 53.0 % 49.0    MCV      78 - 100 fL 88    MCH      26.5 - 33.0 pg 29.2    MCHC      31.5 - 36.5 g/dL 33.3    RDW      10.0 - 15.0 % 12.7    Platelet Count      150 - 450 10e3/uL 173       Legend:  (H) High  (L) Low  Recent Results (from the past 24 hour(s))   Glucose by meter    Collection Time: 09/04/23 12:20 PM   Result Value Ref Range    GLUCOSE BY METER POCT 98 70 - 99 mg/dL   Glucose by meter    Collection Time: 09/04/23  5:09 PM   Result Value Ref Range    GLUCOSE BY METER POCT 141 (H) 70 - 99 mg/dL   Glucose by meter    Collection Time: 09/04/23  9:26 PM   Result Value Ref Range    GLUCOSE BY METER POCT 153 (H) 70 - 99 mg/dL   Platelet count    Collection Time: 09/05/23  6:55 AM   Result Value Ref Range    Platelet Count 152 150 - 450 10e3/uL   Basic metabolic panel    Collection Time: 09/05/23  6:55 AM   Result Value Ref Range    Sodium 136 136 - 145 mmol/L    Potassium 4.1 3.4 - 5.3 mmol/L    Chloride 98 98 - 107 mmol/L    Carbon Dioxide (CO2) 28 22 - 29 mmol/L    Anion Gap 10 7 - 15 mmol/L    Urea Nitrogen 31.0 (H) 8.0 - 23.0 mg/dL    Creatinine 1.46 (H) 0.67 - 1.17 mg/dL    Calcium 9.3 8.8 - 10.2 mg/dL    Glucose 89 70 - 99 mg/dL    GFR Estimate 52 (L) >60 mL/min/1.73m2   Glucose by meter    Collection Time: 09/05/23  8:02 AM   Result Value Ref Range    GLUCOSE BY METER POCT 90 70 - 99 mg/dL         HOSPITAL MEDICATIONS       amLODIPine  10 mg Oral Daily     aspirin  81 mg Oral Daily     calcium carbonate-vitamin D  1 tablet Oral BID w/meals     chlorthalidone  25 mg Oral Daily     heparin ANTICOAGULANT  5,000 Units Subcutaneous Q12H     insulin aspart  1-3 Units Subcutaneous TID AC     levothyroxine  112 mcg Oral Daily     predniSONE  60 mg Oral Daily     pyRIDostigmine  60 mg Oral TID     sodium chloride (PF)  3  mL Intracatheter Q8H        Total time spent for face to face visit, reviewing labs/imaging studies, counseling and coordination of care was: Over 35 min More than 50% of this time was spent on counseling and coordination of care.    Counseling patient.  Reviewing chart.  Discussion of myasthenia gravis.    Dru Blanc MD  Neurologist  [facility]  Tel:- [phone]

## 2023-09-05 NOTE — DISCHARGE SUMMARY
[facility] MEDICINE  DISCHARGE SUMMARY     Primary Care Physician: Esa Valdivia  Admission Date: 8/30/2023   Discharge Provider: Talia Bradford MD Discharge Date: 9/5/2023   Diet:   Active Diet and Nourishment Order   Procedures     Regular Diet Adult     Diet       Code Status: Full Code   Activity: DCACTIVITY: Activity as tolerated        Condition at Discharge: Stable     REASON FOR PRESENTATION(See Admission Note for Details)   Mg flare    PRINCIPAL & ACTIVE DISCHARGE DIAGNOSES     Principal Problem:    Myasthenia gravis (H)  Active Problems:    Acquired hypothyroidism    Stage 3a chronic kidney disease (H)    Primary hypertension    Steroid-induced hyperglycemia      PENDING LABS     Unresulted Labs Ordered in the Past 30 Days of this Admission       No orders found from 7/31/2023 to 8/31/2023.              PROCEDURES ( this hospitalization only)          RECOMMENDATIONS TO OUTPATIENT PROVIDER FOR F/U VISIT     Follow-up Appointments     Follow-up and recommended labs and tests       Follow up with primary care provider, Esa Valdivia, within 3-4 days for   hospital follow- up.  The following labs/tests are recommended: bmp.    Follow up with Dr. Hollins, neurology 1 week-his clinic will call you                DISPOSITION     Home    SUMMARY OF HOSPITAL COURSE:    Maurice Biswas is a 68 year old male with history of hypertension, hypothyroidism and myasthenia gravis presented with increasing fatigue and difficulty taking a deep breath as well as ptosis. Admitted for Myasthenia gravis exacerbation.      1.Myasthenia gravis exacerbation:  -He [facility] and had been started on prednisone 60 mg daily for couple days PTA. He wants to switch clinics with neuro here  -on Mestinon.  His symptoms continued to get gradually worse including increasing fatigue and difficulty taking deep breaths particularly with activity.  Has reported some mild difficulty swallowing  but not currently and did pass a bedside swallow.    - Neurology recommended plasmapheresis. Nephrology consulted for plasma exchange. Non-tunneled catheter placed 8/31. Plan plasmapheresis every other day for 3 sessions--he is much improved  - third session will be 9/5, then likely discontinue line   - Continue home mestinon and prednisone-I talked to neuro they want pred 60 mg daily and they will set up follow up in their clinic 1 week and will taper from there--neurology does NOT want antibiotics now for pcp prevention since they believe they will taper quickly after this next week  -I have warned pt not to stop pred on own and to follow close with neuro and to seek medical care immediately if fever or illness at all  - NIF monitoring     2.Chronic kidney disease stage IIIa with IgA nephropathy: Diagnosed with kidney biopsy August 2018.  Follows with Dr. Mcfarlane.   - Baseline appears to be between 1.6 and 2.3. Currently at baseline. Today creat 1.40  -Nephrology following for plasma exchange as above, greatly appreciate them     3.Steroid induced hyperglycemia: No history of diabetes, A1c 5.4 on 8/30/23.  - Blood sugar is mildly high. Novolog sliding scale.     4.Hypothyroidism: home Synthroid     5.Essential hypertension: Losartan held per nephrology  -Home amlodipine increased to 10 mg  -restart losartan tomorrow          Discharge Medications with Med changes:     Current Discharge Medication List        CONTINUE these medications which have CHANGED    Details   amLODIPine (NORVASC) 10 MG tablet Take 1 tablet (10 mg) by mouth daily  Qty: 30 tablet, Refills: 0    Associated Diagnoses: Primary hypertension      losartan (COZAAR) 100 MG tablet Take 1 tablet (100 mg) by mouth daily    Associated Diagnoses: Primary hypertension      predniSONE (DELTASONE) 20 MG tablet Take 3 tablets (60 mg) by mouth daily for 30 days  Qty: 90 tablet, Refills: 0    Associated Diagnoses: Myasthenia gravis (H)           CONTINUE  these medications which have NOT CHANGED    Details   aspirin 81 MG EC tablet Take 81 mg by mouth daily      Calcium Carbonate-Vitamin D (OYSTER SHELL CALCIUM/D) 500-5 MG-MCG TABS Take 1 tablet by mouth 2 times daily (with meals)      fish oil-omega-3 fatty acids 1000 MG capsule Take 2 capsules by mouth 2 times daily      fluticasone (FLONASE) 50 mcg/actuation nasal spray [FLUTICASONE (FLONASE) 50 MCG/ACTUATION NASAL SPRAY] 2 sprays into each nostril daily as needed for rhinitis.      levothyroxine (SYNTHROID/LEVOTHROID) 112 MCG tablet Take 112 mcg by mouth daily      pyRIDostigmine (MESTINON) 60 MG tablet Take 1 tablet by mouth 3 times daily                   Rationale for medication changes:      Amlodipine 10 mg   Pred 60 mg daily with plan neuro to taper starting in a week once they see him in clinic  Restart losartan tomorrow        Consults       NEUROLOGY IP CONSULT  NEPHROLOGY IP CONSULT  INTERVENTIONAL RADIOLOGY ADULT/PEDS IP CONSULT  PHARMACY IP CONSULT  PHARMACY IP CONSULT  PHARMACY IP CONSULT  PHARMACY IP CONSULT  PHARMACY IP CONSULT  PHARMACY IP CONSULT  PHARMACY IP CONSULT  PHARMACY IP CONSULT    Immunizations given this encounter     Most Recent Immunizations   Administered Date(s) Administered     COVID-19 Bivalent 12+ (Pfizer) 11/09/2022     COVID-19 MONOVALENT 12+ (Pfizer) 11/03/2021     COVID-19 Vaccine (Tyesha) 03/09/2021           Anticoagulation Information      Recent INR results: No results for input(s): INR in the last 168 hours.  Warfarin doses (if applicable) or name of other anticoagulant: none      SIGNIFICANT IMAGING FINDINGS     Results for orders placed or performed during the hospital encounter of 08/30/23   IR CVC Non Tunnel Placement > 5 Yrs    Impression    IMPRESSION:    1.  Successful non-tunneled dialysis catheter placement.  2.  The catheter is ready for use.         SIGNIFICANT LABORATORY FINDINGS     Most Recent 3 CBC's:  Recent Labs   Lab Test 09/05/23  0655 09/04/23  0752  09/02/23  0715 08/30/23  1529 01/16/23  1022   WBC  --  20.8*  --  8.9 12.4*   HGB  --  16.3  --  15.2 14.9   MCV  --  88  --  91 89    173 189 212 154     Most Recent 3 BMP's:  Recent Labs   Lab Test 09/05/23  0802 09/05/23  0655 09/04/23  2126 09/04/23  0806 09/04/23  0752 09/02/23  0837 09/02/23  0715   NA  --  136  --   --  136  --  137   POTASSIUM  --  4.1  --   --  4.0  --  4.5   CHLORIDE  --  98  --   --  98  --  99   CO2  --  28  --   --  25  --  29   BUN  --  31.0*  --   --  28.7*  --  26.5*   CR  --  1.46*  --   --  1.40*  --  1.30*   ANIONGAP  --  10  --   --  13  --  9   SARAH  --  9.3  --   --  9.3  --  9.5   GLC 90 89 153*   < > 87   < > 96    < > = values in this interval not displayed.     Most Recent 2 LFT's:  Recent Labs   Lab Test 08/30/23  1529 04/23/18  0607   AST 28 30   ALT 49 105*   ALKPHOS 59 84   BILITOTAL 1.1 0.6       IR CVC Non Tunnel Placement > 5 Yrs    Result Date: 8/31/2023  LOCATION: [facility] DATE: 8/31/2023 PROCEDURE: NON-TUNNELED DIALYSIS CATHETER PLACEMENT 1.  Insertion of a non-tunneled central venous catheter. 2.  Ultrasound guidance for vascular access. 3.  Fluoroscopic guidance for central venous access device placement. INTERVENTIONAL RADIOLOGIST: Brad Hartman MD INDICATION: Renal insufficiency requiring renal replacement therapy, plan for nontunneled dialysis catheter placement. CONSENT: The risks, benefits and alternatives of the procedure were discussed with the patient or representative in detail. All questions were answered. Informed consent was given to proceed with the procedure. MODERATE SEDATION: None. CONTRAST: None. ANTIBIOTICS: None. ADDITIONAL MEDICATIONS: None. FLUOROSCOPIC TIME: 0.7 minutes. RADIATION DOSE: Air Kerma: 7 mGy. COMPLICATIONS: No immediate complications. UNIVERSAL PROTOCOL: The operative site was marked and any prior imaging was reviewed. Required items including blood products, implants, devices and special  equipment was made available. Patient identity was confirmed either verbally, with demographic information, hospital assigned identification or other identification markers. A timeout was performed immediately prior to the procedure. STERILE BARRIER TECHNIQUE: Maximum sterile barrier technique was used. Cutaneous antisepsis was performed at the operative site with application of 2% chlorhexidine and large sterile drape. Prior to the procedure, the  and assistant performed hand hygiene and wore hat, mask, sterile gown, and sterile gloves during the entire procedure. PROCEDURE:  Using local anesthesia and real-time ultrasound guidance the right internal jugular vein was accessed. Imaging demonstrates an anechoic and compressible vein. A permanent image was stored to the patient's medical record. Using this access, a 20 cm dialysis catheter was advanced using fluoroscopic guidance until the tip was in the proximal right atrium. FINDINGS: At the completion of the study he radiograph was performed utilizing the in room fluoroscopic unit. Imaging demonstrates the nontunneled dialysis catheter tip terminating in the proximal right atrium.     IMPRESSION:  1.  Successful non-tunneled dialysis catheter placement. 2.  The catheter is ready for use.        Discharge Orders        Reason for your hospital stay    Weakness, MG flare     Follow-up and recommended labs and tests     Follow up with primary care provider, Esa Valdivia, within 3-4 days for hospital follow- up.  The following labs/tests are recommended: bmp.    Follow up with Dr. Hollins, neurology 1 week-his clinic will call you     Activity    Your activity upon discharge: activity as tolerated     Diet    Follow this diet upon discharge: Orders Placed This Encounter      Regular Diet Adult       Examination   Physical Exam   Temp:  [97.5  F (36.4  C)-98  F (36.7  C)] 97.7  F (36.5  C)  Pulse:  [58-79] 79  Resp:  [17-18] 18  BP: (129-162)/(71-85) 133/72  FiO2  (%):  [21 %] 21 %  SpO2:  [96 %-98 %] 98 %  Wt Readings from Last 1 Encounters:   09/05/23 86 kg (189 lb 9.5 oz)       See today's note      Please see EMR for more detailed significant labs, imaging, consultant notes etc.    I, Talia Bradford MD, personally saw the patient today and spent greater than 30 minutes discharging this patient.    Talia Bradford MD  [facility]    CC:Esa Valdivia

## 2023-09-05 NOTE — PLAN OF CARE
Pt discharged home with wife, all belongings take with pt. Discharge paperwork sent with and all questions answered.

## 2023-09-05 NOTE — PLAN OF CARE
"  Problem: Plan of Care - These are the overarching goals to be used throughout the patient stay.    Goal: Plan of Care Review  Outcome: Progressing     Pt up independently    Pt denies pain    Problem: Hypertension Comorbidity  Goal: Blood Pressure in Desired Range  Outcome: Progressing    BP (!) 141/79 (BP Location: Right arm)   Pulse 64   Temp 97.6  F (36.4  C) (Oral)   Resp 18   Ht 1.803 m (5' 10.98\")   Wt 86 kg (189 lb 9.5 oz)   SpO2 98%   BMI 26.46 kg/m        Problem: Myasthenia Gravis  Goal: Oral Intake without Aspiration  Outcome: Progressing    Last Plasmapheresis today.     Pt will follow up with neurology outpatient    "

## 2023-09-05 NOTE — TELEPHONE ENCOUNTER
----- Message from Raquel Choudhury MA sent at 9/5/2023 10:06 AM CDT -----  Can you help with this please?   ----- Message -----  From: Dru Blanc MD  Sent: 9/5/2023   9:53 AM CDT  To: Raquel Choudhury MA    Can you schedule this patient with Dr. Alamo in 1-2 weeks. Thanks.

## 2023-09-05 NOTE — PLAN OF CARE
Problem: Plan of Care - These are the overarching goals to be used throughout the patient stay.    Goal: Absence of Hospital-Acquired Illness or Injury  Intervention: Identify and Manage Fall Risk  Recent Flowsheet Documentation  Taken 9/5/2023 0217 by Michael Eduardo RN  Safety Promotion/Fall Prevention: clutter free environment maintained  Taken 9/4/2023 2104 by Michael Eduardo RN  Safety Promotion/Fall Prevention: clutter free environment maintained     Problem: Myasthenia Gravis  Goal: Symptom Stability  Intervention: Prevent or Manage Myasthenia Gravis Symptoms  Recent Flowsheet Documentation  Taken 9/5/2023 0217 by Michael Eduardo RN  Medication Review/Management: medications reviewed  Taken 9/4/2023 2104 by Michael Eduardo RN  Medication Review/Management: medications reviewed     Problem: Hypertension Comorbidity  Goal: Blood Pressure in Desired Range  Intervention: Maintain Blood Pressure Management  Recent Flowsheet Documentation  Taken 9/5/2023 0217 by Michael Eduardo RN  Medication Review/Management: medications reviewed  Taken 9/4/2023 2104 by Michael Eduardo RN  Medication Review/Management: medications reviewed   Goal Outcome Evaluation:       Patient denied fatigue, difficult breathing or swallowing. VSS. Patient denied pain or discomfort. Patient independent in the room. Non-tunneled catheter site appeared clean and intact.

## 2023-09-05 NOTE — PROGRESS NOTES
[facility]    Medicine Progress Note - Hospitalist Service    Date of Admission:  8/30/2023    Assessment & Plan   Maurice Biswas is a 68 year old male with history of hypertension, hypothyroidism and myasthenia gravis presented with increasing fatigue and difficulty taking a deep breath as well as ptosis. Admitted for Myasthenia gravis exacerbation.      1.Myasthenia gravis exacerbation:  -He [facility] and had been started on prednisone 60 mg daily for couple days PTA. He wants to switch clinics with neuro here  -on Mestinon.  His symptoms continued to get gradually worse including increasing fatigue and difficulty taking deep breaths particularly with activity.  Has reported some mild difficulty swallowing but not currently and did pass a bedside swallow.    - Neurology recommended plasmapheresis. Nephrology consulted for plasma exchange. Non-tunneled catheter placed 8/31. Plan plasmapheresis every other day for 3 sessions--he is much improved  - third session will be 9/5, then likely discontinue line   - Continue home mestinon and prednisone-I talked to neuro they want pred 60 mg daily and they will set up follow up in their clinic 1 week and will taper from there--neurology does NOT want antibiotics now for pcp prevention since they believe they will taper quickly after this next week  -I have warned pt not to stop pred on own and to follow close with neuro and to seek medical care immediately if fever or illness at all  - NIF monitoring     2.Chronic kidney disease stage IIIa with IgA nephropathy: Diagnosed with kidney biopsy August 2018.  Follows with Dr. Mcfarlane.   - Baseline appears to be between 1.6 and 2.3. Currently at baseline. Today creat 1.40  -Nephrology following for plasma exchange as above, greatly appreciate them     3.Steroid induced hyperglycemia: No history of diabetes, A1c 5.4 on 8/30/23.  - Blood sugar is mildly high. Novolog sliding scale.    "  4.Hypothyroidism: home Synthroid     5.Essential hypertension: Losartan held per nephrology  -Home amlodipine increased to 10 mg  -restart losartan tomorrow               Diet: Regular Diet Adult    DVT Prophylaxis: Heparin SQ  [place] Catheter: Not present  Lines: PRESENT      CVC Double Lumen Right Internal jugular Non - tunneled-Site Assessment: (P) WDL      Cardiac Monitoring: None  Code Status: Full Code      Clinically Significant Risk Factors                  # Hypertension: Noted on problem list        # Overweight: Estimated body mass index is 26.46 kg/m  as calculated from the following:    Height as of this encounter: 1.803 m (5' 10.98\").    Weight as of this encounter: 86 kg (189 lb 9.5 oz).             Disposition Plan     Expected Discharge Date: 09/05/2023        Discharge Comments: plasma exchange every other day x3 treatments and NIF monitoring. possible for outpatient plasma exchange but probably need to stay here a couple days at least to ensure symptoms turn around          Talia Bradford MD  Hospitalist Service  [facility]  Securely message with [employer] (more info)  Text page via [employer] Paging/Directory   ______________________________________________________________________    Interval History   He feels good  No c/o  Feels strong  No problem walking or breathing  Wants to go home  Feels eyes are good      Physical Exam   Vital Signs: Temp: 97.7  F (36.5  C) Temp src: Oral BP: (P) 133/72 Pulse: (P) 79   Resp: 18 SpO2: 98 % O2 Device: None (Room air)    Weight: 189 lbs 9.53 oz    Constitutional: awake, alert, and cooperative  Eyes: left eyeid slight ptosis  Respiratory: No increased work of breathing, good air exchange, clear to auscultation bilaterally, no crackles or wheezing  Cardiovascular: Normal apical impulse, regular rate and rhythm, normal S1 and S2, no S3 or S4, and no murmur noted  GI: normal bowel sounds, soft, non-distended, and non-tender  Skin: no " bruising or bleeding  Musculoskeletal: no lower extremity pitting edema present  Neurologic: Mental Status Exam:  Level of Alertness:   awake  Neuropsychiatric: General: normal, calm, and normal eye contact      Data     I have personally reviewed the following data over the past 24 hrs:    N/A  \   N/A   / 152     136 98 31.0 (H) /  90   4.1 28 1.46 (H) \       Imaging results reviewed over the past 24 hrs:   No results found for this or any previous visit (from the past 24 hour(s)).

## 2023-09-05 NOTE — TELEPHONE ENCOUNTER
"Patient was supposed to be started on CellCept while in hospital if this has not been started start patient on CellCept 500 mg 1 tab p.o. twice daily #60 with 6 refills put in prescription I will sign it.    Patient was \"interested in newer myasthenic medications these are usually complement inhibitors and carries increased risks of side effects\"  Usually we would have patients fail the standard treatments first    I had outlined a plan in my original consult 8/30/2023 to have outpatient plasma exchange once per month for 3 months while we are waiting for the CellCept to take effect.  We need to start it though for it to take effect please see if we can get patient placed on this    I do not have faster remedies at this time  Patient could follow-up in 4 weeks.  See if you can find a place for him in the schedule.  This would be an add-on patient thank you    leola Alamo MD on 9/5/2023 at 3:33 PM    "

## 2023-09-05 NOTE — TELEPHONE ENCOUNTER
Dr. Alamo-  Pt hospitalized 8/30/23-09/05/23 for myasthenia gravis crisis.  How soon would you like to pt for a follow up?    Auorra Lorenzo LPN on 9/5/2023 at 3:27 PM

## 2023-09-05 NOTE — PROGRESS NOTES
Treatment in patient room using Optia apheresis machine. Consent verified.     Height: 5 ft 11in  Weight: 86kg  HCT: 49%  Inlet speed: 90  ACDA ratio: 110:1  Fluid balance: 100  Access: internal jugular catheter     Replacement product: albumin 5%  Electrolyte replacement: Ca Gluconate 4grams in NS - total 100mls, piggybacked into return lines, infused over time of treatment.    Premedications: none     Treatment Notes: tolerated treatment well     Treatment completed as ordered, VSS, see EPIC flowsheet for run data.     Next treatment: none    Tawanna AIKEN RN

## 2023-09-06 RX ORDER — MYCOPHENOLATE MOFETIL 500 MG/1
500 TABLET ORAL 2 TIMES DAILY
Qty: 60 TABLET | Refills: 6 | Status: SHIPPED | OUTPATIENT
Start: 2023-09-06 | End: 2023-09-21

## 2023-09-06 NOTE — TELEPHONE ENCOUNTER
Let patient know that I signed medication prescription for the CellCept.  If the physicians in the hospital decided that he did not need any further plasma exchange we will hold off on that  See if he can provide us with some of his [facility] neurology notes before at the time of his visit.    As I am taking over his care as he is transferring to our clinic after review of his previous records I will guide us on choices for the future continue with medications that he was discharged on from the hospital and start the CellCept if it has not been started thank you.    leola Alamo MD on 9/6/2023 at 5:13 PM

## 2023-09-06 NOTE — TELEPHONE ENCOUNTER
"Summary of patient responses.    Patient was supposed to be started on CellCept while in hospital if this has not been started start patient on CellCept 500 mg 1 tab p.o. twice daily #60 with 6 refills put in prescription I will sign it. Patient has not been started on this. Medication is pended for your signature.     Patient was \"interested in newer myasthenic medications these are usually complement inhibitors and carries increased risks of side effects\"  Usually we would have patients fail the standard treatments first, Patient is definitely fine with trying the standard treatments first.     I had outlined a plan in my original consult 8/30/2023 to have outpatient plasma exchange once per month for 3 months while we are waiting for the CellCept to take effect.  We need to start it though for it to take effect please see if we can get patient placed on this  Patient mentions that he believes he did receive this treatment type (3 times every other day) and was given the impression by staff persons or other providers that this wasn't the necessary treatment method for his specific case (given being on prednisone), but patient states understanding that they will defer to the better judgment of the neurologist that is going to be followed by and involved in his care.     I do not have faster remedies at this time  Patient could follow-up in 4 weeks.  See if you can find a place for him in the schedule.  Patient has been set-up with appointment - scheduled 9/26 at 2:45PM      Routing to provider and care team to continue coordination of care.    Gerald Dubon, RN, BSN  [facility]    "

## 2023-09-07 NOTE — TELEPHONE ENCOUNTER
Spoke to pt, relayed that the rx was sent to the pharmacy.  Do you still want him to remain on the prednisone as well? Currently taking 60mg once a day.    Aurora Lorenzo LPN on 9/7/2023 at 2:29 PM

## 2023-09-08 NOTE — TELEPHONE ENCOUNTER
Spoke to pt, relayed Dr. Alamo message below.  Pt verbalizes understanding.    Aurora Lorenzo LPN on 9/8/2023 at 9:39 AM

## 2023-09-20 NOTE — PROGRESS NOTES
"Addendum 10/31/2023  In person evaluation        HPI  8/30/2023, hospital consultation  9/26/2023, in person visit    68-year-old being evaluated neurologically for:  Myasthenia gravis    Patient had plasma exchange in the hospital  Some reports that he had improvement saying that he did not  His exam shows significant improvement since I saw him in the ER    Patient currently on fairly maximized myasthenia gravis treatment  CellCept 750 mg p.o. twice daily (increased by my partner recently in September)  Prednisone 20 mg tablet, 60 mg daily we will start reducing this now  Mestinon 60 mg 4 times daily    Does have about 6 bowel movements per day but not when he does not want to be on Lomotil  Seem to be well And fairly intense says he always do things fast though not sure if there is a prednisone effect  No tremor      Side effects of CellCept/prednisone  The diagnosis of myasthenia gravis and that we should check a CT of the chest to rule out thymic tumor    9/26/2023.  No shortness of breath  Talks and paragraphs  Some difficulty with swallowing in the morning but okay once he starts his meds  No significant diplopia  A little bit of left eyelid ptosis but not as bad as before  No proximal muscle weakness  Complains of feeling \"tired\"          A.  Myasthenia gravis        Myasthenia gravis crises 8/30/2023        ER 8/30/2023 with increasing symptoms from his myasthenia gravis.        Has had the diagnosis for at least 2 years followed at the [facility]         Recently seen at the [facility] day before admission has been on prednisone 60 mg for couple of days.           Patient had increasing fatigue during the month of August has seen his primary regard to this.           9/26/2023.  No shortness of breath  Talks and paragraphs  Some difficulty with swallowing in the morning but okay once he starts his meds  No significant diplopia  A little bit of left eyelid ptosis but not as bad as before  No proximal " "muscle weakness  Complains of feeling \"tired\"           Able to talk in long sentences         Able to swallow okay at bedside    B.  CKD 3 a, secondary to IgA nephropathy        Followed by Dr. Mcfarlane of [facility] [place] ([phone] for kidney disease)        CKD 3a, Ig A nephropathy.         Kidney biopsy done in August 2018 with IgA nephropathy with moderate interstitial fibrosis and tubular atrophy     Patient states that he is kidney doctor was okay with IV IgG and the osmotic load if necessary in the future.      Past neurologic history  Onset of ptosis and diplopia possibly back in August 2021  Diagnosed at the [facility] clinic with what sounds like predominantly ocular myasthenia gravis  Treated with Mestinon and no immunosuppression until just recently August 2023 August 2023 developed fatigue/some air hunger, increased ptosis increased diplopia some trouble with swallowing.  With these symptoms he has moved to more generalized myasthenia gravis    He was started on steroids fairly recently in August and ramped up fairly quickly  In some patients they can get slight worsening of their myasthenia gravis before it improves with a quick increase in prednisone.    Recommend short course of plasma exchange to bring things under control August 30, 2023 when he presented to the ER now stabilized        Past medical history  Myasthenia gravis (November 2021)  CKD IgA nephropathy  Hypertension  Hypothyroidism  Prostatism/prostate cancer  Anemia  Psoriasis  Kidney stone  Adenomatous polyp    Habits  Past smoker quit  Does not drink alcohol    Family history  Father colon cancer  Mother heart disease/hypertension  Maternal aunt heart disease  Sister hypertension      Work-up           8/30/2023  NIF       -40  VC       2.38    Labs  Sodium 139 potassium 4.6  BUN 31.4, creatinine 1.36  AST 28  Glucose 222  WBC 8.9, hemoglobin 15.2, platelets 212,000    CT chest 10/30/2023  No thymic mass or other " "acute cardiopulmonary abnormality.     Exam  Review of systems  No severe diplopia  No dysarthria  Minimal dysphagia in the morning before his meds trouble with serial  No shortness of breath  Talks in paragraphs  Complains of \"generalized fatigue\" but he does everything fast  Left ptosis improved  No proximal weakness    Some loose stool  No difficulty with saliva but has had increased saliva when he is going to the dentist well on Mestinon    Able to ambulate    Otherwise review systems negative  General exam  Blood pressure 120/69, pulse 58  Alert orient x3  HEENT minimal ptosis left eye after extended upgaze  Lungs clear  Heart rate regular  Abdomen soft  Symmetrical pulses  No edema in the feet        Neurologic exam  Alert orient x3  Normal prosody of speech  Normal naming  Normal comprehension  Normal repetition  No aphasia  No neglect  Memory recall okay    Cranials 2 through 12  No ophthalmoplegia  Minimal ptosis left eye after extended upgaze  Eye closure strong  Mouth closure strong  Visual fields intact  Face symmetrical  Tongue twisters good      Neck flexors strong  Neck extensors strong    Upper extremities reported right over left  Deltoid 5/5  Biceps 5/5  Triceps 5/5  Wrist/finger extensors 5/5  Wrist/finger flexors 5/5  Intrinsic hand strength 5/5    No drift no tremor normal rapid alternating movements    Lower extremities reported right over left  Iliopsoas 5/5  Quadriceps 5/5  Hamstring 5/5  Anterior tibial 5/5    Gait  Able to stand up without difficulty marches in place climbs up on the exam table does a deep knee bend          Assessment/plan    Myasthenia gravis exacerbation       Onset November 2021/diagnosis at the [facility]    2.  CKD stage III with IgA nephropathy with moderate interstitial fibrosis and tubular atrophy on previous biopsy       Concern with above renal disease patient not a good candidate for IV IgG.    Diagnosis  Myasthenia gravis exacerbation  CKD stage III with IgA " nephropathy    Patient's nephrologist said that IV IgG would be okay osmotic load would be okay for the kidney  We would stay for this for if he has a significant exacerbation    Current plan  Check CT scan of the chest rule out thymic tumor    CellCept 750 mg twice daily (just started in September 2023), may be able to decrease dose in the future  Prednisone slow taper (was on 60 mg daily)  Taper started 9/26/2023  Prednisone 60 mg on the odd day, 40 mg on the even day  Mestinon 60 mg 4 times daily (6 stool per day does not want to start Lomotil not significant to patient will keep an eye on this)    Rediscussed the diagnosis and treatment risks and benefits of various treatments as above  Also talked about Rituxan and other treatments should they become necessary  Talked about the more generalized nature of his myasthenia gravis at this time    Patient will follow-up in January 2024  Goal would be is to taper down on the prednisone and get him to every other day dosing so he has less side effects      Prolonged visit reviewing and discussing the above    Total care time today 77 minutes  Addendum 10/31/2023  CT scan chest 10/30/2023  No thymic mass or other acute cardiopulmonary abnormality.

## 2023-09-21 ENCOUNTER — TELEPHONE (OUTPATIENT)
Dept: NEUROLOGY | Facility: CLINIC | Age: 68
End: 2023-09-21
Payer: COMMERCIAL

## 2023-09-21 DIAGNOSIS — G70.00 MYASTHENIA GRAVIS (H): ICD-10-CM

## 2023-09-21 RX ORDER — PYRIDOSTIGMINE BROMIDE 60 MG/1
60 TABLET ORAL 4 TIMES DAILY
Qty: 120 TABLET | Refills: 11 | Status: SHIPPED | OUTPATIENT
Start: 2023-09-21 | End: 2023-09-26

## 2023-09-21 RX ORDER — MYCOPHENOLATE MOFETIL 500 MG/1
750 TABLET ORAL 2 TIMES DAILY
Qty: 60 TABLET | Refills: 6 | Status: SHIPPED | OUTPATIENT
Start: 2023-09-21 | End: 2023-09-26

## 2023-09-21 NOTE — TELEPHONE ENCOUNTER
I would recommend increasing the dose of Mestinon to 60 mg 4 times daily and also increasing the dose of CellCept to 750 mg twice daily.  No change in the dose of prednisone 60 mg daily.  If he continues to have swallowing difficulty with any breathing issues, he should go to the emergency room

## 2023-09-21 NOTE — TELEPHONE ENCOUNTER
Returned call to Maurice, discussed recommended increased doses of Cellcept and mestinon as detailed below by Dr. Hyatt. He is agreeable to take increased doses.     Will follow up next week as planned and present to ER if he has any breathing difficulty or coughing/choking with swallowing.     Deshawn Pedro RN, BSN  [facility]

## 2023-09-21 NOTE — TELEPHONE ENCOUNTER
Patient reports increased difficulty swallowing in the morning that has been ongoing the last 4 days. Also feels that he doesn't have good tongue control. He is able to take pills with water without difficulty, but is struggling with food- specifically when he is trying to eat morning cereal.     Swallowing improves once AM dose of prednisone is taken. Has less difficulty swallowing food later in day.     Pt denies any cough/choking when eating, denies cough.     He denies any difficulty breathing or ptosis which are symptoms that typically occur when he has a MG exacerbation. He did have mild swallowing difficulty when hospitalized in early Sept, but this swallowing difficulty is worse and only with food, no trouble with liquids.     Dr. Hyatt please advise as Dr. Alamo is out of office, pt has follow up appt next week.     Deshawn Pedro RN, BSN  [facility]

## 2023-09-21 NOTE — TELEPHONE ENCOUNTER
[facility] Call Center    Phone Message    May a detailed message be left on voicemail: yes     Reason for Call: Other: Maurice is returning a missed call to clinic, please call back at [phone]     Action Taken: Other: [facility]    Travel Screening: Not Applicable

## 2023-09-21 NOTE — TELEPHONE ENCOUNTER
LMTRC, will relay MD message below upon return call.    Deshawn Pedro, RN, BSN  [facility]

## 2023-09-21 NOTE — TELEPHONE ENCOUNTER
Health Call Center    Phone Message    May a detailed message be left on voicemail: yes     Reason for Call: Symptoms or Concerns     If patient has red-flag symptoms, warm transfer to triage line    Current symptom or concern: Difficulty swallowing     Symptoms have been present for:  4 day(s)    Has patient previously been seen for this? Yes    By Leonel Alamo      Are there any new or worsening symptoms? Yes: Pt states he is having difficulty swallowing his food for the last 4 days. Please call pt back to advise at # [phone] and # [phone].    Action Taken: Message routed to:  Other: [facility]     Travel Screening: Not Applicable

## 2023-09-26 ENCOUNTER — OFFICE VISIT (OUTPATIENT)
Dept: NEUROLOGY | Facility: CLINIC | Age: 68
End: 2023-09-26
Payer: COMMERCIAL

## 2023-09-26 VITALS
SYSTOLIC BLOOD PRESSURE: 120 MMHG | BODY MASS INDEX: 25.9 KG/M2 | HEART RATE: 58 BPM | WEIGHT: 185 LBS | DIASTOLIC BLOOD PRESSURE: 69 MMHG | HEIGHT: 71 IN

## 2023-09-26 DIAGNOSIS — G70.00 MYASTHENIA GRAVIS (H): Primary | ICD-10-CM

## 2023-09-26 PROCEDURE — 99215 OFFICE O/P EST HI 40 MIN: CPT | Performed by: PSYCHIATRY & NEUROLOGY

## 2023-09-26 RX ORDER — PYRIDOSTIGMINE BROMIDE 60 MG/1
60 TABLET ORAL 4 TIMES DAILY
Qty: 120 TABLET | Refills: 11 | Status: SHIPPED | OUTPATIENT
Start: 2023-09-26 | End: 2024-01-10

## 2023-09-26 RX ORDER — MYCOPHENOLATE MOFETIL 500 MG/1
750 TABLET ORAL 2 TIMES DAILY
Qty: 90 TABLET | Refills: 11 | Status: SHIPPED | OUTPATIENT
Start: 2023-09-26 | End: 2024-01-10

## 2023-09-26 RX ORDER — PREDNISONE 20 MG/1
TABLET ORAL
Qty: 75 TABLET | Refills: 11 | Status: SHIPPED | OUTPATIENT
Start: 2023-09-26 | End: 2023-11-11

## 2023-09-26 NOTE — LETTER
"    9/26/2023         RE: Maurice Biswas  [address]  [place] [address]        Dear Colleague,    Thank you for referring your patient, Maurice Biswas, to the [facility] [place]. Please see a copy of my visit note below.    In person evaluation        HPI  8/30/2023, hospital consultation  9/26/2023, in person visit    68-year-old being evaluated neurologically for:  Myasthenia gravis    Patient had plasma exchange in the hospital  Some reports that he had improvement saying that he did not  His exam shows significant improvement since I saw him in the ER    Patient currently on fairly maximized myasthenia gravis treatment  CellCept 750 mg p.o. twice daily (increased by my partner recently in September)  Prednisone 20 mg tablet, 60 mg daily we will start reducing this now  Mestinon 60 mg 4 times daily    Does have about 6 bowel movements per day but not when he does not want to be on Lomotil  Seem to be well And fairly intense says he always do things fast though not sure if there is a prednisone effect  No tremor      Side effects of CellCept/prednisone  The diagnosis of myasthenia gravis and that we should check a CT of the chest to rule out thymic tumor    9/26/2023.  No shortness of breath  Talks and paragraphs  Some difficulty with swallowing in the morning but okay once he starts his meds  No significant diplopia  A little bit of left eyelid ptosis but not as bad as before  No proximal muscle weakness  Complains of feeling \"tired\"          A.  Myasthenia gravis        Myasthenia gravis crises 8/30/2023        ER 8/30/2023 with increasing symptoms from his myasthenia gravis.        Has had the diagnosis for at least 2 years followed at the [facility]         Recently seen at the [facility] day before admission has been on prednisone 60 mg for couple of days.           Patient had increasing fatigue during the month of August has seen his primary regard to this.           " "9/26/2023.  No shortness of breath  Talks and paragraphs  Some difficulty with swallowing in the morning but okay once he starts his meds  No significant diplopia  A little bit of left eyelid ptosis but not as bad as before  No proximal muscle weakness  Complains of feeling \"tired\"           Able to talk in long sentences         Able to swallow okay at bedside    B.  CKD 3 a, secondary to IgA nephropathy        Followed by Dr. Mcfarlane of [facility] [place] ([phone] for kidney disease)        CKD 3a, Ig A nephropathy.         Kidney biopsy done in August 2018 with IgA nephropathy with moderate interstitial fibrosis and tubular atrophy     Patient states that he is kidney doctor was okay with IV IgG and the osmotic load if necessary in the future.      Past neurologic history  Onset of ptosis and diplopia possibly back in August 2021  Diagnosed at the [facility] clinic with what sounds like predominantly ocular myasthenia gravis  Treated with Mestinon and no immunosuppression until just recently August 2023 August 2023 developed fatigue/some air hunger, increased ptosis increased diplopia some trouble with swallowing.  With these symptoms he has moved to more generalized myasthenia gravis    He was started on steroids fairly recently in August and ramped up fairly quickly  In some patients they can get slight worsening of their myasthenia gravis before it improves with a quick increase in prednisone.    Recommend short course of plasma exchange to bring things under control August 30, 2023 when he presented to the ER now stabilized        Past medical history  Myasthenia gravis (November 2021)  CKD IgA nephropathy  Hypertension  Hypothyroidism  Prostatism/prostate cancer  Anemia  Psoriasis  Kidney stone  Adenomatous polyp    Habits  Past smoker quit  Does not drink alcohol    Family history  Father colon cancer  Mother heart disease/hypertension  Maternal aunt heart disease  Sister " "hypertension      Work-up           8/30/2023  NIF       -40  VC       2.38    Labs  Sodium 139 potassium 4.6  BUN 31.4, creatinine 1.36  AST 28  Glucose 222  WBC 8.9, hemoglobin 15.2, platelets 212,000      Exam  Review of systems  No severe diplopia  No dysarthria  Minimal dysphagia in the morning before his meds trouble with serial  No shortness of breath  Talks in paragraphs  Complains of \"generalized fatigue\" but he does everything fast  Left ptosis improved  No proximal weakness    Some loose stool  No difficulty with saliva but has had increased saliva when he is going to the dentist well on Mestinon    Able to ambulate    Otherwise review systems negative  General exam  Blood pressure 120/69, pulse 58  Alert orient x3  HEENT minimal ptosis left eye after extended upgaze  Lungs clear  Heart rate regular  Abdomen soft  Symmetrical pulses  No edema in the feet        Neurologic exam  Alert orient x3  Normal prosody of speech  Normal naming  Normal comprehension  Normal repetition  No aphasia  No neglect  Memory recall okay    Cranials 2 through 12  No ophthalmoplegia  Minimal ptosis left eye after extended upgaze  Eye closure strong  Mouth closure strong  Visual fields intact  Face symmetrical  Tongue twisters good      Neck flexors strong  Neck extensors strong    Upper extremities reported right over left  Deltoid 5/5  Biceps 5/5  Triceps 5/5  Wrist/finger extensors 5/5  Wrist/finger flexors 5/5  Intrinsic hand strength 5/5    No drift no tremor normal rapid alternating movements    Lower extremities reported right over left  Iliopsoas 5/5  Quadriceps 5/5  Hamstring 5/5  Anterior tibial 5/5    Gait  Able to stand up without difficulty marches in place climbs up on the exam table does a deep knee bend          Assessment/plan    Myasthenia gravis exacerbation       Onset November 2021/diagnosis at the [facility]    2.  CKD stage III with IgA nephropathy with moderate interstitial fibrosis and tubular atrophy " on previous biopsy       Concern with above renal disease patient not a good candidate for IV IgG.    Diagnosis  Myasthenia gravis exacerbation  CKD stage III with IgA nephropathy    Patient's nephrologist said that IV IgG would be okay osmotic load would be okay for the kidney  We would stay for this for if he has a significant exacerbation    Current plan  Check CT scan of the chest rule out thymic tumor    CellCept 750 mg twice daily (just started in September 2023), may be able to decrease dose in the future  Prednisone slow taper (was on 60 mg daily)  Taper started 9/26/2023  Prednisone 60 mg on the odd day, 40 mg on the even day  Mestinon 60 mg 4 times daily (6 stool per day does not want to start Lomotil not significant to patient will keep an eye on this)    Rediscussed the diagnosis and treatment risks and benefits of various treatments as above  Also talked about Rituxan and other treatments should they become necessary  Talked about the more generalized nature of his myasthenia gravis at this time    Patient will follow-up in January 2024  Goal would be is to taper down on the prednisone and get him to every other day dosing so he has less side effects      Prolonged visit reviewing and discussing the above    Total care time today 77 minutes          Again, thank you for allowing me to participate in the care of your patient.        Sincerely,        leola Alamo MD

## 2023-09-26 NOTE — LETTER
October 31, 2023      Maurice Biswas  [address] [place] [address]        Dear ,    We are writing to inform you of your test results.    CT scan chest 10/30/2023  No thymic mass or other acute cardiopulmonary abnormality.   CT scan chest above looks good continue as planned  Keep follow-up visit 1/10/2024    If you have any questions or concerns, please call the clinic at the number listed above.       Sincerely,    Dr. Leonel Alamo

## 2023-09-26 NOTE — NURSING NOTE
Chief Complaint   Patient presents with    Hospital F/U     Follow up for Myasthenia gravis. Pt states he is still having trouble swallowing in the morning, especially in the mornings.     Aurora Lorenzo LPN on 9/26/2023 at 3:02 PM

## 2023-10-11 ENCOUNTER — TRANSFERRED RECORDS (OUTPATIENT)
Dept: HEALTH INFORMATION MANAGEMENT | Facility: CLINIC | Age: 68
End: 2023-10-11

## 2023-10-11 ENCOUNTER — TELEPHONE (OUTPATIENT)
Dept: NEUROLOGY | Facility: CLINIC | Age: 68
End: 2023-10-11
Payer: COMMERCIAL

## 2023-10-11 NOTE — TELEPHONE ENCOUNTER
[facility] Call Center    Phone Message    May a detailed message be left on voicemail: yes     Reason for Call: Medication Refill Request    Has the patient contacted the pharmacy for the refill? Yes   Name of medication being requested:   amLODIPine (NORVASC) 10 MG tablet    Provider who prescribed the medication:   Talia Bradford MD       Pharmacy:   [id number] [address]       Date medication is needed: ASAP    Patient states that this medication was prescribed to him while he was inpatient and he's not sure who he suppose to get a refill from.    Pt is also going to check in with his pcp for prescription refill. Pt unsure who to contact.     Please reach out to patient to further advise at  # [phone] (Home Phone)     Thanks       Action Taken: Other: [facility] neurolog[facility]    Travel Screening: Not Applicable

## 2023-10-12 NOTE — TELEPHONE ENCOUNTER
Called and spoke with patient, conveying that this medication refill request should be addressed by Pcp.    Patient states understanding and will handle with primary care.    Gerald Dubon RN, BSN  [facility]

## 2023-10-30 ENCOUNTER — HOSPITAL ENCOUNTER (OUTPATIENT)
Dept: CT IMAGING | Facility: HOSPITAL | Age: 68
Discharge: HOME | End: 2023-10-30
Attending: PSYCHIATRY & NEUROLOGY | Admitting: PSYCHIATRY & NEUROLOGY
Payer: COMMERCIAL

## 2023-10-30 DIAGNOSIS — G70.00 MYASTHENIA GRAVIS (H): ICD-10-CM

## 2023-10-30 LAB
CREAT BLD-MCNC: 1.3 MG/DL (ref 0.7–1.3)
EGFRCR SERPLBLD CKD-EPI 2021: 59 ML/MIN/1.73M2

## 2023-10-30 PROCEDURE — 250N000011 HC RX IP 250 OP 636: Mod: JZ | Performed by: PSYCHIATRY & NEUROLOGY

## 2023-10-30 PROCEDURE — 82565 ASSAY OF CREATININE: CPT

## 2023-10-30 PROCEDURE — 71260 CT THORAX DX C+: CPT

## 2023-10-30 RX ORDER — IOPAMIDOL 755 MG/ML
75 INJECTION, SOLUTION INTRAVASCULAR ONCE
Status: COMPLETED | OUTPATIENT
Start: 2023-10-30 | End: 2023-10-30

## 2023-10-30 RX ADMIN — IOPAMIDOL 75 ML: 755 INJECTION, SOLUTION INTRAVENOUS at 14:12

## 2023-11-11 ENCOUNTER — APPOINTMENT (OUTPATIENT)
Dept: RADIOLOGY | Facility: HOSPITAL | Age: 68
End: 2023-11-11
Attending: EMERGENCY MEDICINE
Payer: COMMERCIAL

## 2023-11-11 ENCOUNTER — ANESTHESIA EVENT (OUTPATIENT)
Dept: MEDSURG UNIT | Facility: HOSPITAL | Age: 68
End: 2023-11-11
Payer: COMMERCIAL

## 2023-11-11 ENCOUNTER — APPOINTMENT (OUTPATIENT)
Dept: MRI IMAGING | Facility: HOSPITAL | Age: 68
End: 2023-11-11
Attending: EMERGENCY MEDICINE
Payer: COMMERCIAL

## 2023-11-11 ENCOUNTER — HOSPITAL ENCOUNTER (OUTPATIENT)
Facility: HOSPITAL | Age: 68
Setting detail: OBSERVATION
Discharge: HOME | End: 2023-11-12
Attending: EMERGENCY MEDICINE | Admitting: EMERGENCY MEDICINE
Payer: COMMERCIAL

## 2023-11-11 DIAGNOSIS — M65.149 SUPPURATIVE TENOSYNOVITIS OF FLEXOR TENDON: Primary | ICD-10-CM

## 2023-11-11 PROBLEM — M79.645 PAIN OF FINGER OF LEFT HAND: Status: ACTIVE | Noted: 2023-11-11

## 2023-11-11 LAB
ANION GAP SERPL CALCULATED.3IONS-SCNC: 9 MMOL/L (ref 7–15)
BASOPHILS # BLD AUTO: 0 10E3/UL (ref 0–0.2)
BASOPHILS NFR BLD AUTO: 0 %
BUN SERPL-MCNC: 23 MG/DL (ref 8–23)
CALCIUM SERPL-MCNC: 9.3 MG/DL (ref 8.8–10.2)
CHLORIDE SERPL-SCNC: 104 MMOL/L (ref 98–107)
CREAT SERPL-MCNC: 1.18 MG/DL (ref 0.67–1.17)
CRP SERPL-MCNC: 6.1 MG/L
DEPRECATED HCO3 PLAS-SCNC: 25 MMOL/L (ref 22–29)
EGFRCR SERPLBLD CKD-EPI 2021: 67 ML/MIN/1.73M2
EOSINOPHIL # BLD AUTO: 0 10E3/UL (ref 0–0.7)
EOSINOPHIL NFR BLD AUTO: 0 %
ERYTHROCYTE [DISTWIDTH] IN BLOOD BY AUTOMATED COUNT: 14.6 % (ref 10–15)
ERYTHROCYTE [SEDIMENTATION RATE] IN BLOOD BY WESTERGREN METHOD: 18 MM/HR (ref 0–20)
GLUCOSE SERPL-MCNC: 108 MG/DL (ref 70–99)
HCT VFR BLD AUTO: 36.5 % (ref 40–53)
HGB BLD-MCNC: 12 G/DL (ref 13.3–17.7)
IMM GRANULOCYTES # BLD: 0.2 10E3/UL
IMM GRANULOCYTES NFR BLD: 2 %
LYMPHOCYTES # BLD AUTO: 1.8 10E3/UL (ref 0.8–5.3)
LYMPHOCYTES NFR BLD AUTO: 17 %
MCH RBC QN AUTO: 29.3 PG (ref 26.5–33)
MCHC RBC AUTO-ENTMCNC: 32.9 G/DL (ref 31.5–36.5)
MCV RBC AUTO: 89 FL (ref 78–100)
MONOCYTES # BLD AUTO: 0.8 10E3/UL (ref 0–1.3)
MONOCYTES NFR BLD AUTO: 8 %
NEUTROPHILS # BLD AUTO: 7.7 10E3/UL (ref 1.6–8.3)
NEUTROPHILS NFR BLD AUTO: 73 %
NRBC # BLD AUTO: 0 10E3/UL
NRBC BLD AUTO-RTO: 0 /100
PLATELET # BLD AUTO: 144 10E3/UL (ref 150–450)
POTASSIUM SERPL-SCNC: 3.3 MMOL/L (ref 3.4–5.3)
POTASSIUM SERPL-SCNC: 4.6 MMOL/L (ref 3.4–5.3)
RBC # BLD AUTO: 4.09 10E6/UL (ref 4.4–5.9)
SODIUM SERPL-SCNC: 138 MMOL/L (ref 135–145)
URATE SERPL-MCNC: 4.4 MG/DL (ref 3.4–7)
WBC # BLD AUTO: 10.5 10E3/UL (ref 4–11)

## 2023-11-11 PROCEDURE — 258N000003 HC RX IP 258 OP 636

## 2023-11-11 PROCEDURE — 80048 BASIC METABOLIC PNL TOTAL CA: CPT

## 2023-11-11 PROCEDURE — 36415 COLL VENOUS BLD VENIPUNCTURE: CPT

## 2023-11-11 PROCEDURE — 36415 COLL VENOUS BLD VENIPUNCTURE: CPT | Performed by: EMERGENCY MEDICINE

## 2023-11-11 PROCEDURE — 96365 THER/PROPH/DIAG IV INF INIT: CPT | Mod: XU

## 2023-11-11 PROCEDURE — 93005 ELECTROCARDIOGRAM TRACING: CPT

## 2023-11-11 PROCEDURE — 84550 ASSAY OF BLOOD/URIC ACID: CPT | Performed by: EMERGENCY MEDICINE

## 2023-11-11 PROCEDURE — 84132 ASSAY OF SERUM POTASSIUM: CPT

## 2023-11-11 PROCEDURE — 86140 C-REACTIVE PROTEIN: CPT | Performed by: EMERGENCY MEDICINE

## 2023-11-11 PROCEDURE — G0378 HOSPITAL OBSERVATION PER HR: HCPCS

## 2023-11-11 PROCEDURE — 250N000011 HC RX IP 250 OP 636

## 2023-11-11 PROCEDURE — 73220 MRI UPPR EXTREMITY W/O&W/DYE: CPT | Mod: LT

## 2023-11-11 PROCEDURE — 73130 X-RAY EXAM OF HAND: CPT | Mod: LT

## 2023-11-11 PROCEDURE — 96375 TX/PRO/DX INJ NEW DRUG ADDON: CPT | Mod: XU

## 2023-11-11 PROCEDURE — 250N000012 HC RX MED GY IP 250 OP 636 PS 637

## 2023-11-11 PROCEDURE — 96367 TX/PROPH/DG ADDL SEQ IV INF: CPT

## 2023-11-11 PROCEDURE — 99222 1ST HOSP IP/OBS MODERATE 55: CPT | Performed by: STUDENT IN AN ORGANIZED HEALTH CARE EDUCATION/TRAINING PROGRAM

## 2023-11-11 PROCEDURE — 250N000013 HC RX MED GY IP 250 OP 250 PS 637

## 2023-11-11 PROCEDURE — 250N000011 HC RX IP 250 OP 636: Performed by: EMERGENCY MEDICINE

## 2023-11-11 PROCEDURE — 85025 COMPLETE CBC W/AUTO DIFF WBC: CPT | Performed by: EMERGENCY MEDICINE

## 2023-11-11 PROCEDURE — 255N000002 HC RX 255 OP 636: Mod: JZ | Performed by: EMERGENCY MEDICINE

## 2023-11-11 PROCEDURE — 96376 TX/PRO/DX INJ SAME DRUG ADON: CPT | Mod: XU

## 2023-11-11 PROCEDURE — 85652 RBC SED RATE AUTOMATED: CPT | Performed by: EMERGENCY MEDICINE

## 2023-11-11 PROCEDURE — 99222 1ST HOSP IP/OBS MODERATE 55: CPT | Mod: GC

## 2023-11-11 PROCEDURE — 99285 EMERGENCY DEPT VISIT HI MDM: CPT | Mod: 25

## 2023-11-11 PROCEDURE — 258N000003 HC RX IP 258 OP 636: Performed by: EMERGENCY MEDICINE

## 2023-11-11 PROCEDURE — A9585 GADOBUTROL INJECTION: HCPCS | Mod: JZ | Performed by: EMERGENCY MEDICINE

## 2023-11-11 RX ORDER — GADOBUTROL 604.72 MG/ML
8 INJECTION INTRAVENOUS ONCE
Status: COMPLETED | OUTPATIENT
Start: 2023-11-11 | End: 2023-11-11

## 2023-11-11 RX ORDER — CEFTRIAXONE 1 G/1
1 INJECTION, POWDER, FOR SOLUTION INTRAMUSCULAR; INTRAVENOUS EVERY 24 HOURS
Status: DISCONTINUED | OUTPATIENT
Start: 2023-11-11 | End: 2023-11-12 | Stop reason: HOSPADM

## 2023-11-11 RX ORDER — NALOXONE HYDROCHLORIDE 0.4 MG/ML
0.4 INJECTION, SOLUTION INTRAMUSCULAR; INTRAVENOUS; SUBCUTANEOUS
Status: DISCONTINUED | OUTPATIENT
Start: 2023-11-11 | End: 2023-11-12 | Stop reason: HOSPADM

## 2023-11-11 RX ORDER — LOSARTAN POTASSIUM 50 MG/1
100 TABLET ORAL DAILY
Status: DISCONTINUED | OUTPATIENT
Start: 2023-11-12 | End: 2023-11-12 | Stop reason: HOSPADM

## 2023-11-11 RX ORDER — PYRIDOSTIGMINE BROMIDE 60 MG/1
60 TABLET ORAL 4 TIMES DAILY
Status: DISCONTINUED | OUTPATIENT
Start: 2023-11-11 | End: 2023-11-12 | Stop reason: HOSPADM

## 2023-11-11 RX ORDER — ASPIRIN 81 MG/1
81 TABLET ORAL DAILY
Status: DISCONTINUED | OUTPATIENT
Start: 2023-11-11 | End: 2023-11-12 | Stop reason: HOSPADM

## 2023-11-11 RX ORDER — METHYLPREDNISOLONE SODIUM SUCCINATE 125 MG/2ML
125 INJECTION, POWDER, LYOPHILIZED, FOR SOLUTION INTRAMUSCULAR; INTRAVENOUS ONCE
Status: COMPLETED | OUTPATIENT
Start: 2023-11-11 | End: 2023-11-11

## 2023-11-11 RX ORDER — SODIUM CHLORIDE, SODIUM LACTATE, POTASSIUM CHLORIDE, CALCIUM CHLORIDE 600; 310; 30; 20 MG/100ML; MG/100ML; MG/100ML; MG/100ML
INJECTION, SOLUTION INTRAVENOUS CONTINUOUS
Status: CANCELLED | OUTPATIENT
Start: 2023-11-11

## 2023-11-11 RX ORDER — LIDOCAINE 40 MG/G
CREAM TOPICAL
Status: CANCELLED | OUTPATIENT
Start: 2023-11-11

## 2023-11-11 RX ORDER — AMLODIPINE BESYLATE 5 MG/1
10 TABLET ORAL DAILY
Status: DISCONTINUED | OUTPATIENT
Start: 2023-11-12 | End: 2023-11-12 | Stop reason: HOSPADM

## 2023-11-11 RX ORDER — PREDNISONE 20 MG/1
60 TABLET ORAL EVERY OTHER DAY
Status: DISCONTINUED | OUTPATIENT
Start: 2023-11-13 | End: 2023-11-12 | Stop reason: HOSPADM

## 2023-11-11 RX ORDER — CEFAZOLIN SODIUM 1 G/50ML
750 SOLUTION INTRAVENOUS EVERY 12 HOURS
Status: DISCONTINUED | OUTPATIENT
Start: 2023-11-11 | End: 2023-11-12 | Stop reason: HOSPADM

## 2023-11-11 RX ORDER — PROCHLORPERAZINE MALEATE 5 MG
5 TABLET ORAL EVERY 6 HOURS PRN
Status: DISCONTINUED | OUTPATIENT
Start: 2023-11-11 | End: 2023-11-12 | Stop reason: HOSPADM

## 2023-11-11 RX ORDER — ACETAMINOPHEN 650 MG/1
650 SUPPOSITORY RECTAL EVERY 6 HOURS PRN
Status: DISCONTINUED | OUTPATIENT
Start: 2023-11-11 | End: 2023-11-12 | Stop reason: HOSPADM

## 2023-11-11 RX ORDER — CEFAZOLIN SODIUM 1 G/50ML
2000 SOLUTION INTRAVENOUS ONCE
Status: COMPLETED | OUTPATIENT
Start: 2023-11-11 | End: 2023-11-11

## 2023-11-11 RX ORDER — SODIUM CHLORIDE 9 MG/ML
INJECTION, SOLUTION INTRAVENOUS CONTINUOUS
Status: DISCONTINUED | OUTPATIENT
Start: 2023-11-12 | End: 2023-11-12 | Stop reason: HOSPADM

## 2023-11-11 RX ORDER — LEVOTHYROXINE SODIUM 112 UG/1
112 TABLET ORAL DAILY
Status: DISCONTINUED | OUTPATIENT
Start: 2023-11-12 | End: 2023-11-12 | Stop reason: HOSPADM

## 2023-11-11 RX ORDER — LORAZEPAM 2 MG/ML
0.5 INJECTION INTRAMUSCULAR ONCE
Status: COMPLETED | OUTPATIENT
Start: 2023-11-11 | End: 2023-11-11

## 2023-11-11 RX ORDER — AMOXICILLIN 250 MG
2 CAPSULE ORAL 2 TIMES DAILY PRN
Status: DISCONTINUED | OUTPATIENT
Start: 2023-11-11 | End: 2023-11-12 | Stop reason: HOSPADM

## 2023-11-11 RX ORDER — CEPHALEXIN 500 MG/1
500 CAPSULE ORAL 4 TIMES DAILY
Status: ON HOLD | COMMUNITY
End: 2023-11-12

## 2023-11-11 RX ORDER — PROCHLORPERAZINE 25 MG
12.5 SUPPOSITORY, RECTAL RECTAL EVERY 12 HOURS PRN
Status: DISCONTINUED | OUTPATIENT
Start: 2023-11-11 | End: 2023-11-12 | Stop reason: HOSPADM

## 2023-11-11 RX ORDER — PREDNISONE 20 MG/1
40 TABLET ORAL EVERY OTHER DAY
Status: DISCONTINUED | OUTPATIENT
Start: 2023-11-12 | End: 2023-11-12 | Stop reason: HOSPADM

## 2023-11-11 RX ORDER — MYCOPHENOLATE MOFETIL 250 MG/1
750 CAPSULE ORAL
Status: DISCONTINUED | OUTPATIENT
Start: 2023-11-11 | End: 2023-11-12 | Stop reason: HOSPADM

## 2023-11-11 RX ORDER — ACETAMINOPHEN 325 MG/1
650 TABLET ORAL EVERY 6 HOURS PRN
Status: DISCONTINUED | OUTPATIENT
Start: 2023-11-11 | End: 2023-11-12 | Stop reason: HOSPADM

## 2023-11-11 RX ORDER — NALOXONE HYDROCHLORIDE 0.4 MG/ML
0.2 INJECTION, SOLUTION INTRAMUSCULAR; INTRAVENOUS; SUBCUTANEOUS
Status: DISCONTINUED | OUTPATIENT
Start: 2023-11-11 | End: 2023-11-12 | Stop reason: HOSPADM

## 2023-11-11 RX ORDER — AMOXICILLIN 250 MG
1 CAPSULE ORAL 2 TIMES DAILY PRN
Status: DISCONTINUED | OUTPATIENT
Start: 2023-11-11 | End: 2023-11-12 | Stop reason: HOSPADM

## 2023-11-11 RX ORDER — PREDNISONE 20 MG/1
TABLET ORAL
COMMUNITY
End: 2024-01-10

## 2023-11-11 RX ORDER — ONDANSETRON 4 MG/1
4 TABLET, ORALLY DISINTEGRATING ORAL EVERY 6 HOURS PRN
Status: DISCONTINUED | OUTPATIENT
Start: 2023-11-11 | End: 2023-11-12 | Stop reason: HOSPADM

## 2023-11-11 RX ORDER — ONDANSETRON 2 MG/ML
4 INJECTION INTRAMUSCULAR; INTRAVENOUS EVERY 6 HOURS PRN
Status: DISCONTINUED | OUTPATIENT
Start: 2023-11-11 | End: 2023-11-12 | Stop reason: HOSPADM

## 2023-11-11 RX ADMIN — PYRIDOSTIGMINE BROMIDE 60 MG: 60 TABLET ORAL at 20:10

## 2023-11-11 RX ADMIN — LORAZEPAM 0.5 MG: 2 INJECTION INTRAMUSCULAR; INTRAVENOUS at 12:00

## 2023-11-11 RX ADMIN — VANCOMYCIN HYDROCHLORIDE 2000 MG: 500 INJECTION, POWDER, LYOPHILIZED, FOR SOLUTION INTRAVENOUS at 11:49

## 2023-11-11 RX ADMIN — VANCOMYCIN HYDROCHLORIDE 750 MG: 5 INJECTION, POWDER, LYOPHILIZED, FOR SOLUTION INTRAVENOUS at 21:49

## 2023-11-11 RX ADMIN — GADOBUTROL 8 ML: 604.72 INJECTION INTRAVENOUS at 12:51

## 2023-11-11 RX ADMIN — METHYLPREDNISOLONE SODIUM SUCCINATE 125 MG: 125 INJECTION, POWDER, FOR SOLUTION INTRAMUSCULAR; INTRAVENOUS at 11:49

## 2023-11-11 RX ADMIN — PYRIDOSTIGMINE BROMIDE 60 MG: 60 TABLET ORAL at 16:16

## 2023-11-11 RX ADMIN — MYCOPHENOLATE MOFETIL 750 MG: 250 CAPSULE ORAL at 18:10

## 2023-11-11 RX ADMIN — CEFTRIAXONE SODIUM 1 G: 1 INJECTION, POWDER, FOR SOLUTION INTRAMUSCULAR; INTRAVENOUS at 16:09

## 2023-11-11 RX ADMIN — ACETAMINOPHEN 650 MG: 325 TABLET ORAL at 21:48

## 2023-11-11 RX ADMIN — SODIUM CHLORIDE: 9 INJECTION, SOLUTION INTRAVENOUS at 22:56

## 2023-11-11 ASSESSMENT — ENCOUNTER SYMPTOMS
CHILLS: 0
FEVER: 0
WEAKNESS: 0
NUMBNESS: 0

## 2023-11-11 ASSESSMENT — ACTIVITIES OF DAILY LIVING (ADL)
ADLS_ACUITY_SCORE: 35
ADLS_ACUITY_SCORE: 22
ADLS_ACUITY_SCORE: 22
DEPENDENT_IADLS:: INDEPENDENT
ADLS_ACUITY_SCORE: 35
ADLS_ACUITY_SCORE: 22

## 2023-11-11 NOTE — ED PROVIDER NOTES
EMERGENCY DEPARTMENT ENCOUNTER      NAME: Maurice Biswas  AGE: 68 year old male  YOB: 1955  MRN: [id number]  EVALUATION DATE & TIME: 2023  7:17 AM    PCP: Esa Valdivia    ED PROVIDER: Gucci Bauer DO      Chief Complaint   Patient presents with    Hand Pain         FINAL IMPRESSION:  1. Suppurative tenosynovitis of flexor tendon Acute         ED COURSE & MEDICAL DECISION MAKIN-year-old male presented to the ED for evaluation of ongoing left index finger swelling and pain that have persisted for the last few weeks.  Patient presented today stating that the pain in his finger has worsened and he is now experiencing pain in the left wrist and elbow despite being on antibiotics for 1 week.  Here in the ED the patient was slightly hypertensive upon arrival.  He was otherwise hemodynamically stable.  The patient did not appear to be in any obvious distress or discomfort.  On exam the patient was noted to have a moderately swollen left index finger held in a flexed position.  The palmar surface of the finger was moderately tender to palpation and the patient's pain was reproduced with passive extension of the left index finger.  Exam findings appear concerning for flexor tenosynovitis.    Following his initial evaluation the patient's case was discussed with the on-call orthopedic hand surgeon Dr. Chapin who recommended repeating the x-ray and obtaining basic laboratory test.    CBC, ESR, and uric acid were all reassuring.  The CRP was slightly elevated 6.1.  X-rays of the hand show soft tissue swelling but no other abnormalities.    The patient's case was again discussed with Dr. Chapin.  IV antibiotics, IV steroids, and admission was recommended.    The patient's case was then discussed with the admitting resident physician.  An MRI was ordered for further evaluation.    Pertinent Labs & Imaging studies reviewed. (See chart for details)  7:25 AM I met with the patient to gather history and to  perform my initial exam. We discussed plans for the ED course, including diagnostic testing and treatment.   7:46 AM I paged for [facility].  8:19 AM I spoke with Dr. Chapin, [facility].   8:35 AM I spoke with Dr. Chapin.   10:47 AM I paged for the hospitalist.  11:13 AM I spoke with Dr. Reynoso, [facility] hospitalist, who accepts the patient for admission.          At the conclusion of the encounter I discussed the results of all of the tests and the disposition. The questions were answered. The patient or family acknowledged understanding and was agreeable with the care plan.     Medical Decision Making    History:  Supplemental history from: Documented in chart, if applicable  External Record(s) reviewed: Documented in chart, if applicable. and Prior Imaging: hand xray 11/5/23    Work Up:  Chart documentation includes differential considered and any EKGs or imaging independently interpreted by provider, where specified.  In additional to work up documented, I considered the following work up: Documented in chart, if applicable.    External consultation:  Discussion of management with another provider: Hospitalist and Orthopedics    Complicating factors:  Care impacted by chronic illness: N/A  Care affected by social determinants of health: N/A    Disposition considerations: Admit.      PPE worn: n95 mask, goggles    MEDICATIONS GIVEN IN THE EMERGENCY:  Medications   ceFAZolin (ANCEF) 3 g in sodium chloride 0.9% irrigation (bag) 3,000 mL irrigation (has no administration in time range)   amLODIPine (NORVASC) tablet 10 mg (has no administration in time range)   aspirin EC tablet 81 mg ( Oral Automatically Held 11/14/23 0800)   levothyroxine (SYNTHROID/LEVOTHROID) tablet 112 mcg (has no administration in time range)   losartan (COZAAR) tablet 100 mg (has no administration in time range)   mycophenolate (GENERIC EQUIVALENT) capsule 750 mg (has no administration in time range)   predniSONE  (DELTASONE) tablet 60 mg (has no administration in time range)   pyRIDostigmine (MESTINON) tablet 60 mg (has no administration in time range)   predniSONE (DELTASONE) tablet 40 mg (has no administration in time range)   melatonin tablet 1 mg (has no administration in time range)   acetaminophen (TYLENOL) tablet 650 mg (has no administration in time range)     Or   acetaminophen (TYLENOL) Suppository 650 mg (has no administration in time range)   oxyCODONE IR (ROXICODONE) half-tab 2.5 mg (has no administration in time range)   senna-docusate (SENOKOT-S/PERICOLACE) 8.6-50 MG per tablet 1 tablet (has no administration in time range)     Or   senna-docusate (SENOKOT-S/PERICOLACE) 8.6-50 MG per tablet 2 tablet (has no administration in time range)   ondansetron (ZOFRAN ODT) ODT tab 4 mg (has no administration in time range)     Or   ondansetron (ZOFRAN) injection 4 mg (has no administration in time range)   prochlorperazine (COMPAZINE) injection 5 mg (has no administration in time range)     Or   prochlorperazine (COMPAZINE) tablet 5 mg (has no administration in time range)     Or   prochlorperazine (COMPAZINE) suppository 12.5 mg (has no administration in time range)   sodium chloride 0.9 % infusion (has no administration in time range)   cefTRIAXone (ROCEPHIN) 1 g vial to attach to  mL bag for ADULTS or NS 50 mL bag for PEDS (has no administration in time range)   naloxone (NARCAN) injection 0.2 mg (has no administration in time range)     Or   naloxone (NARCAN) injection 0.4 mg (has no administration in time range)     Or   naloxone (NARCAN) injection 0.2 mg (has no administration in time range)     Or   naloxone (NARCAN) injection 0.4 mg (has no administration in time range)   vancomycin (VANCOCIN) 750 mg in sodium chloride 0.9 % 250 mL intermittent infusion (has no administration in time range)   methylPREDNISolone sodium succinate (solu-MEDROL) injection 125 mg (125 mg Intravenous $Given 11/11/23 1141)    LORazepam (ATIVAN) injection 0.5 mg (0.5 mg Intravenous $Given 11/11/23 1200)   vancomycin (VANCOCIN) 2,000 mg in sodium chloride 0.9 % 500 mL intermittent infusion (0 mg Intravenous Paused 11/11/23 1207)   gadobutrol (GADAVIST) injection 8 mL (8 mLs Intravenous $Given 11/11/23 1251)       NEW PRESCRIPTIONS STARTED AT TODAY'S ER VISIT  New Prescriptions    No medications on file          =================================================================    HPI    Patient information was obtained from: Patient        Maurice Biswas is a 68 year old male with a history of myasthenia gravis, CKD stage 3a, HTN, and anemia, who presents to this ED for evaluation of increasing left index finger and hand pain. Patient notes that he cut his finger after he accidentally hit it with a hammer approximate 6 weeks ago. The initial laceration healed without the need for sutures. However, the patient states that his left his finger has been swollen and painful since that injury. The patient was seen 6 days ago because of the persistent pain and swelling. X-rays were obtained at that time which did not show evidence of a fracture or osteomyelitis. The patient was then started on antibiotics. Patient states that the pain is increasing in the left index finger. He also states that he now has pain located in the left wrist and left elbow starting 1 to 2 days ago. Patient denies any fevers or chills, numbness/tingling/weakness in the left hand, or any other associated symptoms.      REVIEW OF SYSTEMS   Review of Systems   Constitutional:  Negative for chills and fever.   Musculoskeletal:         Positive for left index finger and hand pain.   Neurological:  Negative for weakness and numbness.        Negative for left hand paresthesia.   All other systems reviewed and are negative.       PAST MEDICAL HISTORY:  No past medical history on file.    PAST SURGICAL HISTORY:  Past Surgical History:   Procedure Laterality Date    IR CVC NON  "TUNNEL PLACEMENT > 5 YRS  8/31/2023    IR RENAL BIOPSY LEFT  8/20/2018           CURRENT MEDICATIONS:    amLODIPine (NORVASC) 10 MG tablet  aspirin 81 MG EC tablet  Calcium Carbonate-Vitamin D (OYSTER SHELL CALCIUM/D) 500-5 MG-MCG TABS  cephALEXin (KEFLEX) 500 MG capsule  fish oil-omega-3 fatty acids 1000 MG capsule  fluticasone (FLONASE) 50 mcg/actuation nasal spray  levothyroxine (SYNTHROID/LEVOTHROID) 112 MCG tablet  losartan (COZAAR) 100 MG tablet  mycophenolate (GENERIC EQUIVALENT) 500 MG tablet  predniSONE (DELTASONE) 20 MG tablet  pyRIDostigmine (MESTINON) 60 MG tablet        ALLERGIES:  Allergies   Allergen Reactions    Doxycycline Muscle Pain (Myalgia) and Other (See Comments)     Brought on Myasthenia Gravis       FAMILY HISTORY:  No family history on file.    SOCIAL HISTORY:   Social History     Socioeconomic History    Marital status:    Tobacco Use    Smoking status: Never    Smokeless tobacco: Never   Substance and Sexual Activity    Alcohol use: No    Drug use: No    Sexual activity: Yes     Partners: Female       VITALS:  /72   Pulse 58   Temp 97.8  F (36.6  C) (Tympanic)   Resp 12   Ht 1.778 m (5' 10\")   Wt 83 kg (183 lb)   SpO2 97%   BMI 26.26 kg/m      PHYSICAL EXAM    General presentation: Alert, Vital signs reviewed. No apparent distress.   HENT: ENT inspection is normal. Oropharynx is moist and clear.   Eye: Pupils are equal and reactive to light. EOMI  Neck: The neck is supple.  Pulmonary: Currently in no acute respiratory distress.   Circulatory: Peripheral pulses are strong and equal in the bilateral upper lower extremities.   Neurologic: Alert, oriented to person, place, and time. No gross motor or sensory deficits noted in the upper or lower extremities. Cranial nerves II through XII are grossly intact.  Musculoskeletal: Left index finger is moderately swollen when compared to the right.  Left is finger is moderately tender to palpation.  There is no associated erythema " or warmth.  Left index finger is held in flexion.  Pain is noted with passive extension.  No extremity edema.   Skin: Skin color is normal. No rash. Warm. Dry to touch.          LAB:  All pertinent labs reviewed and interpreted.  Results for orders placed or performed during the hospital encounter of 11/11/23   XR Hand Left G/E 3 Views    Impression    IMPRESSION: No fracture. Mild degenerative changes at the MCP joint of the index finger. Mild circumferential soft tissue swelling around the proximal and middle phalanges of the index finger.     MR Hand Left w/o & w Contrast    Impression    IMPRESSION:  1.  Mild tenosynovitis in the flexor tendons of the index finger, as described without underlying tendinopathy or tearing.    2.  Cellulitis and mild myositis in the index finger.    3.  No discrete fluid collection to suggest an abscess.       Erythrocyte sedimentation rate auto   Result Value Ref Range    Erythrocyte Sedimentation Rate 18 0 - 20 mm/hr   Result Value Ref Range    CRP Inflammation 6.10 (H) <5.00 mg/L   Result Value Ref Range    Uric Acid 4.4 3.4 - 7.0 mg/dL   CBC with platelets and differential   Result Value Ref Range    WBC Count 10.5 4.0 - 11.0 10e3/uL    RBC Count 4.09 (L) 4.40 - 5.90 10e6/uL    Hemoglobin 12.0 (L) 13.3 - 17.7 g/dL    Hematocrit 36.5 (L) 40.0 - 53.0 %    MCV 89 78 - 100 fL    MCH 29.3 26.5 - 33.0 pg    MCHC 32.9 31.5 - 36.5 g/dL    RDW 14.6 10.0 - 15.0 %    Platelet Count 144 (L) 150 - 450 10e3/uL    % Neutrophils 73 %    % Lymphocytes 17 %    % Monocytes 8 %    % Eosinophils 0 %    % Basophils 0 %    % Immature Granulocytes 2 %    NRBCs per 100 WBC 0 <1 /100    Absolute Neutrophils 7.7 1.6 - 8.3 10e3/uL    Absolute Lymphocytes 1.8 0.8 - 5.3 10e3/uL    Absolute Monocytes 0.8 0.0 - 1.3 10e3/uL    Absolute Eosinophils 0.0 0.0 - 0.7 10e3/uL    Absolute Basophils 0.0 0.0 - 0.2 10e3/uL    Absolute Immature Granulocytes 0.2 <=0.4 10e3/uL    Absolute NRBCs 0.0 10e3/uL   Basic metabolic  panel   Result Value Ref Range    Sodium 138 135 - 145 mmol/L    Potassium 3.3 (L) 3.4 - 5.3 mmol/L    Chloride 104 98 - 107 mmol/L    Carbon Dioxide (CO2) 25 22 - 29 mmol/L    Anion Gap 9 7 - 15 mmol/L    Urea Nitrogen 23.0 8.0 - 23.0 mg/dL    Creatinine 1.18 (H) 0.67 - 1.17 mg/dL    GFR Estimate 67 >60 mL/min/1.73m2    Calcium 9.3 8.8 - 10.2 mg/dL    Glucose 108 (H) 70 - 99 mg/dL       RADIOLOGY:  Reviewed all pertinent imaging. Please see official radiology report.  MR Hand Left w/o & w Contrast   Final Result   IMPRESSION:   1.  Mild tenosynovitis in the flexor tendons of the index finger, as described without underlying tendinopathy or tearing.      2.  Cellulitis and mild myositis in the index finger.      3.  No discrete fluid collection to suggest an abscess.            XR Hand Left G/E 3 Views   Final Result   IMPRESSION: No fracture. Mild degenerative changes at the MCP joint of the index finger. Mild circumferential soft tissue swelling around the proximal and middle phalanges of the index finger.               Gucci Bauer DO  Emergency Medicine  [facility] EMERGENCY DEPARTMENT  [employer] [facility] [facility] [id number] [phone]      Gucci Bauer DO  11/11/23 0696

## 2023-11-11 NOTE — ED NOTES
Met with and rounded on patient. Swelling and pain to left 2nd digit on the hand. Cap refill 2 seconds to the distal finger.

## 2023-11-11 NOTE — ED TRIAGE NOTES
Pt injured his left hand  6 weeks ago-he had a cut but it healed itself,but the left index finger got swollen and started hurting.  He started antibiotics last Sunday.  Despite that, pts finger still hurts and is swollen and his left wrist and elbow now hurt. Pain was 8/10 lat night.

## 2023-11-11 NOTE — CONSULTS
[facility]  Neurology Consultation    Patient Name:  Maurice Biswas  MRN:  [id number]    :  1955  Date of Service:  2023  Primary care provider:  Esa Valdivia      Neurology consultation service was asked to see Maurice Biswas by Dr. Vargas to evaluate for myasthenia in setting of need for wound washout.    Chief Complaint: Finger pain    History of Present Illness:   Maurice Biswas is a 68 year old male with history of recently diagnosed myasthenia gravis, finger infection who presents with worsening pain and swelling of his known infected finger.  Neurology was consulted to manage myasthenia symptoms given suspected infection, and possible surgical washout.    On chart review, neurology was first consulted 2023 for worsening of myasthenia.  This was diagnosed 2 years prior at the [facility] clinic, though it seemed that he had been only on prednisone and not a steroid sparing agent.  Symptoms included generalized fatigue, shortness of breath with exertion, diplopia, bilateral ptosis left greater than right, and difficulty swallowing.  He was started on plasma exchange at the end of August, early September and steroids were increased to 60 mg daily.  He was also started on CellCept and pyridostigmine.  Since that time, he describes to me that he has not had any symptoms, except for occasional difficulty with swallowing.  The swallowing difficulty improved after he spaced out his pyridostigmine doses throughout the day (this had been most notable first thing in the morning).      He injured his hand 6 weeks ago/a laceration about the left index finger has since become swollen and painful.  He was started on antibiotics roughly 1 week ago who presents today with worsening of the swelling and pain.  He was started on vancomycin in the ED, and orthopedics is consulted to assess for possible surgical washout.      ROS  A comprehensive ROS was performed and  "pertinent findings were included in HPI.     PMH  No past medical history on file.  Past Surgical History:   Procedure Laterality Date    IR CVC NON TUNNEL PLACEMENT > 5 YRS  8/31/2023    IR RENAL BIOPSY LEFT  8/20/2018       Medications   I have personally reviewed the patient's medication list.     Allergies  I have personally reviewed the patient's allergy list.     Social History  Social History     Socioeconomic History    Marital status:      Spouse name: Not on file    Number of children: Not on file    Years of education: Not on file    Highest education level: Not on file   Occupational History    Not on file   Tobacco Use    Smoking status: Never    Smokeless tobacco: Never   Substance and Sexual Activity    Alcohol use: No    Drug use: No    Sexual activity: Yes     Partners: Female   Other Topics Concern    Not on file   Social History Narrative    Not on file     Social Determinants of Health     Financial Resource Strain: Not on file   Food Insecurity: Not on file   Transportation Needs: Not on file   Physical Activity: Not on file   Stress: Not on file   Social Connections: Not on file   Interpersonal Safety: Not on file   Housing Stability: Not on file      Family History      No family history on file.       Physical Examination   Vitals: /72   Pulse 58   Temp 97.8  F (36.6  C) (Tympanic)   Resp 12   Ht 1.778 m (5' 10\")   Wt 83 kg (183 lb)   SpO2 97%   BMI 26.26 kg/m    General: Lying in bed, NAD  Head: NC/AT  Eyes: no icterus, op pink and moist  Cardiac: RRR. Extremities warm, no edema.   Respiratory: non-labored on RA  GI: S/NT/ND  Skin: No rash or lesion on exposed skin  Psych: Mood pleasant, affect congruent  Neuro:  Mental status: Awake, alert, attentive, oriented to self, time, place, and circumstance. Language is fluent and coherent with intact comprehension of complex commands, naming and repetition.  Cranial nerves:   No diplopia at baseline, cannot induce diplopia with " upward gaze.  There is mild ptosis on the left with sustained upward gaze greater than 30 seconds.  Able to count over 20 in 1 breath  VFF, PERRL, conjugate gaze, EOMI, facial sensation intact, face symmetric, shoulder shrug strong, tongue/uvula midline, no dysarthria.   Motor: Normal bulk and tone. No abnormal movements. 5/5 strength bilaterally in deltoids, biceps, triceps, hand , hip flexors, hip extensors, knee flexion, knee extension, plantarflexion, dorsiflexion.   Reflexes: Normo-reflexic and symmetric biceps, brachioradialis, triceps, patellae, and achilles. Negative Arora, no clonus, toes down-going.  Sensory: Intact to light touch, pin, vibration, and proprioception in proximal and distal aspects of all 4 extremities   Coordination: FNF and HS without ataxia or dysmetria. Rapid alternating movements intact.   Gait: Normal width, stride length, turn, and arm swing. Station normal.     Myasthenia composite score: 1 for bilateral ptosis with sustained upward gaze, 2 for rare episodes of choking, difficulty swallowing: Total of 3    Investigations   I have personally reviewed pertinent labs, tests, and radiological imaging. Discussion of notable findings is included under Impression.     Was patient transferred from outside hospital?   No    Impression  #Myasthenia gravis, stable    Mr. Biswas is a 68-year-old man with myasthenia gravis that has been well controlled on prednisone, mycophenolate, and pyridostigmine for the past 2 months or so.  I suspect that he will be able to stay on this regimen throughout the treatment of this hand infection, and possible washout.  Steroid dose remains relatively high, so I am in favor of aiming to reduce this over time, so that when he follows up with his outpatient neurologist he will ideally be on 40 mg daily rather than 60.  Mycophenolate was started at the beginning of September, and this can take up to 6 months to take effect.    Recommendations  -Continue  pyridostigmine 60 mg 4 times daily as needed  - Continue prednisone 50 mg daily (okay to alternate 60/40 as he is doing as an outpatient)  - Continue mycophenolate 750 twice daily  - Neurology will follow following the procedure to assess for any worsening of symptoms    Thank you for involving Neurology in the care of Maurice Biswas.  Please do not hesitate to call with questions.     Petr Hagan MD    I spent 65 minutes on this consultation including chart review, examination of patient, discussion with hospitalist, review of labs.

## 2023-11-11 NOTE — ED NOTES
Rounded on patient. States pain is 2-3/10 and no need for pain meds currently as this pain is tolerable.

## 2023-11-11 NOTE — PHARMACY-VANCOMYCIN DOSING SERVICE
Pharmacy Vancomycin Initial Note  Date of Service 2023  Patient's  1955  68 year old, male    Indication: Skin and Soft Tissue Infection and tenosynovitis    Current estimated CrCl = Estimated Creatinine Clearance: 69.4 mL/min (A) (based on SCr of 1.18 mg/dL (H)).    Creatinine for last 3 days  2023:  8:54 AM Creatinine 1.18 mg/dL    Recent Vancomycin Level(s) for last 3 days  No results found for requested labs within last 3 days.      Vancomycin IV Administrations (past 72 hours)                     vancomycin (VANCOCIN) 2,000 mg in sodium chloride 0.9 % 500 mL intermittent infusion (mg) 2,000 mg New Bag 23 1149                    Nephrotoxins and other renal medications (From now, onward)      Start     Dose/Rate Route Frequency Ordered Stop    23 2100  vancomycin (VANCOCIN) 750 mg in sodium chloride 0.9 % 250 mL intermittent infusion         750 mg  over 60 Minutes Intravenous EVERY 12 HOURS 23 1439              Contrast Orders - past 72 hours (72h ago, onward)      Start     Dose/Rate Route Frequency Stop    23 1300  gadobutrol (GADAVIST) injection 8 mL         8 mL Intravenous ONCE 23 1251            [employer] Prediction of Planned Initial Vancomycin Regimen    Regimen: 750 mg IV every 12 hours.  Start time: 21:00 on 2023  Exposure target: AUC24 (range)400-600 mg/L.hr   AUC24,ss: 507 mg/L.hr  Probability of AUC24 > 400: 75 %  Ctrough,ss: 17.3 mg/L  Probability of Ctrough,ss > 20: 36 %  Probability of nephrotoxicity (Lodise RADHA ): 13 %          Plan:  Start vancomycin  750 mg IV q12h.   Vancomycin monitoring method: AUC  Vancomycin therapeutic monitoring goal: 400-600 mg*h/L  Pharmacy will check vancomycin levels as appropriate in 1-3 Days.    Serum creatinine levels will be ordered daily for the first week of therapy and at least twice weekly for subsequent weeks.      Tung Zhang [facility]

## 2023-11-11 NOTE — CONSULTS
Care Management Initial Consult    General Information  Assessment completed with: Patient,    Type of CM/SW Visit: Initial Assessment    Primary Care Provider verified and updated as needed: Yes   Readmission within the last 30 days: no previous admission in last 30 days      Reason for Consult: discharge planning  Advance Care Planning: Advance Care Planning Reviewed: no concerns identified          Communication Assessment  Patient's communication style: spoken language (English or Bilingual)             Cognitive  Cognitive/Neuro/Behavioral:                        Living Environment:   People in home: spouse  Naomi  Current living Arrangements: house      Able to return to prior arrangements: yes       Family/Social Support:  Care provided by: self  Provides care for: no one  Marital Status:   Wife, Children  Naomi       Description of Support System: Supportive, Involved    Support Assessment: Patient communicates needs well met, Adequate family and caregiver support, Adequate social supports    Current Resources:   Patient receiving home care services: No     Community Resources: None  Equipment currently used at home:    Supplies currently used at home: None    Employment/Financial:  Employment Status: retired        Financial Concerns:             Does the patient's insurance plan have a 3 day qualifying hospital stay waiver?  No    Lifestyle & Psychosocial Needs:  Social Determinants of Health     Food Insecurity: Not on file   Depression: Not at risk (9/26/2023)    PHQ-2     PHQ-2 Score: 0   Housing Stability: Not on file   Tobacco Use: Low Risk  (9/26/2023)    Patient History     Smoking Tobacco Use: Never     Smokeless Tobacco Use: Never     Passive Exposure: Not on file   Financial Resource Strain: Not on file   Alcohol Use: Not on file   Transportation Needs: Not on file   Physical Activity: Not on file   Interpersonal Safety: Not on file   Stress: Not on file   Social Connections: Not on file        Functional Status:  Prior to admission patient needed assistance:   Dependent ADLs:: Independent, Ambulation-no assistive device  Dependent IADLs:: Independent       Mental Health Status:          Chemical Dependency Status:                Values/Beliefs:  Spiritual, Cultural Beliefs, Holiness Practices, Values that affect care:                 Additional Information:  Lives with spouse in their home, independent with all I/ADLs. Has no services, uses no DME. BANDAR discussed. CM to follow progression. Family to transport.     Caty Friend RN

## 2023-11-11 NOTE — PHARMACY-ADMISSION MEDICATION HISTORY
Pharmacist Admission Medication History    Admission medication history is complete. The information provided in this note is only as accurate as the sources available at the time of the update.    Information Source(s): Patient and CareEverywhere/SureScripts via in-person    Pertinent Information:   Cephalexin: pt started 10 day course on 11/5    Amlodipine: most recent fill is for 5mg, however, pt states his dose was not reduced and he still takes 10mg    Changes made to PTA medication list:  Added: Cephalexin  Deleted: None  Changed: None    Allergies reviewed with patient and updates made in EHR: yes    Medication History Completed By: BALBINA FABIAN [facility] 11/11/2023 10:39 AM    PTA Med List   Medication Sig Last Dose    amLODIPine (NORVASC) 10 MG tablet Take 1 tablet (10 mg) by mouth daily 11/11/2023 at am    aspirin 81 MG EC tablet Take 81 mg by mouth daily Past Week    Calcium Carbonate-Vitamin D (OYSTER SHELL CALCIUM/D) 500-5 MG-MCG TABS Take 1 tablet by mouth 2 times daily (with meals) 11/10/2023 at pm    cephALEXin (KEFLEX) 500 MG capsule Take 500 mg by mouth 4 times daily For 10 days 11/10/2023 at pm    fish oil-omega-3 fatty acids 1000 MG capsule Take 2 capsules by mouth 2 times daily 11/10/2023 at pm    fluticasone (FLONASE) 50 mcg/actuation nasal spray [FLUTICASONE (FLONASE) 50 MCG/ACTUATION NASAL SPRAY] 2 sprays into each nostril daily as needed for rhinitis. Past Month at prn    levothyroxine (SYNTHROID/LEVOTHROID) 112 MCG tablet Take 112 mcg by mouth daily 11/11/2023 at am    losartan (COZAAR) 100 MG tablet Take 1 tablet (100 mg) by mouth daily 11/11/2023 at am    mycophenolate (GENERIC EQUIVALENT) 500 MG tablet Take 1.5 tablets (750 mg) by mouth 2 times daily 11/11/2023 at am    predniSONE (DELTASONE) 20 MG tablet Take 3 tablets (60mg) by mouth on odd days and 2 tablets (40mg) by mouth on even days 11/11/2023 at am    pyRIDostigmine (MESTINON) 60 MG tablet Take 1 tablet (60 mg) by mouth 4 times  daily 11/11/2023 at am

## 2023-11-11 NOTE — H&P
[facility]    History and Physical - Hospitalist Service       Date of Admission:  11/11/2023    Assessment & Plan      Maurice Biswas is a 68 year old male admitted on 11/11/2023. He has a history of myasthenia gravis, hypothyroidism, HTN, CKD3a, prostate cancer s/p radical prostatectomy, and IgA nephropathy and is admitted for tenosynovitis of the left 2nd finger awaiting washout.    L 2nd finger tenosynovitis  L 2nd finger cellulitis  Presenting after smashing his left index finger with a hammer and then scraping it on a nail 5 weeks ago. Treated with Keflex PO starting on 11/5 at urgent care without improvement, now with worsening swelling and pain. Admission CRP 6.10, WBC within normal limits. MRI on admission showed mild tenosynovitis in the flexor tendons of the index finger alongside mild cellulitis and myositis. Last Tdap 12/18/2017 per [facility], however has only gotten two vaccines. Ortho consulted, appreciate recs  - ortho consult, appreciate recs  - irrigation and debridement planned for tomorrow 11/12  - NPO at midnight, regular diet until then  - vanc/ceftri  - mIVF at midnight, PO until then  - vitals  - Tylenol 650mg PRN, oxycodone 2.5mg q4hrs for breakthrough  - consider Tdap and tetanus IG if worsening signs/symptoms      Preop clearance  Patient's risk factors include: HTN.   EKG shows normal sinus rhythm with left axis deviation, no ST-T changes.   Patient's functional capacity is Moderate: 4-7 METS: Cycling, flight of stairs, golf, walking 4mph, yard work   Revised Cardiac Risk Index for Pre-Operative Risk  High risk surgery? No  History of ischemic heart disease? No  History of CHF? No  History of cerebrovascular disease? No  Pre-op treatment w/ insulin? No  Pre-op creatinine > 2.0? No  0 points = class I risk 0.4% risk of major cardiac event    Patient is optimized for surgery at this time    Myasthenia gravis  Follows with [facility]. Recent history of MG  "exacerbation requiring hospitalization 8/30-9/5 with respiratory symptoms, resolved after plasmapheresis and increase in prednisone. CT chest negative for thymoma in outpatient setting. Currently still on high-dose prednisone, which may affect anesthesia outcomes. Neurology consult, appreciate recs.  - neuro consult, appreciate recs for surgery  - PTA prednisone   - 60mg on odd days   - 40mg on even days  - PTA mycophenolate  - PTA pyridostigmine    CKD3  Secondary to IgA nephropathy. Follows with Dr. Mcfarlane with [facility]. Creatinine wnl at this time.  - avoid nephrotoxic agents  - AM BMP    Chronic conditions:  HTN - PTA amlodipine, losartan  Hypothyroidism - PTA levothyroxine          Diet: Combination Diet Regular Diet Adult  NPO per Anesthesia Guidelines for Procedure/Surgery Except for: Meds    DVT Prophylaxis: Pneumatic Compression Devices  Holbrook Catheter: Not present  Fluids: PO, then mIVF when NPO  Lines: PRESENT            Cardiac Monitoring: None  Code Status: Full Code      Clinically Significant Risk Factors Present on Admission        # Hypokalemia: Lowest K = 3.3 mmol/L in last 2 days, will replace as needed         # Drug Induced Platelet Defect: home medication list includes an antiplatelet medication   # Hypertension: Noted on problem list      # Overweight: Estimated body mass index is 26.26 kg/m  as calculated from the following:    Height as of this encounter: 1.778 m (5' 10\").    Weight as of this encounter: 83 kg (183 lb).              Disposition Plan      Expected Discharge Date: 11/12/2023      Destination: home with family          The patient's care was discussed with the Attending Physician, Dr. Vargas .      Cherie Reynoso MD  Hospitalist Service  [facility]  Securely message with [employer] (more info)  Text page via [employer] Paging/Directory   ______________________________________________________________________    Chief Complaint   Left index finger pain    History is " obtained from the patient    History of Present Illness   Maurice Biswas is a 68 year old male presenting for two days of worsening left index finger pain. Five weeks ago, patient was hanging a mirror when he accidentally smashed his finger with a hammer and subsequently scratched his finger on mai nail. Initially saw wound open up, but replaced his skin flap. Generally saw good healing, but when swelling was not resolved after several weeks, presented to urgent care 11/5 for further workup. Received course of Keflex, which he completed. Yesterday, he noticed worsening swelling in his hand and wrist as well as started to feel some discomfort in his left elbow, which prompted him to come in. Has noticed some mild redness, but primary is concerned about degree of swelling in finger and wrist. No systemic fever or chills, no problems with jaw locking up or inability to use arm. Has had difficulty using left index finger for tasks (he is left handed). No worsening swelling or redness of the forearm noted. Notes that his last tetanus shot was in 2017. No other concerns at this time.      Past Medical History    Myasthenia gravis  CKD3a  Hypothyroidism  Prostate cancer s/p radical prostatectomy  HTN    Past Surgical History   Past Surgical History:   Procedure Laterality Date    IR CVC NON TUNNEL PLACEMENT > 5 YRS  8/31/2023    IR RENAL BIOPSY LEFT  8/20/2018       Prior to Admission Medications   Prior to Admission Medications   Prescriptions Last Dose Informant Patient Reported? Taking?   Calcium Carbonate-Vitamin D (OYSTER SHELL CALCIUM/D) 500-5 MG-MCG TABS 11/10/2023 at pm  Yes Yes   Sig: Take 1 tablet by mouth 2 times daily (with meals)   amLODIPine (NORVASC) 10 MG tablet 11/11/2023 at am  No Yes   Sig: Take 1 tablet (10 mg) by mouth daily   aspirin 81 MG EC tablet Past Week  Yes Yes   Sig: Take 81 mg by mouth daily   cephALEXin (KEFLEX) 500 MG capsule 11/10/2023 at pm  Yes Yes   Sig: Take 500 mg by mouth 4 times  daily For 10 days   fish oil-omega-3 fatty acids 1000 MG capsule 11/10/2023 at pm  Yes Yes   Sig: Take 2 capsules by mouth 2 times daily   fluticasone (FLONASE) 50 mcg/actuation nasal spray Past Month at prn  Yes Yes   Sig: [FLUTICASONE (FLONASE) 50 MCG/ACTUATION NASAL SPRAY] 2 sprays into each nostril daily as needed for rhinitis.   levothyroxine (SYNTHROID/LEVOTHROID) 112 MCG tablet 11/11/2023 at am  Yes Yes   Sig: Take 112 mcg by mouth daily   losartan (COZAAR) 100 MG tablet 11/11/2023 at am  No Yes   Sig: Take 1 tablet (100 mg) by mouth daily   mycophenolate (GENERIC EQUIVALENT) 500 MG tablet 11/11/2023 at am  No Yes   Sig: Take 1.5 tablets (750 mg) by mouth 2 times daily   predniSONE (DELTASONE) 20 MG tablet 11/11/2023 at am  Yes Yes   Sig: Take 3 tablets (60mg) by mouth on odd days and 2 tablets (40mg) by mouth on even days   pyRIDostigmine (MESTINON) 60 MG tablet 11/11/2023 at am  No Yes   Sig: Take 1 tablet (60 mg) by mouth 4 times daily      Facility-Administered Medications: None        Review of Systems    The 10 point Review of Systems is negative other than noted in the HPI or here.     Social History   I have reviewed this patient's social history and updated it with pertinent information if needed.  Social History     Tobacco Use    Smoking status: Never    Smokeless tobacco: Never   Substance Use Topics    Alcohol use: No    Drug use: No        Physical Exam   Vital Signs: Temp: 97.8  F (36.6  C) Temp src: Tympanic BP: 124/72 Pulse: 58   Resp: 12 SpO2: 97 %      Weight: 183 lbs 0 oz    General: Alert and oriented x 3, no acute distress  Skin: clean, dry, and intact  Head: normocephalic, atraumatic  Eyes: PERRL, EOMI, no conjunctival injection or icterus. Very mild left sided lid lag.  Ears: canals clear, no discharge  Nose: midline, nares clear  Throat: moist mucous membranes, no lymphadenopathy or JVD  Neck: no masses or lymphadenopathy  Resp: clear to ausculation bilaterally, no rales or  wheezes  Cardio: RRR, S1 and S2 present  Abdomen: soft, nontender, nondistended, bowel sounds present x 4  MSK: Left index finger with severe edema at PIP joint, tender to palpation and mildly discolored. Very limited ROM, sensation intact. Cap refill < 2 sec. Left wrist TTP along ulnar prominence, left elbow mildly TTP along lateral epicondyle. No erythema or edema of wrist, forearm, or elbow.  Neuro: CN II-XII grossly intact.    Medical Decision Making     Please see A&P for additional details of medical decision making.      Data   ------------------------- PAST 24 HR DATA REVIEWED -----------------------------------------------    I have personally reviewed the following data over the past 24 hrs:    10.5  \   12.0 (L)   / 144 (L)     138 104 23.0 /  108 (H)   3.3 (L) 25 1.18 (H) \     Procal: N/A CRP: 6.10 (H) Lactic Acid: N/A         Imaging results reviewed over the past 24 hrs:   Recent Results (from the past 24 hour(s))   XR Hand Left G/E 3 Views    Narrative    EXAM: XR HAND LEFT G/E 3 VIEWS  LOCATION: [facility] [place]  DATE: 11/11/2023    INDICATION: Left index finger pain.  COMPARISON: None.      Impression    IMPRESSION: No fracture. Mild degenerative changes at the MCP joint of the index finger. Mild circumferential soft tissue swelling around the proximal and middle phalanges of the index finger.     MR Hand Left w/o & w Contrast    Narrative    EXAM: MR HAND LEFT WITHOUT AND WITH CONTRAST  LOCATION: [facility] [place]  DATE: 11/11/2023    INDICATION: Pain and swelling in left index finger and hand. Concern for flexor tenosynovitis.  COMPARISON: 11/11/2023 radiographs.  TECHNIQUE: Routine. Additional postgadolinium T1 sequences were obtained.  IV CONTRAST: 8 ml Gadavist.    FINDINGS:     TENDONS:   -Extensor tendons: No tendon tear, tendinopathy, or tenosynovitis.  -Flexor tendons: There is mild tenosynovitis involving the flexor tendons of the index finger  extending from approximately the second metacarpal neck to almost the level of the PIP joint. No underlying tendinopathy or tearing. Remaining flexor tendons   are intact and unremarkable.    LIGAMENTS:  -Visualized collateral and capsular ligaments: Normal.    JOINTS AND BONES:   -No fracture or bone marrow edema. Normal articular cartilage. No joint effusion or synovitis. No evidence of osteomyelitis.    MUSCLES AND SOFT TISSUES:   -[Muscle atrophy. Mild intramuscular T2 signal within the first dorsal interosseous muscle. Moderate circumferential subcutaneous cellulitis in the index finger at the level of the proximal and middle phalanges. No discrete fluid collection to suggest an   abscess. There is also some subcutaneous cellulitis along the dorsal/radial aspect of the hand as seen on series 4 image 18.      Impression    IMPRESSION:  1.  Mild tenosynovitis in the flexor tendons of the index finger, as described without underlying tendinopathy or tearing.    2.  Cellulitis and mild myositis in the index finger.    3.  No discrete fluid collection to suggest an abscess.

## 2023-11-11 NOTE — PHARMACY-VANCOMYCIN DOSING SERVICE
Pharmacy Vancomycin Initial Note  Date of Service 2023  Patient's  1955  68 year old, male    Indication: Bone and Joint Infection    Current estimated CrCl = Estimated Creatinine Clearance: 63 mL/min (based on SCr of 1.3 mg/dL).    Creatinine for last 3 days  No results found for requested labs within last 3 days.    Recent Vancomycin Level(s) for last 3 days  No results found for requested labs within last 3 days.      Vancomycin IV Administrations (past 72 hours)        No vancomycin orders with administrations in past 72 hours.                    Nephrotoxins and other renal medications (From now, onward)      Start     Dose/Rate Route Frequency Ordered Stop    23 1130  vancomycin (VANCOCIN) 2,000 mg in sodium chloride 0.9 % 500 mL intermittent infusion         2,000 mg  over 2 Hours Intravenous ONCE 23 1054              Contrast Orders - past 72 hours (72h ago, onward)      None                Plan:  Start vancomycin 2000 mg IV (25 mg/kg) x1 in ED.   Please reconsult pharmacy if planning on continuing inpatient.    BALBINA FABIAN, [facility]

## 2023-11-12 ENCOUNTER — ANESTHESIA (OUTPATIENT)
Dept: MEDSURG UNIT | Facility: HOSPITAL | Age: 68
End: 2023-11-12
Payer: COMMERCIAL

## 2023-11-12 VITALS
OXYGEN SATURATION: 97 % | DIASTOLIC BLOOD PRESSURE: 78 MMHG | SYSTOLIC BLOOD PRESSURE: 148 MMHG | HEIGHT: 70 IN | TEMPERATURE: 97.8 F | RESPIRATION RATE: 16 BRPM | WEIGHT: 184.2 LBS | HEART RATE: 56 BPM | BODY MASS INDEX: 26.37 KG/M2

## 2023-11-12 LAB
ANION GAP SERPL CALCULATED.3IONS-SCNC: 8 MMOL/L (ref 7–15)
ATRIAL RATE - MUSE: 71 BPM
BUN SERPL-MCNC: 24.4 MG/DL (ref 8–23)
CALCIUM SERPL-MCNC: 9.2 MG/DL (ref 8.8–10.2)
CHLORIDE SERPL-SCNC: 107 MMOL/L (ref 98–107)
CREAT SERPL-MCNC: 0.97 MG/DL (ref 0.67–1.17)
DEPRECATED HCO3 PLAS-SCNC: 25 MMOL/L (ref 22–29)
DIASTOLIC BLOOD PRESSURE - MUSE: 71 MMHG
EGFRCR SERPLBLD CKD-EPI 2021: 85 ML/MIN/1.73M2
ERYTHROCYTE [DISTWIDTH] IN BLOOD BY AUTOMATED COUNT: 14.5 % (ref 10–15)
GLUCOSE SERPL-MCNC: 126 MG/DL (ref 70–99)
HCT VFR BLD AUTO: 35.9 % (ref 40–53)
HGB BLD-MCNC: 11.7 G/DL (ref 13.3–17.7)
INTERPRETATION ECG - MUSE: NORMAL
MCH RBC QN AUTO: 29.1 PG (ref 26.5–33)
MCHC RBC AUTO-ENTMCNC: 32.6 G/DL (ref 31.5–36.5)
MCV RBC AUTO: 89 FL (ref 78–100)
P AXIS - MUSE: 58 DEGREES
PLATELET # BLD AUTO: 150 10E3/UL (ref 150–450)
POTASSIUM SERPL-SCNC: 4.7 MMOL/L (ref 3.4–5.3)
PR INTERVAL - MUSE: 136 MS
QRS DURATION - MUSE: 76 MS
QT - MUSE: 386 MS
QTC - MUSE: 419 MS
R AXIS - MUSE: -44 DEGREES
RBC # BLD AUTO: 4.02 10E6/UL (ref 4.4–5.9)
SODIUM SERPL-SCNC: 140 MMOL/L (ref 135–145)
SYSTOLIC BLOOD PRESSURE - MUSE: 118 MMHG
T AXIS - MUSE: 48 DEGREES
VENTRICULAR RATE- MUSE: 71 BPM
WBC # BLD AUTO: 11.8 10E3/UL (ref 4–11)

## 2023-11-12 PROCEDURE — 250N000009 HC RX 250: Performed by: ORTHOPAEDIC SURGERY

## 2023-11-12 PROCEDURE — 85027 COMPLETE CBC AUTOMATED: CPT

## 2023-11-12 PROCEDURE — 80048 BASIC METABOLIC PNL TOTAL CA: CPT

## 2023-11-12 PROCEDURE — 250N000011 HC RX IP 250 OP 636: Mod: JZ | Performed by: ORTHOPAEDIC SURGERY

## 2023-11-12 PROCEDURE — 36415 COLL VENOUS BLD VENIPUNCTURE: CPT

## 2023-11-12 PROCEDURE — G0378 HOSPITAL OBSERVATION PER HR: HCPCS

## 2023-11-12 PROCEDURE — 250N000013 HC RX MED GY IP 250 OP 250 PS 637

## 2023-11-12 PROCEDURE — 20550 NJX 1 TENDON SHEATH/LIGAMENT: CPT

## 2023-11-12 PROCEDURE — 250N000012 HC RX MED GY IP 250 OP 636 PS 637

## 2023-11-12 RX ORDER — LIDOCAINE HYDROCHLORIDE 10 MG/ML
0.5 INJECTION, SOLUTION INFILTRATION; PERINEURAL ONCE
Status: COMPLETED | OUTPATIENT
Start: 2023-11-12 | End: 2023-11-12

## 2023-11-12 RX ORDER — LIDOCAINE HYDROCHLORIDE 10 MG/ML
1 INJECTION, SOLUTION EPIDURAL; INFILTRATION; INTRACAUDAL; PERINEURAL ONCE
Status: COMPLETED | OUTPATIENT
Start: 2023-11-12 | End: 2023-11-12

## 2023-11-12 RX ORDER — TRIAMCINOLONE ACETONIDE 40 MG/ML
40 INJECTION, SUSPENSION INTRA-ARTICULAR; INTRAMUSCULAR ONCE
Status: COMPLETED | OUTPATIENT
Start: 2023-11-12 | End: 2023-11-12

## 2023-11-12 RX ORDER — TRIAMCINOLONE ACETONIDE 40 MG/ML
20 INJECTION, SUSPENSION INTRA-ARTICULAR; INTRAMUSCULAR ONCE
Status: COMPLETED | OUTPATIENT
Start: 2023-11-12 | End: 2023-11-12

## 2023-11-12 RX ADMIN — PYRIDOSTIGMINE BROMIDE 60 MG: 60 TABLET ORAL at 12:10

## 2023-11-12 RX ADMIN — TRIAMCINOLONE ACETONIDE 20 MG: 40 INJECTION, SUSPENSION INTRA-ARTICULAR; INTRAMUSCULAR at 08:31

## 2023-11-12 RX ADMIN — PYRIDOSTIGMINE BROMIDE 60 MG: 60 TABLET ORAL at 09:30

## 2023-11-12 RX ADMIN — LEVOTHYROXINE SODIUM 112 MCG: 0.11 TABLET ORAL at 08:43

## 2023-11-12 RX ADMIN — AMLODIPINE BESYLATE 10 MG: 5 TABLET ORAL at 09:29

## 2023-11-12 RX ADMIN — LOSARTAN POTASSIUM 100 MG: 50 TABLET, FILM COATED ORAL at 09:30

## 2023-11-12 RX ADMIN — LIDOCAINE HYDROCHLORIDE 0.5 ML: 10 INJECTION, SOLUTION EPIDURAL; INFILTRATION; INTRACAUDAL; PERINEURAL at 08:30

## 2023-11-12 RX ADMIN — MYCOPHENOLATE MOFETIL 750 MG: 250 CAPSULE ORAL at 08:44

## 2023-11-12 RX ADMIN — ACETAMINOPHEN 650 MG: 325 TABLET ORAL at 09:30

## 2023-11-12 RX ADMIN — PREDNISONE 40 MG: 20 TABLET ORAL at 09:30

## 2023-11-12 ASSESSMENT — ACTIVITIES OF DAILY LIVING (ADL)
ADLS_ACUITY_SCORE: 22

## 2023-11-12 NOTE — DISCHARGE SUMMARY
"[facility]  Discharge Summary - Medicine & Pediatrics       Date of Admission:  11/11/2023  Date of Discharge:  11/12/2023 12:20 PM  Discharging Provider: Dr Amor/Dr Vargas   Discharge Service: Hospitalist Service    Discharge Diagnoses   Nonsuppurative stenosing tenosynovitis of the left index finger  Psoriatic arthritis  Myasthenia gravis  CKD 3A  IgA nephropathy  Hypertension  Hyperlipidemia    Clinically Significant Risk Factors     # Overweight: Estimated body mass index is 26.43 kg/m  as calculated from the following:    Height as of this encounter: 1.778 m (5' 10\").    Weight as of this encounter: 83.6 kg (184 lb 3.2 oz).       Follow-ups Needed After Discharge   Follow-up Appointments     Follow-up and recommended labs and tests       Follow up with primary care provider, Esa Valdivia, within 7 days for   hospital follow-up.  We also recommend following up with the orthopedic   surgery team in 1 week.  Please reach out to the orthopedic surgery team   if symptoms are worsening.            Unresulted Labs Ordered in the Past 30 Days of this Admission       No orders found for last 31 day(s).            Discharge Disposition   Discharged to home  Condition at discharge: Stable    Hospital Course   Maurice Biswas is a 68 year old male admitted on 11/11/2023. He has a history of myasthenia gravis, hypothyroidism, HTN, CKD3a, prostate cancer s/p radical prostatectomy, and IgA nephropathy was admitted for tenosynovitis of the left 2nd finger.  Initially thought to be infectious, however after evaluation by orthopedic surgery, suspected complication of trauma in the setting of psoriatic arthritis.     Nonsuppurative stenosing tenosynovitis left index finger  Psoriatic arthritis  Presented after injuring his left index finger with a hammer and then scraping it on a nail 5 weeks ago. Treated with Keflex PO starting on 11/5 at urgent care without improvement, now with worsening swelling and " pain. Admission CRP 6.10, WBC within normal limits. MRI on admission showed mild tenosynovitis in the flexor tendons of the index finger alongside mild cellulitis and myositis.  Initially treated with antibiotics.  Orthopedic surgery evaluated, initially planned for surgical washout, however after further evaluation, elected for Kenalog injection to the A1 pulley of the left index finger.  Thought to be posttraumatic complication with history of psoriatic arthritis.  Low suspicion there was an ongoing infection, so antibiotics were not continued at discharge.  Recommended follow-up with orthopedic surgery in 1 week, or call orthopedic surgical clinic with any worsening of symptoms.      Myasthenia gravis  Follows with [facility]. Recent history of MG exacerbation requiring hospitalization 8/30-9/5 with respiratory symptoms, resolved after plasmapheresis and increase in prednisone. CT chest negative for thymoma in outpatient setting.  Continued home regimen while hospitalized, and at discharge.     CKD 3a  Secondary to IgA nephropathy. Follows with Dr. Mcfarlane with [facility]. Creatinine within normal limits on admission      Chronic conditions:  HTN - Continued PTA amlodipine, losartan  Hypothyroidism -  Continued PTA levothyroxine      Consultations This Hospital Stay   PHARMACY TO DOSE VANCO  NEUROLOGY IP CONSULT  PHARMACY TO DOSE VANCO  ORTHOPEDIC SURGERY IP CONSULT  CARE MANAGEMENT / SOCIAL WORK IP CONSULT    Code Status   Prior       The patient was discussed with Dr. Sam Amor MD  [facility] [address] [place] [address]  Phone: [phone]  Fax: [phone]  ______________________________________________________________________    Physical Exam   Vital Signs: Temp: 97.8  F (36.6  C) Temp src: Oral BP: (!) 148/78 Pulse: 56   Resp: 16 SpO2: 97 % O2 Device: None (Room air)    Weight: 184 lbs 3.2 oz  General  Appearance: Comfortable.  No acute distress.  Respiratory: Comfortable respiratory effort.  No crackles or wheezing.  Cardiovascular: Regular rate rhythm.  No digital cyanosis  GI: Nondistended.  Bowel sounds present.  Soft nontender.  Skin: Warm and well perfused.  Left hand: Diffuse swelling of the second finger.  In the extended position.  Decreased range of motion.  Able to passively flex at the MP, PIP, and DIP joints. Active flexion limited by pain.  No overlying redness.  Diffuse pain with palpation of the second finger      Primary Care Physician   Esa Valdivia    Discharge Orders      Reason for your hospital stay    You were admitted for worsening pain and swelling in your left hand.  We initially thought this was an infection, however after orthopedic surgery saw you, they suspected it was less likely an infection.  They gave you a steroid injection to the area to help with the pain and swelling.  We are not continuing any antibiotics at discharge.     Follow-up and recommended labs and tests     Follow up with primary care provider, Esa Valdivia, within 7 days for hospital follow-up.  We also recommend following up with the orthopedic surgery team in 1 week.  Please reach out to the orthopedic surgery team if symptoms are worsening.     Activity    Your activity upon discharge: activity as tolerated     Diet    Follow this diet upon discharge: Orders Placed This Encounter      Regular Diet Adult       Significant Results and Procedures   Most Recent 3 CBC's:  Recent Labs   Lab Test 11/12/23  0604 11/11/23  0854 09/05/23  0655 09/04/23  0752   WBC 11.8* 10.5  --  20.8*   HGB 11.7* 12.0*  --  16.3   MCV 89 89  --  88    144* 152 173     Most Recent 3 BMP's:  Recent Labs   Lab Test 11/12/23  0604 11/11/23  2034 11/11/23  0854 10/30/23  1354 09/05/23  1350 09/05/23  0802 09/05/23  0655     --  138  --   --   --  136   POTASSIUM 4.7 4.6 3.3*  --   --   --  4.1   CHLORIDE 107  --  104  --   --    --  98   CO2 25  --  25  --   --   --  28   BUN 24.4*  --  23.0  --   --   --  31.0*   CR 0.97  --  1.18* 1.3  --   --  1.46*   ANIONGAP 8  --  9  --   --   --  10   SARAH 9.2  --  9.3  --   --   --  9.3   *  --  108*  --  139*   < > 89    < > = values in this interval not displayed.   ,   Results for orders placed or performed during the hospital encounter of 11/11/23   XR Hand Left G/E 3 Views    Narrative    EXAM: XR HAND LEFT G/E 3 VIEWS  LOCATION: [facility]  DATE: 11/11/2023    INDICATION: Left index finger pain.  COMPARISON: None.      Impression    IMPRESSION: No fracture. Mild degenerative changes at the MCP joint of the index finger. Mild circumferential soft tissue swelling around the proximal and middle phalanges of the index finger.     MR Hand Left w/o & w Contrast    Narrative    EXAM: MR HAND LEFT WITHOUT AND WITH CONTRAST  LOCATION: [facility]  DATE: 11/11/2023    INDICATION: Pain and swelling in left index finger and hand. Concern for flexor tenosynovitis.  COMPARISON: 11/11/2023 radiographs.  TECHNIQUE: Routine. Additional postgadolinium T1 sequences were obtained.  IV CONTRAST: 8 ml Gadavist.    FINDINGS:     TENDONS:   -Extensor tendons: No tendon tear, tendinopathy, or tenosynovitis.  -Flexor tendons: There is mild tenosynovitis involving the flexor tendons of the index finger extending from approximately the second metacarpal neck to almost the level of the PIP joint. No underlying tendinopathy or tearing. Remaining flexor tendons   are intact and unremarkable.    LIGAMENTS:  -Visualized collateral and capsular ligaments: Normal.    JOINTS AND BONES:   -No fracture or bone marrow edema. Normal articular cartilage. No joint effusion or synovitis. No evidence of osteomyelitis.    MUSCLES AND SOFT TISSUES:   -[Muscle atrophy. Mild intramuscular T2 signal within the first dorsal interosseous muscle. Moderate circumferential subcutaneous  cellulitis in the index finger at the level of the proximal and middle phalanges. No discrete fluid collection to suggest an   abscess. There is also some subcutaneous cellulitis along the dorsal/radial aspect of the hand as seen on series 4 image 18.      Impression    IMPRESSION:  1.  Mild tenosynovitis in the flexor tendons of the index finger, as described without underlying tendinopathy or tearing.    2.  Cellulitis and mild myositis in the index finger.    3.  No discrete fluid collection to suggest an abscess.           Discharge Medications   Discharge Medication List as of 11/12/2023 11:36 AM        CONTINUE these medications which have NOT CHANGED    Details   amLODIPine (NORVASC) 10 MG tablet Take 1 tablet (10 mg) by mouth daily, Disp-30 tablet, R-0, E-Prescribe      aspirin 81 MG EC tablet Take 81 mg by mouth daily, Historical      Calcium Carbonate-Vitamin D (OYSTER SHELL CALCIUM/D) 500-5 MG-MCG TABS Take 1 tablet by mouth 2 times daily (with meals), Historical      fish oil-omega-3 fatty acids 1000 MG capsule Take 2 capsules by mouth 2 times daily, Historical      fluticasone (FLONASE) 50 mcg/actuation nasal spray [FLUTICASONE (FLONASE) 50 MCG/ACTUATION NASAL SPRAY] 2 sprays into each nostril daily as needed for rhinitis., Historical      levothyroxine (SYNTHROID/LEVOTHROID) 112 MCG tablet Take 112 mcg by mouth daily, Historical      losartan (COZAAR) 100 MG tablet Take 1 tablet (100 mg) by mouth daily, No Print Out      mycophenolate (GENERIC EQUIVALENT) 500 MG tablet Take 1.5 tablets (750 mg) by mouth 2 times daily, Disp-90 tablet, R-11, E-Prescribe      predniSONE (DELTASONE) 20 MG tablet Take 3 tablets (60mg) by mouth on odd days and 2 tablets (40mg) by mouth on even days, Historical      pyRIDostigmine (MESTINON) 60 MG tablet Take 1 tablet (60 mg) by mouth 4 times daily, Disp-120 tablet, R-11, E-Prescribe           STOP taking these medications       cephALEXin (KEFLEX) 500 MG capsule Comments:    Reason for Stopping:             Allergies   Allergies   Allergen Reactions    Doxycycline Muscle Pain (Myalgia) and Other (See Comments)     Brought on Myasthenia Gravis

## 2023-11-12 NOTE — PLAN OF CARE
Problem: Skin or Soft Tissue Infection  Goal: Absence of Infection Signs and Symptoms  Outcome: Progressing   PRIMARY DIAGNOSIS: SOFT TISSUE INFECTIONS  OUTPATIENT/OBSERVATION GOALS TO BE MET BEFORE DISCHARGE:  Vitals sign stable or return to baseline: Yes    Tolerating oral antibiotics or has home infusion set up if applicable:  started today on IV antibiotics.     Pain status: Minimal pain, declines prn pain medications.    Return to near baseline physical activity: Yes    Discharge Planner Nurse   Safe discharge environment identified: Yes  Barriers to discharge: Yes. Plan for Irrigation and debridement tomorrow.          Entered by: Norah Smith RN 11/11/2023 6:57 PM     Please review provider order for any additional goals.     Nurse to notify provider when observation goals have been met and patient is ready for discharge.Goal Outcome Evaluation:

## 2023-11-12 NOTE — PLAN OF CARE
Problem: Skin or Soft Tissue Infection  Goal: Absence of Infection Signs and Symptoms  Outcome: Progressing   PRIMARY DIAGNOSIS: SOFT TISSUE INFECTIONS  OUTPATIENT/OBSERVATION GOALS TO BE MET BEFORE DISCHARGE:  Vitals sign stable or return to baseline: Yes    Tolerating oral antibiotics or has home infusion set up if applicable:  antibiotics discontinued. Steroid injection per Ortho surgeon at bedside this morning.     Pain status: Improved-controlled with oral pain medications.    Return to near baseline physical activity: Yes    Discharge Planner Nurse   Safe discharge environment identified: Yes  Barriers to discharge: No. Orders to discharge this morning.          Entered by: Norah Smith RN 11/12/2023 11:08 AM     Please review provider order for any additional goals.     Nurse to notify provider when observation goals have been met and patient is ready for discharge.Goal Outcome Evaluation:

## 2023-11-12 NOTE — CONSULTS
[facility] Orthopedic Consultation    Maurice Biswas MRN# [id number]   Age: 68 year old YOB: 1955     Date of Admission:  11/11/2023    Reason for consult: Left index finger pain and swelling       Requesting physician: Dr. Cherie Reynoso       Level of consult: Consult, follow and place orders           Assessment and Plan:   Assessment:   Nonsuppurative, stenosing flexor tenosynovitis left index finger  Posttraumatic swelling, left index finger.  Psoriatic arthritis      Plan:   Based on the patient's history and clinical exam, I believe the patient's 6-week history of left index finger swelling is posttraumatic complicated by his history of psoriatic arthritis.  More recently he has developed a flexor tenosynovitis.  There was concern that this might be supportive.  However, based on his history and clinical exam, and the fact that it is improved after administration of steroid, I believe this is nonsupportive.  Rather, this seems more like a stenosing flexor tenosynovitis.  I have recommended doing a cortisone injection into and around the A1 pulley to see if this further improves his symptoms.  The risks, benefits, and alternatives of doing a cortisone injection were fully discussed with the patient.  The patient had opportunity ask questions which I answered to the best my ability.  He verbalized understanding and stated he wished to proceed with the cortisone injection.    After verbal informed consent was obtained, the skin over the left index finger A1 pulley was prepped with rubbing alcohol.  A 25-gauge needle was then used to inject 0.5cc of 40 mg/cc of Kenalog and 0.5 cc of 1% lidocaine.  After the injection, the site was covered with a Band-Aid.    From the orthopedic hand perspective, the patient can be discharged with close follow-up.  The patient should follow-up in my clinic in 1 week for reassessment.  The patient was instructed to call my clinic or return to  the emergency department if his symptoms worsen upon discharge.            Chief Complaint:   Left index finger swelling and pain       History is obtained from the patient         History of Present Illness:       This patient is a 68 year old male who presented to the emergency on 11/11/2023 complaining of ongoing swelling and worsening pain of his left index finger.  The patient reports that approximately 6 weeks ago he attempted to hit a nail with a hammer but missed to striking his finger instead.  He sustained a laceration over the dorsum of his left index finger.  He also had swelling of the left index finger.  The laceration healed but he had ongoing swelling of the index finger.  He was seen on 11/5/2023 at an urgent care clinic.  There was some concern for infection and he was started on Keflex.  He was instructed to follow-up with a hand surgeon but he did not.  He developed some pain at the base of his left index finger that radiated proximally.  He then presented to the emergency room.  The patient has a history of psoriatic arthritis.           Past Medical History:   I have reviewed this patient's past medical history  No past medical history on file.          Past Surgical History:   I have reviewed this patient's past surgical history  Past Surgical History:   Procedure Laterality Date    IR CVC NON TUNNEL PLACEMENT > 5 YRS  8/31/2023    IR RENAL BIOPSY LEFT  8/20/2018             Social History:   I have reviewed this patient's social history  Social History     Tobacco Use    Smoking status: Never    Smokeless tobacco: Never   Substance Use Topics    Alcohol use: No             Family History:   This patient has no significant family history          Immunizations:     Immunization History   Administered Date(s) Administered    COVID-19 Bivalent 12+ ([employer]) 11/09/2022    COVID-19 MONOVALENT 12+ (Pf[employer]) 11/03/2021    COVID-19 Vaccine ([employer] ([place] 03/09/2021             Allergies:   All allergies  reviewed and addressed  Allergies   Allergen Reactions    Doxycycline Muscle Pain (Myalgia) and Other (See Comments)     Brought on Myasthenia Gravis             Medications:     Medications Prior to Admission   Medication Sig Dispense Refill Last Dose    amLODIPine (NORVASC) 10 MG tablet Take 1 tablet (10 mg) by mouth daily 30 tablet 0 11/11/2023 at am    aspirin 81 MG EC tablet Take 81 mg by mouth daily   Past Week    Calcium Carbonate-Vitamin D (OYSTER SHELL CALCIUM/D) 500-5 MG-MCG TABS Take 1 tablet by mouth 2 times daily (with meals)   11/10/2023 at pm    cephALEXin (KEFLEX) 500 MG capsule Take 500 mg by mouth 4 times daily For 10 days   11/10/2023 at pm    fish oil-omega-3 fatty acids 1000 MG capsule Take 2 capsules by mouth 2 times daily   11/10/2023 at pm    fluticasone (FLONASE) 50 mcg/actuation nasal spray [FLUTICASONE (FLONASE) 50 MCG/ACTUATION NASAL SPRAY] 2 sprays into each nostril daily as needed for rhinitis.   Past Month at prn    levothyroxine (SYNTHROID/LEVOTHROID) 112 MCG tablet Take 112 mcg by mouth daily   11/11/2023 at am    losartan (COZAAR) 100 MG tablet Take 1 tablet (100 mg) by mouth daily   11/11/2023 at am    mycophenolate (GENERIC EQUIVALENT) 500 MG tablet Take 1.5 tablets (750 mg) by mouth 2 times daily 90 tablet 11 11/11/2023 at am    predniSONE (DELTASONE) 20 MG tablet Take 3 tablets (60mg) by mouth on odd days and 2 tablets (40mg) by mouth on even days   11/11/2023 at am    pyRIDostigmine (MESTINON) 60 MG tablet Take 1 tablet (60 mg) by mouth 4 times daily 120 tablet 11 11/11/2023 at am             Review of Systems:   The Review of Systems is negative other than noted in the HPI          Physical Exam:   Vitals were reviewed  Temp: 97.8  F (36.6  C) Temp src: Oral BP: (!) 148/78 Pulse: 54   Resp: 16 SpO2: 97 % O2 Device: None (Room air)    All vitals have been reviewed  GENERAL: Mature adult male in no acute distress, sitting comfortably in bed.  PSYCH: The patient is alert awake  and oriented to person place and time.  NEURO: The patient has normal sensation with respect to light touch in the ulnar, median, and radial nerve distribution of his left hand.  He has normal sensation with respect to light touch along the ulnar and radial border of his left index finger  LEFT HAND:  -There is moderate swelling of the left index finger.  -The finger is not held in a flexed position.  -The patient has no pain with passive extension of the left index finger.  -There is no significant erythema or warmth of the left index finger.  -The patient has mild tenderness with palpation over the PIP and MP joints of the left index finger.  -The patient has moderate tenderness with palpation over the A1 pulley of the left index finger.  -The patient has no tenderness with palpation over the flexor tendon sheath distal to the A1 pulley.  -The patient is able to flex and extend his MP, PIP, and DIP joints but has diminished range of motion.  -LEFT INDEX FINGER RANGE OF MOTION: MPJ is 0-50, PIPJ is 0-40, DIPJ is 0-30.          Data:   All laboratory data reviewed  Results for orders placed or performed during the hospital encounter of 11/11/23 (from the past 24 hour(s))   Erythrocyte sedimentation rate auto   Result Value Ref Range    Erythrocyte Sedimentation Rate 18 0 - 20 mm/hr   CBC with platelets + differential    Narrative    The following orders were created for panel order CBC with platelets + differential.  Procedure                               Abnormality         Status                     ---------                               -----------         ------                     CBC with platelets and d...[[id number]]  Abnormal            Final result                 Please view results for these tests on the individual orders.   CRP inflammation   Result Value Ref Range    CRP Inflammation 6.10 (H) <5.00 mg/L   Uric acid   Result Value Ref Range    Uric Acid 4.4 3.4 - 7.0 mg/dL   CBC with platelets and  differential   Result Value Ref Range    WBC Count 10.5 4.0 - 11.0 10e3/uL    RBC Count 4.09 (L) 4.40 - 5.90 10e6/uL    Hemoglobin 12.0 (L) 13.3 - 17.7 g/dL    Hematocrit 36.5 (L) 40.0 - 53.0 %    MCV 89 78 - 100 fL    MCH 29.3 26.5 - 33.0 pg    MCHC 32.9 31.5 - 36.5 g/dL    RDW 14.6 10.0 - 15.0 %    Platelet Count 144 (L) 150 - 450 10e3/uL    % Neutrophils 73 %    % Lymphocytes 17 %    % Monocytes 8 %    % Eosinophils 0 %    % Basophils 0 %    % Immature Granulocytes 2 %    NRBCs per 100 WBC 0 <1 /100    Absolute Neutrophils 7.7 1.6 - 8.3 10e3/uL    Absolute Lymphocytes 1.8 0.8 - 5.3 10e3/uL    Absolute Monocytes 0.8 0.0 - 1.3 10e3/uL    Absolute Eosinophils 0.0 0.0 - 0.7 10e3/uL    Absolute Basophils 0.0 0.0 - 0.2 10e3/uL    Absolute Immature Granulocytes 0.2 <=0.4 10e3/uL    Absolute NRBCs 0.0 10e3/uL   Basic metabolic panel   Result Value Ref Range    Sodium 138 135 - 145 mmol/L    Potassium 3.3 (L) 3.4 - 5.3 mmol/L    Chloride 104 98 - 107 mmol/L    Carbon Dioxide (CO2) 25 22 - 29 mmol/L    Anion Gap 9 7 - 15 mmol/L    Urea Nitrogen 23.0 8.0 - 23.0 mg/dL    Creatinine 1.18 (H) 0.67 - 1.17 mg/dL    GFR Estimate 67 >60 mL/min/1.73m2    Calcium 9.3 8.8 - 10.2 mg/dL    Glucose 108 (H) 70 - 99 mg/dL   XR Hand Left G/E 3 Views    Narrative    EXAM: XR HAND LEFT G/E 3 VIEWS  LOCATION: [facility]  DATE: 11/11/2023    INDICATION: Left index finger pain.  COMPARISON: None.      Impression    IMPRESSION: No fracture. Mild degenerative changes at the MCP joint of the index finger. Mild circumferential soft tissue swelling around the proximal and middle phalanges of the index finger.     MR Hand Left w/o & w Contrast    Narrative    EXAM: MR HAND LEFT WITHOUT AND WITH CONTRAST  LOCATION: [facility]  DATE: 11/11/2023    INDICATION: Pain and swelling in left index finger and hand. Concern for flexor tenosynovitis.  COMPARISON: 11/11/2023 radiographs.  TECHNIQUE: Routine.  Additional postgadolinium T1 sequences were obtained.  IV CONTRAST: 8 ml Gadavist.    FINDINGS:     TENDONS:   -Extensor tendons: No tendon tear, tendinopathy, or tenosynovitis.  -Flexor tendons: There is mild tenosynovitis involving the flexor tendons of the index finger extending from approximately the second metacarpal neck to almost the level of the PIP joint. No underlying tendinopathy or tearing. Remaining flexor tendons   are intact and unremarkable.    LIGAMENTS:  -Visualized collateral and capsular ligaments: Normal.    JOINTS AND BONES:   -No fracture or bone marrow edema. Normal articular cartilage. No joint effusion or synovitis. No evidence of osteomyelitis.    MUSCLES AND SOFT TISSUES:   -[Muscle atrophy. Mild intramuscular T2 signal within the first dorsal interosseous muscle. Moderate circumferential subcutaneous cellulitis in the index finger at the level of the proximal and middle phalanges. No discrete fluid collection to suggest an   abscess. There is also some subcutaneous cellulitis along the dorsal/radial aspect of the hand as seen on series 4 image 18.      Impression    IMPRESSION:  1.  Mild tenosynovitis in the flexor tendons of the index finger, as described without underlying tendinopathy or tearing.    2.  Cellulitis and mild myositis in the index finger.    3.  No discrete fluid collection to suggest an abscess.       ECG 12-LEAD WITH MUSE (LHE)   Result Value Ref Range    Systolic Blood Pressure 118 mmHg    Diastolic Blood Pressure 71 mmHg    Ventricular Rate 71 BPM    Atrial Rate 71 BPM    CT Interval 136 ms    QRS Duration 76 ms     ms    QTc 419 ms    P Axis 58 degrees    R AXIS -44 degrees    T Axis 48 degrees    Interpretation ECG       Sinus rhythm  Left axis deviation  Low voltage QRS  Cannot rule out Anterior infarct , age undetermined  Abnormal ECG  When compared with ECG of 16-JAN-2023 08:58,  No significant change was found  Confirmed by SEE ED PROVIDER NOTE FOR, ECG  INTERPRETATION ([id number]),  EDNA OWENS ([id number]) on 11/12/2023 3:06:49 AM     Potassium   Result Value Ref Range    Potassium 4.6 3.4 - 5.3 mmol/L   Basic metabolic panel   Result Value Ref Range    Sodium 140 135 - 145 mmol/L    Potassium 4.7 3.4 - 5.3 mmol/L    Chloride 107 98 - 107 mmol/L    Carbon Dioxide (CO2) 25 22 - 29 mmol/L    Anion Gap 8 7 - 15 mmol/L    Urea Nitrogen 24.4 (H) 8.0 - 23.0 mg/dL    Creatinine 0.97 0.67 - 1.17 mg/dL    GFR Estimate 85 >60 mL/min/1.73m2    Calcium 9.2 8.8 - 10.2 mg/dL    Glucose 126 (H) 70 - 99 mg/dL   CBC with platelets   Result Value Ref Range    WBC Count 11.8 (H) 4.0 - 11.0 10e3/uL    RBC Count 4.02 (L) 4.40 - 5.90 10e6/uL    Hemoglobin 11.7 (L) 13.3 - 17.7 g/dL    Hematocrit 35.9 (L) 40.0 - 53.0 %    MCV 89 78 - 100 fL    MCH 29.1 26.5 - 33.0 pg    MCHC 32.6 31.5 - 36.5 g/dL    RDW 14.5 10.0 - 15.0 %    Platelet Count 150 150 - 450 10e3/uL        Attestation:  I have reviewed today's vital signs, notes, medications, labs and imaging.    Jennifer Chapin MD

## 2023-11-12 NOTE — PLAN OF CARE
PRIMARY DIAGNOSIS: SOFT TISSUE INFECTIONS  OUTPATIENT/OBSERVATION GOALS TO BE MET BEFORE DISCHARGE:  Vitals sign stable or return to baseline: Yes    Tolerating oral antibiotics or has home infusion set up if applicable: No    Pain status: Pain free.    Return to near baseline physical activity: Yes    Discharge Planner Nurse   Safe discharge environment identified: Yes  Barriers to discharge: Yes       Entered by: Julianna Edward RN 11/12/2023 8:00 AM     Please review provider order for any additional goals.     Nurse to notify provider when observation goals have been met and patient is ready for discharge.Goal Outcome Evaluation:      Plan of Care Reviewed With: patient

## 2023-11-12 NOTE — PROVIDER NOTIFICATION
Provider Notification:    Reason for Communication:  K 3.3. Would you like replacement?    Team Member Name & Role:  Dr Jang (Phalen)    Method of Communication:  Page/call    Response:  MD states she will order K replacement protocol. Will check a K level now and run protocol based on that result, per MD advisement.

## 2023-11-12 NOTE — PLAN OF CARE
PRIMARY DIAGNOSIS: SOFT TISSUE INFECTIONS  OUTPATIENT/OBSERVATION GOALS TO BE MET BEFORE DISCHARGE:  Vitals sign stable or return to baseline: Yes    Tolerating oral antibiotics or has home infusion set up if applicable: No    Pain status: Improved-controlled with oral pain medications.    Return to near baseline physical activity: Yes    Discharge Planner Nurse   Safe discharge environment identified: Yes  Barriers to discharge: Yes       Entered by: Julianna Edward RN 11/12/2023 12:47 AM     Please review provider order for any additional goals.     Nurse to notify provider when observation goals have been met and patient is ready for discharge.Goal Outcome Evaluation:      Plan of Care Reviewed With: patient

## 2023-11-12 NOTE — SIGNIFICANT EVENT
Orthopedic surgeon saw patient at bedside at 0700 and cancelled the surgery. MD plans to do a steroid injection at the bedside instead. Medications ordered.

## 2023-11-12 NOTE — ANESTHESIA POSTPROCEDURE EVALUATION
Patient: Maurice Biswas    Procedure: Procedure(s):  IRRIGATION AND DEBRIDEMENT, LEFT INDEX FINGER       Anesthesia Type:  No value filed.    Note:  Disposition: Outpatient   Postop Pain Control:    PONV: No   Neuro/Psych: Uneventful            Sign Out: Acceptable/Baseline neuro status   Airway/Respiratory: Uneventful            Sign Out: Acceptable/Baseline resp. status   CV/Hemodynamics: Uneventful            Sign Out: Acceptable CV status; No obvious hypovolemia; No obvious fluid overload   Other NRE:    DID A NON-ROUTINE EVENT OCCUR?            Last vitals:  Vitals:    11/11/23 1650 11/11/23 2001 11/11/23 2313   BP: (!) 154/80 132/63 (!) 149/75   Pulse: 78 63 68   Resp: 18 18 18   Temp: 36.4  C (97.6  F) 36.8  C (98.2  F) 36.9  C (98.4  F)   SpO2: 98% 94% 96%       Electronically Signed By: Pratik Patrick MD  November 12, 2023  2:13 AM

## 2023-11-12 NOTE — PROGRESS NOTES
Care Management Follow Up    Length of Stay (days): 0    Expected Discharge Date: 11/12/2023     Concerns to be Addressed: follow medical progression for any discharge needs     Patient plan of care discussed at interdisciplinary rounds: Yes    Anticipated Discharge Disposition:  return home     Anticipated Discharge Services:  unknown at this time - ortho following  Anticipated Discharge DME:  none anticipated    Patient/family educated on Medicare website which has current facility and service quality ratings:  n/a  Education Provided on the Discharge Plan:    Patient/Family in Agreement with the Plan:      Referrals Placed by CM/SW:  none  Private pay costs discussed: Not applicable    Additional Information:  Patient was scheduled to have surgery today but that was cancelled and instead patient will have a steroid injection at bedside.     Patient lives with spouse in their home, independent with all I/ADLs. Has no services, uses no DME. [facility] discussed. CM to follow progression. Family to transport.      ESVIN BlissSW

## 2023-11-12 NOTE — PLAN OF CARE
PRIMARY DIAGNOSIS: SOFT TISSUE INFECTIONS  OUTPATIENT/OBSERVATION GOALS TO BE MET BEFORE DISCHARGE:  Vitals sign stable or return to baseline: Yes    Tolerating oral antibiotics or has home infusion set up if applicable: No    Pain status: Improved-controlled with oral pain medications.    Return to near baseline physical activity: Yes    Discharge Planner Nurse   Safe discharge environment identified: Yes  Barriers to discharge: Yes       Entered by: Julianna Edward RN 11/11/2023 8:35 PM     Please review provider order for any additional goals.     Nurse to notify provider when observation goals have been met and patient is ready for discharge.Goal Outcome Evaluation:      Plan of Care Reviewed With: patient    Plan for I&D tomorrow.

## 2023-12-23 ENCOUNTER — HEALTH MAINTENANCE LETTER (OUTPATIENT)
Age: 68
End: 2023-12-23

## 2024-01-02 ENCOUNTER — TRANSFERRED RECORDS (OUTPATIENT)
Dept: HEALTH INFORMATION MANAGEMENT | Facility: CLINIC | Age: 69
End: 2024-01-02
Payer: COMMERCIAL

## 2024-01-09 NOTE — PROGRESS NOTES
In person evaluation        HPI  8/30/2023, hospital consultation  9/26/2023, in person visit  1/10/2024, in person visit      68-year-old being evaluated neurologically for:  Myasthenia gravis  B12 deficiency  Chronic kidney disease      Since last seen  Decreased Mestinon to 60 mg 3 times daily as the diarrhea did become more problematic  Has a lot of excessive tearing in the eyes which could be from the Mestinon  Uses it at 830/1:30 PM/9:30 PM just before he goes to bed  I talked him about stopping the 930 tablet  And then could consider going down to half a tablet for the ones during the day    Swelling in the feet is better  This is most likely from the prednisone and were trying to taper down on that  Current prednisone dose is 60 mg on the odd day and 40 mg on even day  New prednisone dose will be 60 mg on the day and 20 mg on even day    Will continue with the CellCept the same 750 mg twice daily    We talked about his CT of his chest showing no evidence of any thymoma or thymic mass    Did do a lot of wallpapering which took him 6 hours both days and did a lot of scraping    Does not seem to have any difficulty with diplopia dysarthria dysphagia  No shortness of breath talks in      No difficulty with ambulation    Did complain of 1 or 2 episodes of some bright red blood on the tissue when he wiped he does have hemorrhoids but I asked him to see his primary to have that checked out    We discussed  Side effects of CellCept/prednisone            A.  Myasthenia gravis        Myasthenia gravis crises 8/30/2023        ER 8/30/2023 with increasing symptoms from his myasthenia gravis.        Has had the diagnosis for at least 2 years followed at the [facility]         Recently seen at the [facility] day before admission has been on prednisone 60 mg for couple of days.           Patient had increasing fatigue during the month of August has seen his primary regard to this.           9/26/2023.  No shortness of  "breath  Talks and paragraphs  Some difficulty with swallowing in the morning but okay once he starts his meds  No significant diplopia  A little bit of left eyelid ptosis but not as bad as before  No proximal muscle weakness  Complains of feeling \"tired\"           Able to talk in long sentences         Able to swallow okay at bedside    B.  CKD 3 a, secondary to IgA nephropathy        Followed by Dr. Mcfarlane of [facility] [place] ([phone] for kidney disease)        CKD 3a, Ig A nephropathy.         Kidney biopsy done in August 2018 with IgA nephropathy with moderate interstitial fibrosis and tubular atrophy     Patient states that he is kidney doctor was okay with IV IgG and the osmotic load if necessary in the future.      C   B12 deficiency diagnosed by primary MD        B12 174,  (10/18/2023)         Primary MD was starting B12 replacement            Past neurologic history  Onset of ptosis and diplopia possibly back in August 2021  Diagnosed at the [facility] with what sounds like predominantly ocular myasthenia gravis  Treated with Mestinon and no immunosuppression until just recently August 2023 August 2023 developed fatigue/some air hunger, increased ptosis increased diplopia some trouble with swallowing.  With these symptoms he has moved to more generalized myasthenia gravis    He was started on steroids fairly recently in August and ramped up fairly quickly  In some patients they can get slight worsening of their myasthenia gravis before it improves with a quick increase in prednisone.    Recommend short course of plasma exchange to bring things under control August 30, 2023 when he presented to the ER now stabilized        Past medical history  Myasthenia gravis (November 2021)  CKD IgA nephropathy  Hypertension  Hypothyroidism  Prostatism/prostate cancer  Anemia  Psoriasis  Kidney stone  Adenomatous polyp    Habits  Past smoker quit  Does not drink alcohol    Family " history  Father colon cancer  Mother heart disease/hypertension  Maternal aunt heart disease  Sister hypertension      Work-up           8/30/2023  NIF       -40  VC       2.38  CT scan chest 10/30/2023  No thymic mass or other acute cardiopulmonary abnormality.   B12 174,  (10/18/2023)     Laboratory data review    NA/K                        138/4.0  BUN/Cr                     29/1.24  GLU                          78  Wbc/hgb                  11.8/12.3  Plts                           187,000  Exam  Review of systems  Pertinent positives and negatives  No diplopia no dysarthria no dysphagia  No ptosis  Does have watering of the eyes  Some bright red blood on the tissue when he wipes x 2 he will check with his primary for further investigation    Talks in long paragraphs  Complains of being tired but he did wallpapering 2 days in a row  No ataxia  Seems to be much better than last time I saw him  Otherwise review of systems negative      General exam  Blood pressure 145/81, pulse 66  Alert orient x3  HEENT no ptosis even with extended eye elevation  Lungs clear  Heart rate regular  Abdomen soft  Symmetrical pulses  No edema in the feet        Neurologic exam  Alert orient x3  Normal prosody of speech  Normal naming  Normal comprehension  Normal repetition  No aphasia  No neglect  Memory recall okay    Cranials 2 through 12  No ophthalmoplegia  No ptosis with extended eye elevation today  Eye closure strong  Mouth closure strong  Visual fields intact  Face symmetrical  Tongue twisters good      Neck flexors strong  Neck extensors strong    Upper extremities reported right over left  Deltoid 5/5  Biceps 5/5  Triceps 5/5  Wrist/finger extensors 5/5  Wrist/finger flexors 5/5  Intrinsic hand strength 5/5    No drift no tremor normal rapid alternating movements    Lower extremities reported right over left  Iliopsoas 5/5  Quadriceps 5/5  Hamstring 5/5  Anterior tibial 5/5    Gait  Able to stand up without  difficulty marches in place climbs up on the exam table does a deep knee bend          Assessment/plan    Myasthenia gravis exacerbation       Onset November 2021/diagnosis at the [facility]    2.  CKD stage III with IgA nephropathy with moderate interstitial fibrosis and tubular atrophy on previous biopsy       Concern with above renal disease patient not a good candidate for IV IgG.    3.   B12 deficiency diagnosed by primary MD        B12 174,  (10/18/2023)        Primary MD was starting B12 replacement    Diagnosis  Myasthenia gravis exacerbation  CKD stage III with IgA nephropathy  B12 deficiency (diagnosed 10/18/2023)        Patient's nephrologist said that IV IgG would be okay osmotic load would be okay for the kidney  We would stay for this for if he has a significant exacerbation    CT scan chest negative for thymic tumor or mass October 2023  CT scan of the chest rule out thymic tumor    CellCept 750 mg twice daily (started September 2023)    Prednisone being tapered  60 mg on the day, 20 mg on the even day (decreased 1/10/2024)    Mestinon 60 mg tablet 1 tab at 830, 1 tab at 1:30 PM  Could decrease to half a tablet twice per day if still with teary eyes or increased saliva or diarrhea    Seems to be improving with current therapy hold off on third line treatments    Rediscussed the diagnosis and treatment risks and benefits of various treatments as above  Also talked about Rituxan and other treatments should they become necessary  Talked about the more generalized nature of his myasthenia gravis at this time    Patient will call in April and we will see if we can decrease the prednisone on the even day a little bit further (down to 10 mg)  Patient will then follow-up in July  Discussed multiple issues as above  Total care time today 30 minutes    Addendum 4/1/2024  If patient is doing well I would recommend the following  Prednisone 60 mg on the odd day.  Prednisone 10 mg on the even day  (decreased dose 4/2/2024)    Continue pyridostigmine (Mestinon, the same)    Call in 1 month and if stable we would consider decreasing prednisone on the odd day a little bit  Keep follow-up visit 7/15/2024.    Addendum 4/22/2024  It is true that osteoporosis/soft bones is a side effect from the prednisone.  Due to his myasthenia gravis though the medication was necessary to help stabilize his condition.    Patient should follow-up/check with his primary MD to help with treatment of osteoporosis    Prednisone needs to be tapered slowly though both so that his myasthenia gravis does not flareup and also to prevent side effects from adrenal insufficiency.    Patient's prednisone dose was just decreased 4/2/2024 I thought we were going to wait a month before making further changes.    If patient is doing very well I would recommend the following.  Prednisone  50 mg once per day on the day  Prednisone 10 mg once per day on the even day.    Patient will need a combination of 10 mg and 20 mg prednisone tablets.  Probably need to update the prednisone 10 mg prescription so that he can take 1 tablet every day.  Prednisone 20 mg tablets would be 2 tablets on the  odd day.  leola Alamo MD on 4/22/2024 at 4:47 PM

## 2024-01-10 ENCOUNTER — OFFICE VISIT (OUTPATIENT)
Dept: NEUROLOGY | Facility: CLINIC | Age: 69
End: 2024-01-10
Payer: COMMERCIAL

## 2024-01-10 VITALS
HEIGHT: 70 IN | HEART RATE: 66 BPM | BODY MASS INDEX: 26.34 KG/M2 | WEIGHT: 184 LBS | DIASTOLIC BLOOD PRESSURE: 81 MMHG | SYSTOLIC BLOOD PRESSURE: 145 MMHG

## 2024-01-10 DIAGNOSIS — G70.00 MYASTHENIA GRAVIS (H): Primary | ICD-10-CM

## 2024-01-10 PROCEDURE — 99214 OFFICE O/P EST MOD 30 MIN: CPT | Performed by: PSYCHIATRY & NEUROLOGY

## 2024-01-10 RX ORDER — MYCOPHENOLATE MOFETIL 500 MG/1
750 TABLET ORAL 2 TIMES DAILY
Qty: 90 TABLET | Refills: 11 | Status: ON HOLD | OUTPATIENT
Start: 2024-01-10 | End: 2024-07-02

## 2024-01-10 RX ORDER — PYRIDOSTIGMINE BROMIDE 60 MG/1
TABLET ORAL
Qty: 60 TABLET | Refills: 11 | Status: SHIPPED | OUTPATIENT
Start: 2024-01-10 | End: 2024-07-01

## 2024-01-10 RX ORDER — PREDNISONE 20 MG/1
TABLET ORAL
Qty: 60 TABLET | Refills: 11 | Status: SHIPPED | OUTPATIENT
Start: 2024-01-10 | End: 2024-04-29

## 2024-01-10 NOTE — LETTER
1/10/2024         RE: Maurice Biswas  [address]  [place] [address]        Dear Colleague,    Thank you for referring your patient, Maurice Biswas, to the [facility] [place]. Please see a copy of my visit note below.    Addendum 10/31/2023  In person evaluation        HPI  8/30/2023, hospital consultation  9/26/2023, in person visit  1/10/2024, in person visit      68-year-old being evaluated neurologically for:  Myasthenia gravis  B12 deficiency  Chronic kidney disease      Since last seen  Decreased Mestinon to 60 mg 3 times daily as the diarrhea did become more problematic  Has a lot of excessive tearing in the eyes which could be from the Mestinon  Uses it at 830/1:30 PM/9:30 PM just before he goes to bed  I talked him about stopping the 930 tablet  And then could consider going down to half a tablet for the ones during the day    Swelling in the feet is better  This is most likely from the prednisone and were trying to taper down on that  Current prednisone dose is 60 mg on the odd day and 40 mg on even day  New prednisone dose will be 60 mg on the day and 20 mg on even day    Will continue with the CellCept the same 750 mg twice daily    We talked about his CT of his chest showing no evidence of any thymoma or thymic mass    Did do a lot of wallpapering which took him 6 hours both days and did a lot of scraping    Does not seem to have any difficulty with diplopia dysarthria dysphagia  No shortness of breath talks in      No difficulty with ambulation    Did complain of 1 or 2 episodes of some bright red blood on the tissue when he wiped he does have hemorrhoids but I asked him to see his primary to have that checked out    We discussed  Side effects of CellCept/prednisone            A.  Myasthenia gravis        Myasthenia gravis crises 8/30/2023        ER 8/30/2023 with increasing symptoms from his myasthenia gravis.        Has had the diagnosis for at least 2 years  "followed at the [facility]         Recently seen at the [facility] day before admission has been on prednisone 60 mg for couple of days.           Patient had increasing fatigue during the month of August has seen his primary regard to this.           9/26/2023.  No shortness of breath  Talks and paragraphs  Some difficulty with swallowing in the morning but okay once he starts his meds  No significant diplopia  A little bit of left eyelid ptosis but not as bad as before  No proximal muscle weakness  Complains of feeling \"tired\"           Able to talk in long sentences         Able to swallow okay at bedside    B.  CKD 3 a, secondary to IgA nephropathy        Followed by Dr. Mcfarlane of [facility] [place] ([phone] for kidney disease)        CKD 3a, Ig A nephropathy.         Kidney biopsy done in August 2018 with IgA nephropathy with moderate interstitial fibrosis and tubular atrophy     Patient states that he is kidney doctor was okay with IV IgG and the osmotic load if necessary in the future.      C   B12 deficiency diagnosed by primary MD        B12 174,  (10/18/2023)         Primary MD was starting B12 replacement            Past neurologic history  Onset of ptosis and diplopia possibly back in August 2021  Diagnosed at the [facility] with what sounds like predominantly ocular myasthenia gravis  Treated with Mestinon and no immunosuppression until just recently August 2023 August 2023 developed fatigue/some air hunger, increased ptosis increased diplopia some trouble with swallowing.  With these symptoms he has moved to more generalized myasthenia gravis    He was started on steroids fairly recently in August and ramped up fairly quickly  In some patients they can get slight worsening of their myasthenia gravis before it improves with a quick increase in prednisone.    Recommend short course of plasma exchange to bring things under control August 30, 2023 when he presented to " the ER now stabilized        Past medical history  Myasthenia gravis (November 2021)  CKD IgA nephropathy  Hypertension  Hypothyroidism  Prostatism/prostate cancer  Anemia  Psoriasis  Kidney stone  Adenomatous polyp    Habits  Past smoker quit  Does not drink alcohol    Family history  Father colon cancer  Mother heart disease/hypertension  Maternal aunt heart disease  Sister hypertension      Work-up           8/30/2023  NIF       -40  VC       2.38  CT scan chest 10/30/2023  No thymic mass or other acute cardiopulmonary abnormality.   B12 174,  (10/18/2023)     Laboratory data review    NA/K                        138/4.0  BUN/Cr                     29/1.24  GLU                          78  Wbc/hgb                  11.8/12.3  Plts                           187,000  Exam  Review of systems  Pertinent positives and negatives  No diplopia no dysarthria no dysphagia  No ptosis  Does have watering of the eyes  Some bright red blood on the tissue when he wipes x 2 he will check with his primary for further investigation    Talks in long paragraphs  Complains of being tired but he did wallpapering 2 days in a row  No ataxia  Seems to be much better than last time I saw him  Otherwise review of systems negative      General exam  Blood pressure 145/81, pulse 66  Alert orient x3  HEENT no ptosis even with extended eye elevation  Lungs clear  Heart rate regular  Abdomen soft  Symmetrical pulses  No edema in the feet        Neurologic exam  Alert orient x3  Normal prosody of speech  Normal naming  Normal comprehension  Normal repetition  No aphasia  No neglect  Memory recall okay    Cranials 2 through 12  No ophthalmoplegia  No ptosis with extended eye elevation today  Eye closure strong  Mouth closure strong  Visual fields intact  Face symmetrical  Tongue twisters good      Neck flexors strong  Neck extensors strong    Upper extremities reported right over left  Deltoid 5/5  Biceps 5/5  Triceps 5/5  Wrist/finger  extensors 5/5  Wrist/finger flexors 5/5  Intrinsic hand strength 5/5    No drift no tremor normal rapid alternating movements    Lower extremities reported right over left  Iliopsoas 5/5  Quadriceps 5/5  Hamstring 5/5  Anterior tibial 5/5    Gait  Able to stand up without difficulty marches in place climbs up on the exam table does a deep knee bend          Assessment/plan    Myasthenia gravis exacerbation       Onset November 2021/diagnosis at the [facility]    2.  CKD stage III with IgA nephropathy with moderate interstitial fibrosis and tubular atrophy on previous biopsy       Concern with above renal disease patient not a good candidate for IV IgG.    3.   B12 deficiency diagnosed by primary MD        B12 174,  (10/18/2023)        Primary MD was starting B12 replacement    Diagnosis  Myasthenia gravis exacerbation  CKD stage III with IgA nephropathy  B12 deficiency (diagnosed 10/18/2023)        Patient's nephrologist said that IV IgG would be okay osmotic load would be okay for the kidney  We would stay for this for if he has a significant exacerbation    CT scan chest negative for thymic tumor or mass October 2023  CT scan of the chest rule out thymic tumor    CellCept 750 mg twice daily (started September 2023)    Prednisone being tapered  60 mg on the day, 20 mg on the even day (decreased 1/10/2024)    Mestinon 60 mg tablet 1 tab at 830, 1 tab at 1:30 PM  Could decrease to half a tablet twice per day if still with teary eyes or increased saliva or diarrhea    Seems to be improving with current therapy hold off on third line treatments    Rediscussed the diagnosis and treatment risks and benefits of various treatments as above  Also talked about Rituxan and other treatments should they become necessary  Talked about the more generalized nature of his myasthenia gravis at this time    Patient will call in April and we will see if we can decrease the prednisone on the even day a little bit further (down  to 10 mg)  Patient will then follow-up in July  Discussed multiple issues as above  Total care time today 30 minutes        Again, thank you for allowing me to participate in the care of your patient.        Sincerely,        leola Alamo MD

## 2024-01-10 NOTE — NURSING NOTE
Chief Complaint   Patient presents with    Myasthenia Gravis     4 month follow up. Pt states he feels a little worse. Pt states he feels more tired.     Aurora Lorenzo LPN on 1/10/2024 at 1:48 PM

## 2024-01-18 ENCOUNTER — LAB REQUISITION (OUTPATIENT)
Dept: LAB | Facility: CLINIC | Age: 69
End: 2024-01-18
Payer: COMMERCIAL

## 2024-01-18 ENCOUNTER — TRANSFERRED RECORDS (OUTPATIENT)
Dept: HEALTH INFORMATION MANAGEMENT | Facility: CLINIC | Age: 69
End: 2024-01-18
Payer: COMMERCIAL

## 2024-01-18 DIAGNOSIS — L08.9 LOCAL INFECTION OF THE SKIN AND SUBCUTANEOUS TISSUE, UNSPECIFIED: ICD-10-CM

## 2024-01-18 LAB
GRAM STAIN RESULT: NORMAL

## 2024-01-18 PROCEDURE — 87102 FUNGUS ISOLATION CULTURE: CPT | Mod: ORL | Performed by: ORTHOPAEDIC SURGERY

## 2024-01-18 PROCEDURE — 87205 SMEAR GRAM STAIN: CPT | Mod: ORL | Performed by: ORTHOPAEDIC SURGERY

## 2024-01-18 PROCEDURE — 87075 CULTR BACTERIA EXCEPT BLOOD: CPT | Mod: ORL | Performed by: ORTHOPAEDIC SURGERY

## 2024-01-18 PROCEDURE — 87070 CULTURE OTHR SPECIMN AEROBIC: CPT | Mod: ORL | Performed by: ORTHOPAEDIC SURGERY

## 2024-01-18 PROCEDURE — 87102 FUNGUS ISOLATION CULTURE: CPT | Mod: ORL,XU | Performed by: ORTHOPAEDIC SURGERY

## 2024-01-18 PROCEDURE — 88305 TISSUE EXAM BY PATHOLOGIST: CPT | Mod: TC,ORL | Performed by: ORTHOPAEDIC SURGERY

## 2024-01-18 PROCEDURE — 87206 SMEAR FLUORESCENT/ACID STAI: CPT | Mod: ORL | Performed by: ORTHOPAEDIC SURGERY

## 2024-01-18 PROCEDURE — 87186 SC STD MICRODIL/AGAR DIL: CPT | Mod: ORL | Performed by: ORTHOPAEDIC SURGERY

## 2024-01-18 PROCEDURE — 87070 CULTURE OTHR SPECIMN AEROBIC: CPT | Mod: ORL,XU | Performed by: ORTHOPAEDIC SURGERY

## 2024-01-19 ENCOUNTER — LAB REQUISITION (OUTPATIENT)
Dept: LAB | Facility: CLINIC | Age: 69
End: 2024-01-19
Payer: COMMERCIAL

## 2024-01-19 DIAGNOSIS — M77.8 OTHER ENTHESOPATHIES, NOT ELSEWHERE CLASSIFIED: ICD-10-CM

## 2024-01-20 LAB — BACTERIA TISS BX CULT: ABNORMAL

## 2024-01-21 LAB — BACTERIA TISS BX CULT: ABNORMAL

## 2024-01-22 ENCOUNTER — TELEPHONE (OUTPATIENT)
Dept: INFECTIOUS DISEASES | Facility: CLINIC | Age: 69
End: 2024-01-22

## 2024-01-22 LAB
PATH REPORT.COMMENTS IMP SPEC: NORMAL
PATH REPORT.COMMENTS IMP SPEC: NORMAL
PATH REPORT.FINAL DX SPEC: NORMAL
PATH REPORT.GROSS SPEC: NORMAL
PATH REPORT.MICROSCOPIC SPEC OTHER STN: NORMAL
PATH REPORT.RELEVANT HX SPEC: NORMAL
PHOTO IMAGE: NORMAL

## 2024-01-22 PROCEDURE — 88305 TISSUE EXAM BY PATHOLOGIST: CPT | Mod: 26 | Performed by: PATHOLOGY

## 2024-01-22 NOTE — TELEPHONE ENCOUNTER
[facility]    Spoke with [facility] and they will be faxing records. Please watch for records.

## 2024-01-22 NOTE — TELEPHONE ENCOUNTER
Please contact Dr Chapin's office for the recent operative note, labs, imaging, and chart notes. Please schedule the pt for tomorrow at 920 with Dr Granados, per Dr Granados, as he was paged about this case.

## 2024-01-23 ENCOUNTER — LAB (OUTPATIENT)
Dept: LAB | Facility: CLINIC | Age: 69
End: 2024-01-23
Payer: COMMERCIAL

## 2024-01-23 ENCOUNTER — TELEPHONE (OUTPATIENT)
Dept: INFECTIOUS DISEASES | Facility: CLINIC | Age: 69
End: 2024-01-23

## 2024-01-23 ENCOUNTER — OFFICE VISIT (OUTPATIENT)
Dept: INFECTIOUS DISEASES | Facility: CLINIC | Age: 69
End: 2024-01-23
Payer: COMMERCIAL

## 2024-01-23 VITALS
SYSTOLIC BLOOD PRESSURE: 130 MMHG | WEIGHT: 192.2 LBS | OXYGEN SATURATION: 98 % | TEMPERATURE: 97.9 F | HEART RATE: 83 BPM | DIASTOLIC BLOOD PRESSURE: 72 MMHG | BODY MASS INDEX: 27.58 KG/M2

## 2024-01-23 DIAGNOSIS — M00.841: Primary | ICD-10-CM

## 2024-01-23 DIAGNOSIS — M00.841: ICD-10-CM

## 2024-01-23 PROBLEM — M00.9: Status: ACTIVE | Noted: 2024-01-23

## 2024-01-23 LAB
ANION GAP SERPL CALCULATED.3IONS-SCNC: 10 MMOL/L (ref 7–15)
BACTERIA TISS BX CULT: NO GROWTH
BACTERIA TISS BX CULT: NO GROWTH
BASOPHILS # BLD AUTO: 0 10E3/UL (ref 0–0.2)
BASOPHILS NFR BLD AUTO: 0 %
BUN SERPL-MCNC: 26.1 MG/DL (ref 8–23)
CALCIUM SERPL-MCNC: 9.5 MG/DL (ref 8.8–10.2)
CHLORIDE SERPL-SCNC: 106 MMOL/L (ref 98–107)
CREAT SERPL-MCNC: 1.33 MG/DL (ref 0.67–1.17)
CRP SERPL-MCNC: <3 MG/L
DEPRECATED HCO3 PLAS-SCNC: 28 MMOL/L (ref 22–29)
EGFRCR SERPLBLD CKD-EPI 2021: 58 ML/MIN/1.73M2
EOSINOPHIL # BLD AUTO: 0 10E3/UL (ref 0–0.7)
EOSINOPHIL NFR BLD AUTO: 0 %
ERYTHROCYTE [DISTWIDTH] IN BLOOD BY AUTOMATED COUNT: 13.2 % (ref 10–15)
GLUCOSE SERPL-MCNC: 82 MG/DL (ref 70–99)
HCT VFR BLD AUTO: 42.8 % (ref 40–53)
HGB BLD-MCNC: 13.8 G/DL (ref 13.3–17.7)
IMM GRANULOCYTES # BLD: 0.2 10E3/UL
IMM GRANULOCYTES NFR BLD: 2 %
LYMPHOCYTES # BLD AUTO: 2.6 10E3/UL (ref 0.8–5.3)
LYMPHOCYTES NFR BLD AUTO: 23 %
MCH RBC QN AUTO: 29.9 PG (ref 26.5–33)
MCHC RBC AUTO-ENTMCNC: 32.2 G/DL (ref 31.5–36.5)
MCV RBC AUTO: 93 FL (ref 78–100)
MONOCYTES # BLD AUTO: 0.9 10E3/UL (ref 0–1.3)
MONOCYTES NFR BLD AUTO: 8 %
NEUTROPHILS # BLD AUTO: 7.6 10E3/UL (ref 1.6–8.3)
NEUTROPHILS NFR BLD AUTO: 67 %
PLATELET # BLD AUTO: 161 10E3/UL (ref 150–450)
POTASSIUM SERPL-SCNC: 4.2 MMOL/L (ref 3.4–5.3)
RBC # BLD AUTO: 4.61 10E6/UL (ref 4.4–5.9)
SODIUM SERPL-SCNC: 144 MMOL/L (ref 135–145)
WBC # BLD AUTO: 11.2 10E3/UL (ref 4–11)

## 2024-01-23 PROCEDURE — 99204 OFFICE O/P NEW MOD 45 MIN: CPT | Performed by: STUDENT IN AN ORGANIZED HEALTH CARE EDUCATION/TRAINING PROGRAM

## 2024-01-23 PROCEDURE — 36415 COLL VENOUS BLD VENIPUNCTURE: CPT

## 2024-01-23 PROCEDURE — 80048 BASIC METABOLIC PNL TOTAL CA: CPT

## 2024-01-23 PROCEDURE — 85025 COMPLETE CBC W/AUTO DIFF WBC: CPT

## 2024-01-23 PROCEDURE — 86140 C-REACTIVE PROTEIN: CPT

## 2024-01-23 RX ORDER — METHYLPREDNISOLONE SODIUM SUCCINATE 125 MG/2ML
125 INJECTION, POWDER, LYOPHILIZED, FOR SOLUTION INTRAMUSCULAR; INTRAVENOUS
Status: CANCELLED
Start: 2024-01-23

## 2024-01-23 RX ORDER — DIPHENHYDRAMINE HYDROCHLORIDE 50 MG/ML
50 INJECTION INTRAMUSCULAR; INTRAVENOUS
Status: CANCELLED
Start: 2024-01-23

## 2024-01-23 RX ORDER — HEPARIN SODIUM (PORCINE) LOCK FLUSH IV SOLN 100 UNIT/ML 100 UNIT/ML
5 SOLUTION INTRAVENOUS
Status: CANCELLED | OUTPATIENT
Start: 2024-01-23

## 2024-01-23 RX ORDER — ALBUTEROL SULFATE 90 UG/1
1-2 AEROSOL, METERED RESPIRATORY (INHALATION)
Status: CANCELLED
Start: 2024-01-23

## 2024-01-23 RX ORDER — ALBUTEROL SULFATE 0.83 MG/ML
2.5 SOLUTION RESPIRATORY (INHALATION)
Status: CANCELLED | OUTPATIENT
Start: 2024-01-23

## 2024-01-23 RX ORDER — HEPARIN SODIUM,PORCINE 10 UNIT/ML
5-20 VIAL (ML) INTRAVENOUS DAILY PRN
Status: CANCELLED | OUTPATIENT
Start: 2024-01-23

## 2024-01-23 RX ORDER — EPINEPHRINE 1 MG/ML
0.3 INJECTION, SOLUTION, CONCENTRATE INTRAVENOUS EVERY 5 MIN PRN
Status: CANCELLED | OUTPATIENT
Start: 2024-01-23

## 2024-01-23 ASSESSMENT — PAIN SCALES - GENERAL: PAINLEVEL: NO PAIN (0)

## 2024-01-23 NOTE — TELEPHONE ENCOUNTER
E-Mail sent to [facility] and samantha to check benefits/self-pay quotes. Samantha contacted me back and informed me that his insurance is out of network.  I will await [facility] benefit check.  Pt will need a first dose of IV abx and a PICC line.    PICC line scheduled for tomorrow 1/24/24 at [facility] at 2:30 pm with a 2:15 arrival.    First dose appt is at 1/24/24 at 12:30 with a 12:15 arrival at [facility].     Pt informed of the above. I will contact him with the benefit check when I have it.

## 2024-01-24 ENCOUNTER — INFUSION THERAPY VISIT (OUTPATIENT)
Dept: INFUSION THERAPY | Facility: HOSPITAL | Age: 69
End: 2024-01-24
Payer: COMMERCIAL

## 2024-01-24 VITALS
DIASTOLIC BLOOD PRESSURE: 82 MMHG | SYSTOLIC BLOOD PRESSURE: 147 MMHG | RESPIRATION RATE: 16 BRPM | OXYGEN SATURATION: 99 % | HEART RATE: 66 BPM | TEMPERATURE: 97.7 F

## 2024-01-24 DIAGNOSIS — M00.841: Primary | ICD-10-CM

## 2024-01-24 PROCEDURE — 250N000011 HC RX IP 250 OP 636: Performed by: STUDENT IN AN ORGANIZED HEALTH CARE EDUCATION/TRAINING PROGRAM

## 2024-01-24 PROCEDURE — 258N000003 HC RX IP 258 OP 636: Performed by: STUDENT IN AN ORGANIZED HEALTH CARE EDUCATION/TRAINING PROGRAM

## 2024-01-24 PROCEDURE — 96521 REFILL/MAINT PORTABLE PUMP: CPT

## 2024-01-24 RX ORDER — EPINEPHRINE 1 MG/ML
0.3 INJECTION, SOLUTION INTRAMUSCULAR; SUBCUTANEOUS EVERY 5 MIN PRN
Status: DISCONTINUED | OUTPATIENT
Start: 2024-01-24 | End: 2024-01-24 | Stop reason: HOSPADM

## 2024-01-24 RX ORDER — CEFEPIME HYDROCHLORIDE 2 G/1
2 INJECTION, POWDER, FOR SOLUTION INTRAVENOUS EVERY 12 HOURS
Status: DISCONTINUED | OUTPATIENT
Start: 2024-01-24 | End: 2024-01-24

## 2024-01-24 RX ORDER — ALBUTEROL SULFATE 90 UG/1
1-2 AEROSOL, METERED RESPIRATORY (INHALATION)
Status: CANCELLED
Start: 2024-01-25

## 2024-01-24 RX ORDER — HEPARIN SODIUM (PORCINE) LOCK FLUSH IV SOLN 100 UNIT/ML 100 UNIT/ML
5 SOLUTION INTRAVENOUS
Status: CANCELLED | OUTPATIENT
Start: 2024-01-25

## 2024-01-24 RX ORDER — HEPARIN SODIUM,PORCINE 10 UNIT/ML
5-20 VIAL (ML) INTRAVENOUS DAILY PRN
Status: CANCELLED | OUTPATIENT
Start: 2024-01-25

## 2024-01-24 RX ORDER — ALBUTEROL SULFATE 0.83 MG/ML
2.5 SOLUTION RESPIRATORY (INHALATION)
Status: DISCONTINUED | OUTPATIENT
Start: 2024-01-24 | End: 2024-01-24 | Stop reason: HOSPADM

## 2024-01-24 RX ORDER — METHYLPREDNISOLONE SODIUM SUCCINATE 125 MG/2ML
125 INJECTION, POWDER, LYOPHILIZED, FOR SOLUTION INTRAMUSCULAR; INTRAVENOUS
Status: CANCELLED
Start: 2024-01-25

## 2024-01-24 RX ORDER — CEFEPIME HYDROCHLORIDE 2 G/1
2 INJECTION, POWDER, FOR SOLUTION INTRAVENOUS EVERY 12 HOURS
Status: CANCELLED
Start: 2024-01-25

## 2024-01-24 RX ORDER — CEFEPIME HYDROCHLORIDE 2 G/1
2 INJECTION, POWDER, FOR SOLUTION INTRAVENOUS ONCE
Status: CANCELLED | OUTPATIENT
Start: 2024-01-24

## 2024-01-24 RX ORDER — METHYLPREDNISOLONE SODIUM SUCCINATE 125 MG/2ML
125 INJECTION, POWDER, LYOPHILIZED, FOR SOLUTION INTRAMUSCULAR; INTRAVENOUS
Status: DISCONTINUED | OUTPATIENT
Start: 2024-01-24 | End: 2024-01-24 | Stop reason: HOSPADM

## 2024-01-24 RX ORDER — DIPHENHYDRAMINE HYDROCHLORIDE 50 MG/ML
50 INJECTION INTRAMUSCULAR; INTRAVENOUS
Status: DISCONTINUED | OUTPATIENT
Start: 2024-01-24 | End: 2024-01-24 | Stop reason: HOSPADM

## 2024-01-24 RX ORDER — DIPHENHYDRAMINE HYDROCHLORIDE 50 MG/ML
50 INJECTION INTRAMUSCULAR; INTRAVENOUS
Status: CANCELLED
Start: 2024-01-25

## 2024-01-24 RX ORDER — ALBUTEROL SULFATE 0.83 MG/ML
2.5 SOLUTION RESPIRATORY (INHALATION)
Status: CANCELLED | OUTPATIENT
Start: 2024-01-25

## 2024-01-24 RX ORDER — EPINEPHRINE 1 MG/ML
0.3 INJECTION, SOLUTION INTRAMUSCULAR; SUBCUTANEOUS EVERY 5 MIN PRN
Status: CANCELLED | OUTPATIENT
Start: 2024-01-25

## 2024-01-24 RX ORDER — ALBUTEROL SULFATE 90 UG/1
1-2 AEROSOL, METERED RESPIRATORY (INHALATION)
Status: DISCONTINUED | OUTPATIENT
Start: 2024-01-24 | End: 2024-01-24 | Stop reason: HOSPADM

## 2024-01-24 RX ADMIN — CEFEPIME HYDROCHLORIDE 8 G: 2 INJECTION, POWDER, FOR SOLUTION INTRAVENOUS at 14:48

## 2024-01-24 ASSESSMENT — PAIN SCALES - GENERAL: PAINLEVEL: NO PAIN (0)

## 2024-01-24 NOTE — TELEPHONE ENCOUNTER
Per [facility];  Pt does not have coverage for IV abx with their [employer] plan. Drug would be billed to the part D and supplies would be self-pay. Based on Cefepime 2gm q12h, total cost is $43.40/day for drug and supplies.     For nursing, patient should have coverage if homebound, however [facility] is not contracted with Medicare and an outside nursing agency would be utilized instead. If patient is not homebound, there is no coverage and I can see patient if patient agrees to self-pay for $90 per visit.

## 2024-01-24 NOTE — PROGRESS NOTES
Infusion Nursing Note:  Maurice Biswas presents today for Maxipime    Patient seen by provider today: No   present during visit today: Not Applicable.    Note: Maurice arrived ambulatory into the infusion center accompanied by his wife for PICC placement and 48 hours CADD  Pump initial dose of antibiotics. VSS. Plan of care reviewed, they verbalized understanding of his plan of care and PICC placed. Antibiotic order was clarified and CADD pump education done. First dose was given over 30 minutes and monitored. 48 hours pump Q 12 hourly intervals sent home with Maurice, extra batteries and [facility] emergency contact sent home with him.He left in a stable condition with print out of next appointments      Intravenous Access:  PICC.    Treatment Conditions:  Not Applicable.      Post Infusion Assessment:  Patient tolerated infusion without incident.  Patient observed for 30 minutes post !st antibiotics per protocol.  Site patent and intact, free from redness, edema or discomfort.  No evidence of extravasations.       Discharge Plan:   Discharge instructions reviewed with: Patient and Family.  Patient and/or family verbalized understanding of discharge instructions and all questions answered.  Copy of AVS reviewed with patient and/or family.  Patient will return 1/26/24 for next appointment.  Patient discharged in stable condition accompanied by: wife.  Departure Mode: Ambulatory.      Noelle Fraire RN

## 2024-01-24 NOTE — TELEPHONE ENCOUNTER
I called LM for the pt to c/b to let me know if he wants to move forward with [facility] or go to the infusion center.

## 2024-01-25 LAB
BACTERIA TISS BX CULT: NORMAL

## 2024-01-26 ENCOUNTER — INFUSION THERAPY VISIT (OUTPATIENT)
Dept: INFUSION THERAPY | Facility: HOSPITAL | Age: 69
End: 2024-01-26
Payer: COMMERCIAL

## 2024-01-26 VITALS
DIASTOLIC BLOOD PRESSURE: 74 MMHG | OXYGEN SATURATION: 97 % | SYSTOLIC BLOOD PRESSURE: 117 MMHG | RESPIRATION RATE: 16 BRPM | TEMPERATURE: 98 F | HEART RATE: 82 BPM

## 2024-01-26 DIAGNOSIS — M00.841: Primary | ICD-10-CM

## 2024-01-26 PROCEDURE — 250N000011 HC RX IP 250 OP 636: Performed by: STUDENT IN AN ORGANIZED HEALTH CARE EDUCATION/TRAINING PROGRAM

## 2024-01-26 PROCEDURE — 258N000003 HC RX IP 258 OP 636: Performed by: STUDENT IN AN ORGANIZED HEALTH CARE EDUCATION/TRAINING PROGRAM

## 2024-01-26 PROCEDURE — 96521 REFILL/MAINT PORTABLE PUMP: CPT

## 2024-01-26 RX ORDER — ALBUTEROL SULFATE 0.83 MG/ML
2.5 SOLUTION RESPIRATORY (INHALATION)
Status: DISCONTINUED | OUTPATIENT
Start: 2024-01-26 | End: 2024-01-26 | Stop reason: HOSPADM

## 2024-01-26 RX ORDER — ALBUTEROL SULFATE 90 UG/1
1-2 AEROSOL, METERED RESPIRATORY (INHALATION)
Status: CANCELLED
Start: 2024-01-28

## 2024-01-26 RX ORDER — METHYLPREDNISOLONE SODIUM SUCCINATE 125 MG/2ML
125 INJECTION, POWDER, LYOPHILIZED, FOR SOLUTION INTRAMUSCULAR; INTRAVENOUS
Status: CANCELLED
Start: 2024-01-28

## 2024-01-26 RX ORDER — ALBUTEROL SULFATE 90 UG/1
1-2 AEROSOL, METERED RESPIRATORY (INHALATION)
Status: DISCONTINUED | OUTPATIENT
Start: 2024-01-26 | End: 2024-01-26 | Stop reason: HOSPADM

## 2024-01-26 RX ORDER — DIPHENHYDRAMINE HYDROCHLORIDE 50 MG/ML
50 INJECTION INTRAMUSCULAR; INTRAVENOUS
Status: CANCELLED
Start: 2024-01-28

## 2024-01-26 RX ORDER — DIPHENHYDRAMINE HYDROCHLORIDE 50 MG/ML
50 INJECTION INTRAMUSCULAR; INTRAVENOUS
Status: DISCONTINUED | OUTPATIENT
Start: 2024-01-26 | End: 2024-01-26 | Stop reason: HOSPADM

## 2024-01-26 RX ORDER — HEPARIN SODIUM (PORCINE) LOCK FLUSH IV SOLN 100 UNIT/ML 100 UNIT/ML
5 SOLUTION INTRAVENOUS
Status: CANCELLED | OUTPATIENT
Start: 2024-01-28

## 2024-01-26 RX ORDER — EPINEPHRINE 1 MG/ML
0.3 INJECTION, SOLUTION INTRAMUSCULAR; SUBCUTANEOUS EVERY 5 MIN PRN
Status: CANCELLED | OUTPATIENT
Start: 2024-01-28

## 2024-01-26 RX ORDER — EPINEPHRINE 1 MG/ML
0.3 INJECTION, SOLUTION INTRAMUSCULAR; SUBCUTANEOUS EVERY 5 MIN PRN
Status: DISCONTINUED | OUTPATIENT
Start: 2024-01-26 | End: 2024-01-26 | Stop reason: HOSPADM

## 2024-01-26 RX ORDER — METHYLPREDNISOLONE SODIUM SUCCINATE 125 MG/2ML
125 INJECTION, POWDER, LYOPHILIZED, FOR SOLUTION INTRAMUSCULAR; INTRAVENOUS
Status: DISCONTINUED | OUTPATIENT
Start: 2024-01-26 | End: 2024-01-26 | Stop reason: HOSPADM

## 2024-01-26 RX ORDER — ALBUTEROL SULFATE 0.83 MG/ML
2.5 SOLUTION RESPIRATORY (INHALATION)
Status: CANCELLED | OUTPATIENT
Start: 2024-01-28

## 2024-01-26 RX ORDER — HEPARIN SODIUM,PORCINE 10 UNIT/ML
5-20 VIAL (ML) INTRAVENOUS DAILY PRN
Status: CANCELLED | OUTPATIENT
Start: 2024-01-28

## 2024-01-26 RX ADMIN — CEFEPIME HYDROCHLORIDE 8 G: 2 INJECTION, POWDER, FOR SOLUTION INTRAVENOUS at 13:18

## 2024-01-26 NOTE — PROGRESS NOTES
Infusion Nursing Note:  Maurice Biswas presents today for an antibiotic pump exchange.    Patient seen by provider today: No   present during visit today: Not Applicable.    Note: Maurice comes to infusion ambulatory and in stable condition. Here for an antibiotic pump exchange. States the pump was beeping overnight, and he needed to call to get it up and restarted again, otherwise no other issues over the last few days. The antibiotic bag was switched out, batteries to the CADD pump changed, and verified the pump was set to the correct settings. Left infusion ambulatory and in stable condition. [facility], and will be back at [facility] infusion on Tuesday.     /74 (BP Location: Left arm, Patient Position: Sitting, Cuff Size: Adult Regular)   Pulse 82   Temp 98  F (36.7  C) (Oral)   Resp 16   SpO2 97%     Intravenous Access:  PICC.    Treatment Conditions:  Not Applicable.    Post Infusion Assessment:  Patient has been tolerating the antibiotic without incident.     Discharge Plan:   Discharge instructions reviewed with: Patient.  Patient and/or family verbalized understanding of discharge instructions and all questions answered.  AVS to patient via [facility].  Patient will return on Sunday (At the ) for next appointment.   Patient discharged in stable condition accompanied by: wife.  Departure Mode: Ambulatory.    Joan Grier RN

## 2024-01-28 ENCOUNTER — INFUSION THERAPY VISIT (OUTPATIENT)
Dept: ONCOLOGY | Facility: CLINIC | Age: 69
End: 2024-01-28
Attending: INTERNAL MEDICINE
Payer: COMMERCIAL

## 2024-01-28 VITALS
RESPIRATION RATE: 16 BRPM | DIASTOLIC BLOOD PRESSURE: 76 MMHG | HEART RATE: 73 BPM | SYSTOLIC BLOOD PRESSURE: 129 MMHG | TEMPERATURE: 98 F | OXYGEN SATURATION: 96 %

## 2024-01-28 DIAGNOSIS — M00.841: Primary | ICD-10-CM

## 2024-01-28 PROCEDURE — 250N000011 HC RX IP 250 OP 636: Mod: JZ | Performed by: STUDENT IN AN ORGANIZED HEALTH CARE EDUCATION/TRAINING PROGRAM

## 2024-01-28 PROCEDURE — 96521 REFILL/MAINT PORTABLE PUMP: CPT

## 2024-01-28 PROCEDURE — 258N000003 HC RX IP 258 OP 636: Performed by: STUDENT IN AN ORGANIZED HEALTH CARE EDUCATION/TRAINING PROGRAM

## 2024-01-28 RX ORDER — ALBUTEROL SULFATE 0.83 MG/ML
2.5 SOLUTION RESPIRATORY (INHALATION)
Status: CANCELLED | OUTPATIENT
Start: 2024-01-30

## 2024-01-28 RX ORDER — DIPHENHYDRAMINE HYDROCHLORIDE 50 MG/ML
50 INJECTION INTRAMUSCULAR; INTRAVENOUS
Status: CANCELLED
Start: 2024-01-30

## 2024-01-28 RX ORDER — ALBUTEROL SULFATE 90 UG/1
1-2 AEROSOL, METERED RESPIRATORY (INHALATION)
Status: CANCELLED
Start: 2024-01-30

## 2024-01-28 RX ORDER — HEPARIN SODIUM (PORCINE) LOCK FLUSH IV SOLN 100 UNIT/ML 100 UNIT/ML
5 SOLUTION INTRAVENOUS
Status: CANCELLED | OUTPATIENT
Start: 2024-01-30

## 2024-01-28 RX ORDER — HEPARIN SODIUM,PORCINE 10 UNIT/ML
5-20 VIAL (ML) INTRAVENOUS DAILY PRN
Status: CANCELLED | OUTPATIENT
Start: 2024-01-30

## 2024-01-28 RX ORDER — EPINEPHRINE 1 MG/ML
0.3 INJECTION, SOLUTION INTRAMUSCULAR; SUBCUTANEOUS EVERY 5 MIN PRN
Status: CANCELLED | OUTPATIENT
Start: 2024-01-30

## 2024-01-28 RX ORDER — METHYLPREDNISOLONE SODIUM SUCCINATE 125 MG/2ML
125 INJECTION, POWDER, LYOPHILIZED, FOR SOLUTION INTRAMUSCULAR; INTRAVENOUS
Status: CANCELLED
Start: 2024-01-30

## 2024-01-28 ASSESSMENT — PAIN SCALES - GENERAL: PAINLEVEL: NO PAIN (0)

## 2024-01-28 NOTE — PROGRESS NOTES
"Infusion Nursing Note:  Maurice Biswas presents today for Cefepime CADD pump bag change.    Patient seen by provider today: No   present during visit today: Not Applicable.    Note: Patient has no concerns to report today. Bag/dose done upon arrival, patient noted it beeped low volume about 0345am. Next dose due at 1515.       Intravenous Access:  PICC.    Treatment Conditions:  Not Applicable.      Post Infusion Assessment:  SL PICC with positive blood return  Prior to discharge: PICC secured in place with tegaderm and flushed with 10cc NS with positive blood return noted.    Continuous/12 hr interval bolus settings confirmed and CADD pump connected.    All connectors secured in place and clamps taped open.    Pump started, \"running\" noted on display (CADD): YES.   CADD pump reset and double check with pharmacistMark.     Patient instructed to call our clinic or [facility] with any questions or concerns at home.  Patient verbalized understanding.    Patient set up for pump bag change next at [facility] on 1/30.       Discharge Plan:   [facility]  Patient will return 1/30 for next appointment.   Patient discharged in stable condition accompanied by: wife.  Departure Mode: Ambulatory.      Senait Elkins RN  "

## 2024-01-29 ENCOUNTER — INFUSION THERAPY VISIT (OUTPATIENT)
Dept: INFUSION THERAPY | Facility: HOSPITAL | Age: 69
End: 2024-01-29
Payer: COMMERCIAL

## 2024-01-29 DIAGNOSIS — M00.841: Primary | ICD-10-CM

## 2024-01-29 PROCEDURE — 258N000003 HC RX IP 258 OP 636: Performed by: STUDENT IN AN ORGANIZED HEALTH CARE EDUCATION/TRAINING PROGRAM

## 2024-01-29 PROCEDURE — G0463 HOSPITAL OUTPT CLINIC VISIT: HCPCS

## 2024-01-29 PROCEDURE — 250N000011 HC RX IP 250 OP 636: Performed by: STUDENT IN AN ORGANIZED HEALTH CARE EDUCATION/TRAINING PROGRAM

## 2024-01-29 RX ORDER — HEPARIN SODIUM,PORCINE 10 UNIT/ML
5-20 VIAL (ML) INTRAVENOUS DAILY PRN
Status: CANCELLED | OUTPATIENT
Start: 2024-01-30

## 2024-01-29 RX ORDER — METHYLPREDNISOLONE SODIUM SUCCINATE 125 MG/2ML
125 INJECTION, POWDER, LYOPHILIZED, FOR SOLUTION INTRAMUSCULAR; INTRAVENOUS
Status: CANCELLED
Start: 2024-01-30

## 2024-01-29 RX ORDER — DIPHENHYDRAMINE HYDROCHLORIDE 50 MG/ML
50 INJECTION INTRAMUSCULAR; INTRAVENOUS
Status: CANCELLED
Start: 2024-01-30

## 2024-01-29 RX ORDER — EPINEPHRINE 1 MG/ML
0.3 INJECTION, SOLUTION INTRAMUSCULAR; SUBCUTANEOUS EVERY 5 MIN PRN
Status: CANCELLED | OUTPATIENT
Start: 2024-01-30

## 2024-01-29 RX ORDER — ALBUTEROL SULFATE 90 UG/1
1-2 AEROSOL, METERED RESPIRATORY (INHALATION)
Status: CANCELLED
Start: 2024-01-30

## 2024-01-29 RX ORDER — ALBUTEROL SULFATE 0.83 MG/ML
2.5 SOLUTION RESPIRATORY (INHALATION)
Status: CANCELLED | OUTPATIENT
Start: 2024-01-30

## 2024-01-29 RX ORDER — HEPARIN SODIUM (PORCINE) LOCK FLUSH IV SOLN 100 UNIT/ML 100 UNIT/ML
5 SOLUTION INTRAVENOUS
Status: CANCELLED | OUTPATIENT
Start: 2024-01-30

## 2024-01-29 NOTE — PROGRESS NOTES
"Maurice called [facility] this morning stating that his CADD pump was alarming \"OCCLUSION\" when he was supposed to be receiving his 3:30am dose. He states he had a pump exchange yesterday and the 3:30pm dose \"there were no problems, but the 3:30am dose the pump kept alarming then it would clear for a few seconds then alarm and clear. I called the number (I) and they said I wasn't in their system but then finally it just timed out when the dose would have been done\".   1:00pm Pt arrived to [facility] INF for writer to troubleshoot the CADD pump.  Noted PICC dressing, BioPatch had dried blood on it so site care was completed.   Noted brisk blood return from PICC and it was easily flushed with NS.  Attempted to \"prime\" the CADD pump and as soon as pump was to be priming it was alarming \"occlusion\". Noted many minuscule air bubbles in the tubing within/near the cartridge, also more bubbles/air noted within the antibiotic bag, causing pump to alarm.  Labs will be drawn 1/30/2023.  Pharmacy made a new bag of cefepime to cover the next two doses due. Pump was connected and confirmed to be infusing, watched x5min and no issues while afternoon dose was infusing. New batteries placed in pump, plus extra batteries sent with pt. Settings checked by 2RN's prior to connecting to pt.  Maurice will return tomorrow for regular appt to have new 48hr pump exchange.  Advised pt that if he has any further pump issues during after hours, call [facility] and explain that he is being seen by Outpatient but needs help troubleshooting a CADD pump.  "

## 2024-01-30 ENCOUNTER — INFUSION THERAPY VISIT (OUTPATIENT)
Dept: INFUSION THERAPY | Facility: HOSPITAL | Age: 69
End: 2024-01-30
Payer: COMMERCIAL

## 2024-01-30 VITALS
OXYGEN SATURATION: 96 % | TEMPERATURE: 98.4 F | DIASTOLIC BLOOD PRESSURE: 73 MMHG | HEART RATE: 67 BPM | RESPIRATION RATE: 16 BRPM | SYSTOLIC BLOOD PRESSURE: 132 MMHG

## 2024-01-30 DIAGNOSIS — M00.841: Primary | ICD-10-CM

## 2024-01-30 LAB
ANION GAP SERPL CALCULATED.3IONS-SCNC: 9 MMOL/L (ref 7–15)
BASOPHILS # BLD AUTO: ABNORMAL 10*3/UL
BASOPHILS # BLD MANUAL: 0 10E3/UL (ref 0–0.2)
BASOPHILS NFR BLD AUTO: ABNORMAL %
BASOPHILS NFR BLD MANUAL: 0 %
BUN SERPL-MCNC: 24.3 MG/DL (ref 8–23)
CALCIUM SERPL-MCNC: 9.4 MG/DL (ref 8.8–10.2)
CHLORIDE SERPL-SCNC: 104 MMOL/L (ref 98–107)
CREAT SERPL-MCNC: 1.05 MG/DL (ref 0.67–1.17)
CRP SERPL-MCNC: <3 MG/L
DEPRECATED HCO3 PLAS-SCNC: 27 MMOL/L (ref 22–29)
EGFRCR SERPLBLD CKD-EPI 2021: 77 ML/MIN/1.73M2
EOSINOPHIL # BLD AUTO: ABNORMAL 10*3/UL
EOSINOPHIL # BLD MANUAL: 0 10E3/UL (ref 0–0.7)
EOSINOPHIL NFR BLD AUTO: ABNORMAL %
EOSINOPHIL NFR BLD MANUAL: 0 %
ERYTHROCYTE [DISTWIDTH] IN BLOOD BY AUTOMATED COUNT: 13 % (ref 10–15)
GLUCOSE SERPL-MCNC: 108 MG/DL (ref 70–99)
HCT VFR BLD AUTO: 39.2 % (ref 40–53)
HGB BLD-MCNC: 12.9 G/DL (ref 13.3–17.7)
IMM GRANULOCYTES # BLD: ABNORMAL 10*3/UL
IMM GRANULOCYTES NFR BLD: ABNORMAL %
LYMPHOCYTES # BLD AUTO: ABNORMAL 10*3/UL
LYMPHOCYTES # BLD MANUAL: 0.3 10E3/UL (ref 0.8–5.3)
LYMPHOCYTES NFR BLD AUTO: ABNORMAL %
LYMPHOCYTES NFR BLD MANUAL: 3 %
MCH RBC QN AUTO: 29.7 PG (ref 26.5–33)
MCHC RBC AUTO-ENTMCNC: 32.9 G/DL (ref 31.5–36.5)
MCV RBC AUTO: 90 FL (ref 78–100)
MONOCYTES # BLD AUTO: ABNORMAL 10*3/UL
MONOCYTES # BLD MANUAL: 0.5 10E3/UL (ref 0–1.3)
MONOCYTES NFR BLD AUTO: ABNORMAL %
MONOCYTES NFR BLD MANUAL: 4 %
NEUTROPHILS # BLD AUTO: ABNORMAL 10*3/UL
NEUTROPHILS # BLD MANUAL: 10.5 10E3/UL (ref 1.6–8.3)
NEUTROPHILS NFR BLD AUTO: ABNORMAL %
NEUTROPHILS NFR BLD MANUAL: 93 %
NRBC # BLD AUTO: 0 10E3/UL
NRBC BLD AUTO-RTO: 0 /100
PLAT MORPH BLD: ABNORMAL
PLATELET # BLD AUTO: 122 10E3/UL (ref 150–450)
POTASSIUM SERPL-SCNC: 4.2 MMOL/L (ref 3.4–5.3)
RBC # BLD AUTO: 4.34 10E6/UL (ref 4.4–5.9)
RBC MORPH BLD: ABNORMAL
SODIUM SERPL-SCNC: 140 MMOL/L (ref 135–145)
WBC # BLD AUTO: 11.3 10E3/UL (ref 4–11)

## 2024-01-30 PROCEDURE — 85027 COMPLETE CBC AUTOMATED: CPT

## 2024-01-30 PROCEDURE — 36592 COLLECT BLOOD FROM PICC: CPT

## 2024-01-30 PROCEDURE — 80048 BASIC METABOLIC PNL TOTAL CA: CPT

## 2024-01-30 PROCEDURE — 250N000011 HC RX IP 250 OP 636: Performed by: STUDENT IN AN ORGANIZED HEALTH CARE EDUCATION/TRAINING PROGRAM

## 2024-01-30 PROCEDURE — 96521 REFILL/MAINT PORTABLE PUMP: CPT

## 2024-01-30 PROCEDURE — 86140 C-REACTIVE PROTEIN: CPT

## 2024-01-30 PROCEDURE — 258N000003 HC RX IP 258 OP 636: Performed by: STUDENT IN AN ORGANIZED HEALTH CARE EDUCATION/TRAINING PROGRAM

## 2024-01-30 PROCEDURE — 85007 BL SMEAR W/DIFF WBC COUNT: CPT

## 2024-01-30 RX ORDER — EPINEPHRINE 1 MG/ML
0.3 INJECTION, SOLUTION INTRAMUSCULAR; SUBCUTANEOUS EVERY 5 MIN PRN
Status: DISCONTINUED | OUTPATIENT
Start: 2024-01-30 | End: 2024-01-30 | Stop reason: HOSPADM

## 2024-01-30 RX ORDER — HEPARIN SODIUM,PORCINE 10 UNIT/ML
5-20 VIAL (ML) INTRAVENOUS DAILY PRN
Status: CANCELLED | OUTPATIENT
Start: 2024-02-01

## 2024-01-30 RX ORDER — ALBUTEROL SULFATE 0.83 MG/ML
2.5 SOLUTION RESPIRATORY (INHALATION)
Status: DISCONTINUED | OUTPATIENT
Start: 2024-01-30 | End: 2024-01-30 | Stop reason: HOSPADM

## 2024-01-30 RX ORDER — EPINEPHRINE 1 MG/ML
0.3 INJECTION, SOLUTION INTRAMUSCULAR; SUBCUTANEOUS EVERY 5 MIN PRN
Status: CANCELLED | OUTPATIENT
Start: 2024-02-01

## 2024-01-30 RX ORDER — ALBUTEROL SULFATE 0.83 MG/ML
2.5 SOLUTION RESPIRATORY (INHALATION)
Status: CANCELLED | OUTPATIENT
Start: 2024-02-01

## 2024-01-30 RX ORDER — HEPARIN SODIUM (PORCINE) LOCK FLUSH IV SOLN 100 UNIT/ML 100 UNIT/ML
5 SOLUTION INTRAVENOUS
Status: CANCELLED | OUTPATIENT
Start: 2024-02-01

## 2024-01-30 RX ORDER — ALBUTEROL SULFATE 90 UG/1
1-2 AEROSOL, METERED RESPIRATORY (INHALATION)
Status: DISCONTINUED | OUTPATIENT
Start: 2024-01-30 | End: 2024-01-30 | Stop reason: HOSPADM

## 2024-01-30 RX ORDER — DIPHENHYDRAMINE HYDROCHLORIDE 50 MG/ML
50 INJECTION INTRAMUSCULAR; INTRAVENOUS
Status: CANCELLED
Start: 2024-02-01

## 2024-01-30 RX ORDER — ALBUTEROL SULFATE 90 UG/1
1-2 AEROSOL, METERED RESPIRATORY (INHALATION)
Status: CANCELLED
Start: 2024-02-01

## 2024-01-30 RX ORDER — METHYLPREDNISOLONE SODIUM SUCCINATE 125 MG/2ML
125 INJECTION, POWDER, LYOPHILIZED, FOR SOLUTION INTRAMUSCULAR; INTRAVENOUS
Status: CANCELLED
Start: 2024-02-01

## 2024-01-30 RX ORDER — DIPHENHYDRAMINE HYDROCHLORIDE 50 MG/ML
50 INJECTION INTRAMUSCULAR; INTRAVENOUS
Status: DISCONTINUED | OUTPATIENT
Start: 2024-01-30 | End: 2024-01-30 | Stop reason: HOSPADM

## 2024-01-30 RX ORDER — METHYLPREDNISOLONE SODIUM SUCCINATE 125 MG/2ML
125 INJECTION, POWDER, LYOPHILIZED, FOR SOLUTION INTRAMUSCULAR; INTRAVENOUS
Status: DISCONTINUED | OUTPATIENT
Start: 2024-01-30 | End: 2024-01-30 | Stop reason: HOSPADM

## 2024-01-30 RX ADMIN — CEFEPIME HYDROCHLORIDE 8 G: 2 INJECTION, POWDER, FOR SOLUTION INTRAVENOUS at 13:10

## 2024-01-30 NOTE — PROGRESS NOTES
Infusion Nursing Note:  Maurice Biswas presents today for an antibiotic pump exchange.    Patient seen by provider today: No   present during visit today: Not Applicable.    Note: Maurice comes to infusion ambulatory and in stable condition. Here for an antibiotic pump exchange. States the pump was beeped x1 overnight after infusion was done because volume was low, otherwise no other issues. PICC dressing was changed yesterday. I did draw labs. The antibiotic bag was switched out, batteries to the CADD pump changed, and verified the pump was set to the correct settings. Left infusion ambulatory and in stable condition. Maurice will be back at [facility] infusion on Thursday.     /73 (BP Location: Left arm, Patient Position: Sitting)   Pulse 67   Temp 98.4  F (36.9  C) (Oral)   Resp 16   SpO2 96%     Intravenous Access:  PICC.    Treatment Conditions:  Not Applicable.    Post Infusion Assessment:  Patient has been tolerating the antibiotic without incident.     Discharge Plan:   Discharge instructions reviewed with: Patient.  Patient and/or family verbalized understanding of discharge instructions and all questions answered.  AVS to patient via [employer].  Patient will return on Friday.   Patient discharged in stable condition accompanied by: wife.  Departure Mode: Ambulatory.    Bonny Escamilla RN

## 2024-02-01 ENCOUNTER — INFUSION THERAPY VISIT (OUTPATIENT)
Dept: INFUSION THERAPY | Facility: HOSPITAL | Age: 69
End: 2024-02-01
Payer: COMMERCIAL

## 2024-02-01 VITALS
TEMPERATURE: 98 F | DIASTOLIC BLOOD PRESSURE: 80 MMHG | OXYGEN SATURATION: 97 % | RESPIRATION RATE: 16 BRPM | HEART RATE: 73 BPM | SYSTOLIC BLOOD PRESSURE: 131 MMHG

## 2024-02-01 DIAGNOSIS — M00.841: Primary | ICD-10-CM

## 2024-02-01 PROCEDURE — 258N000003 HC RX IP 258 OP 636: Performed by: STUDENT IN AN ORGANIZED HEALTH CARE EDUCATION/TRAINING PROGRAM

## 2024-02-01 PROCEDURE — 250N000011 HC RX IP 250 OP 636: Performed by: STUDENT IN AN ORGANIZED HEALTH CARE EDUCATION/TRAINING PROGRAM

## 2024-02-01 PROCEDURE — 96521 REFILL/MAINT PORTABLE PUMP: CPT

## 2024-02-01 RX ORDER — EPINEPHRINE 1 MG/ML
0.3 INJECTION, SOLUTION INTRAMUSCULAR; SUBCUTANEOUS EVERY 5 MIN PRN
Status: CANCELLED | OUTPATIENT
Start: 2024-02-03

## 2024-02-01 RX ORDER — EPINEPHRINE 1 MG/ML
0.3 INJECTION, SOLUTION INTRAMUSCULAR; SUBCUTANEOUS EVERY 5 MIN PRN
Status: DISCONTINUED | OUTPATIENT
Start: 2024-02-01 | End: 2024-02-01 | Stop reason: HOSPADM

## 2024-02-01 RX ORDER — HEPARIN SODIUM (PORCINE) LOCK FLUSH IV SOLN 100 UNIT/ML 100 UNIT/ML
5 SOLUTION INTRAVENOUS
Status: CANCELLED | OUTPATIENT
Start: 2024-02-03

## 2024-02-01 RX ORDER — DIPHENHYDRAMINE HYDROCHLORIDE 50 MG/ML
50 INJECTION INTRAMUSCULAR; INTRAVENOUS
Status: CANCELLED
Start: 2024-02-03

## 2024-02-01 RX ORDER — METHYLPREDNISOLONE SODIUM SUCCINATE 125 MG/2ML
125 INJECTION, POWDER, LYOPHILIZED, FOR SOLUTION INTRAMUSCULAR; INTRAVENOUS
Status: DISCONTINUED | OUTPATIENT
Start: 2024-02-01 | End: 2024-02-01 | Stop reason: HOSPADM

## 2024-02-01 RX ORDER — DIPHENHYDRAMINE HYDROCHLORIDE 50 MG/ML
50 INJECTION INTRAMUSCULAR; INTRAVENOUS
Status: DISCONTINUED | OUTPATIENT
Start: 2024-02-01 | End: 2024-02-01 | Stop reason: HOSPADM

## 2024-02-01 RX ORDER — HEPARIN SODIUM,PORCINE 10 UNIT/ML
5-20 VIAL (ML) INTRAVENOUS DAILY PRN
Status: CANCELLED | OUTPATIENT
Start: 2024-02-03

## 2024-02-01 RX ORDER — ALBUTEROL SULFATE 0.83 MG/ML
2.5 SOLUTION RESPIRATORY (INHALATION)
Status: CANCELLED | OUTPATIENT
Start: 2024-02-03

## 2024-02-01 RX ORDER — METHYLPREDNISOLONE SODIUM SUCCINATE 125 MG/2ML
125 INJECTION, POWDER, LYOPHILIZED, FOR SOLUTION INTRAMUSCULAR; INTRAVENOUS
Status: CANCELLED
Start: 2024-02-03

## 2024-02-01 RX ORDER — ALBUTEROL SULFATE 90 UG/1
1-2 AEROSOL, METERED RESPIRATORY (INHALATION)
Status: CANCELLED
Start: 2024-02-03

## 2024-02-01 RX ORDER — ALBUTEROL SULFATE 90 UG/1
1-2 AEROSOL, METERED RESPIRATORY (INHALATION)
Status: DISCONTINUED | OUTPATIENT
Start: 2024-02-01 | End: 2024-02-01 | Stop reason: HOSPADM

## 2024-02-01 RX ORDER — ALBUTEROL SULFATE 0.83 MG/ML
2.5 SOLUTION RESPIRATORY (INHALATION)
Status: DISCONTINUED | OUTPATIENT
Start: 2024-02-01 | End: 2024-02-01 | Stop reason: HOSPADM

## 2024-02-01 RX ADMIN — CEFEPIME HYDROCHLORIDE 8 G: 2 INJECTION, POWDER, FOR SOLUTION INTRAVENOUS at 14:05

## 2024-02-01 ASSESSMENT — PAIN SCALES - GENERAL: PAINLEVEL: NO PAIN (0)

## 2024-02-01 NOTE — PROGRESS NOTES
Infusion Nursing Note:  Maurice Biswas presents today for Maxipime pumpexchange (Q 48 hrs)   Patient seen by provider today: No   present during visit today: Not Applicable.    Note: Muarice arrived into the infusion center accompanied by his wife in a stable condition for antibiotic pump exchange,  new antibiotic bag switched out, batteries to the CADD pump changed and pump  settings verified. Maurice left infusion ambulatory in stable condition. He will go to the [facility] over the weekend, and will be back at [facility] on Monday.    Intravenous Access:  PICC.    Treatment Conditions:  Not Applicable.    Post Infusion Assessment:  Blood return noted pre and post infusion.  Site patent and intact, free from redness, edema or discomfort.  No evidence of extravasations.     Discharge Plan:   Discharge instructions reviewed with: Patient and Family.  Patient and/or family verbalized understanding of discharge instructions and all questions answered.  Patient discharged in stable condition accompanied by: wife.  Departure Mode: Ambulatory.    Noelle Fraire RN

## 2024-02-03 ENCOUNTER — INFUSION THERAPY VISIT (OUTPATIENT)
Dept: ONCOLOGY | Facility: CLINIC | Age: 69
End: 2024-02-03
Attending: INTERNAL MEDICINE
Payer: COMMERCIAL

## 2024-02-03 VITALS
HEART RATE: 80 BPM | SYSTOLIC BLOOD PRESSURE: 154 MMHG | DIASTOLIC BLOOD PRESSURE: 85 MMHG | OXYGEN SATURATION: 98 % | RESPIRATION RATE: 20 BRPM | TEMPERATURE: 97.6 F

## 2024-02-03 DIAGNOSIS — M00.841: Primary | ICD-10-CM

## 2024-02-03 PROCEDURE — 96521 REFILL/MAINT PORTABLE PUMP: CPT

## 2024-02-03 PROCEDURE — 250N000011 HC RX IP 250 OP 636: Mod: JZ | Performed by: STUDENT IN AN ORGANIZED HEALTH CARE EDUCATION/TRAINING PROGRAM

## 2024-02-03 PROCEDURE — 258N000003 HC RX IP 258 OP 636: Performed by: STUDENT IN AN ORGANIZED HEALTH CARE EDUCATION/TRAINING PROGRAM

## 2024-02-03 RX ORDER — HEPARIN SODIUM (PORCINE) LOCK FLUSH IV SOLN 100 UNIT/ML 100 UNIT/ML
5 SOLUTION INTRAVENOUS
Status: CANCELLED | OUTPATIENT
Start: 2024-02-05

## 2024-02-03 RX ORDER — DIPHENHYDRAMINE HYDROCHLORIDE 50 MG/ML
50 INJECTION INTRAMUSCULAR; INTRAVENOUS
Status: CANCELLED
Start: 2024-02-05

## 2024-02-03 RX ORDER — HEPARIN SODIUM,PORCINE 10 UNIT/ML
5-20 VIAL (ML) INTRAVENOUS DAILY PRN
Status: CANCELLED | OUTPATIENT
Start: 2024-02-05

## 2024-02-03 RX ORDER — ALBUTEROL SULFATE 0.83 MG/ML
2.5 SOLUTION RESPIRATORY (INHALATION)
Status: CANCELLED | OUTPATIENT
Start: 2024-02-05

## 2024-02-03 RX ORDER — METHYLPREDNISOLONE SODIUM SUCCINATE 125 MG/2ML
125 INJECTION, POWDER, LYOPHILIZED, FOR SOLUTION INTRAMUSCULAR; INTRAVENOUS
Status: CANCELLED
Start: 2024-02-05

## 2024-02-03 RX ORDER — ALBUTEROL SULFATE 90 UG/1
1-2 AEROSOL, METERED RESPIRATORY (INHALATION)
Status: CANCELLED
Start: 2024-02-05

## 2024-02-03 RX ORDER — EPINEPHRINE 1 MG/ML
0.3 INJECTION, SOLUTION INTRAMUSCULAR; SUBCUTANEOUS EVERY 5 MIN PRN
Status: CANCELLED | OUTPATIENT
Start: 2024-02-05

## 2024-02-03 RX ADMIN — CEFEPIME 8 G: 2 INJECTION, POWDER, FOR SOLUTION INTRAVENOUS at 13:38

## 2024-02-03 ASSESSMENT — PAIN SCALES - GENERAL: PAINLEVEL: NO PAIN (0)

## 2024-02-03 NOTE — PROGRESS NOTES
Infusion Nursing Note:  Maurice Biswas presents today for Cefepime pump exchange.    Patient seen by provider today: No    Note: Patient presents to clinic today feeling well with no questions.  Pt did not request or require any intervention for pain today.    Intravenous Access:  PICC.    Treatment Conditions:  Not Applicable.    Post Infusion Assessment:  Patient tolerated infusion without incident.  Blood return noted post and pre infusion.  Site patent and intact, free from redness, edema or discomfort.  No evidence of extravasations.    Discharge Plan:   Patient declined prescription refills.  Discharge instructions reviewed with: Patient.  Patient and/or family verbalized understanding of discharge instructions and all questions answered.  AVS to patient via HTP.  Patient will return 2/5/2024 for next appointment.   Patient discharged in stable condition accompanied by: self.  Departure Mode: Ambulatory.    Karina Moncada RN

## 2024-02-03 NOTE — PATIENT INSTRUCTIONS
Contact Numbers    [facility] Main Line/TRIAGE: [phone]    Call with chills and/or temperature greater than or equal to 100.5, uncontrolled nausea/vomiting, diarrhea, constipation, dizziness, shortness of breath, chest pain, bleeding, unexplained bruising, or any new/concerning symptoms, questions/concerns.     If you are having any concerning symptoms or wish to speak to a provider before your next infusion visit, please call your care coordinator or triage to notify them so we can adequately serve you.       After Hours: [phone]    If after hours, weekends, or holidays, call main hospital  and ask for Oncology doctor on call.      February 2024 Sunday Monday Tuesday Wednesday Thursday Friday Saturday                       1    INFUSION 1.5 HR (90 MIN)  12:15 PM   (90 min.)   [facility] INFUSION CHAIR   [facility] 2     3    ONC INFUSION 0.5 HR (30 MIN)   1:00 PM   (30 min.)    ONC INFUSION NURSE   [facility]   4     5    INFUSION 1.5 HR (90 MIN)  12:15 PM   (90 min.)   [facility] INFUSION CHAIR   [facility] 6     7    INFUSION 1.5 HR (90 MIN)  12:15 PM   (90 min.)   [facility] INFUSION CHAIR   [facility] 8     9    INFUSION 1.5 HR (90 MIN)  12:15 PM   (90 min.)   [facility] INFUSION CHAIR   [facility] 10       11    ONC INFUSION 0.5 HR (30 MIN)   1:00 PM   (30 min.)    ONC INFUSION NURSE   [facility] 12     13    RETURN INFECTIOUS DISEASE  10:45 AM   (20 min.)   Braulio Granados MD   [facility]    INFUSION 1.5 HR (90 MIN)  12:15 PM   (90 min.)   [facility] INFUSION CHAIR   [facility] 14     15     16     17       18     19     20     21     22     23     24       25     26     27     28     29 March 2024 Sunday Monday Tuesday Wednesday Thursday Friday Saturday                             1     2       3     4     5     6     7     8     9       10     11     12     13     14     15     16       17     18     19     20     21     22     23       24     25     26     27     28     29     30       31                                                   Lab Results:  No results found for this or any previous visit (from the past 12 hour(s)).

## 2024-02-05 ENCOUNTER — INFUSION THERAPY VISIT (OUTPATIENT)
Dept: INFUSION THERAPY | Facility: HOSPITAL | Age: 69
End: 2024-02-05
Payer: COMMERCIAL

## 2024-02-05 DIAGNOSIS — M00.841: Primary | ICD-10-CM

## 2024-02-05 PROCEDURE — 250N000011 HC RX IP 250 OP 636: Performed by: STUDENT IN AN ORGANIZED HEALTH CARE EDUCATION/TRAINING PROGRAM

## 2024-02-05 PROCEDURE — 258N000003 HC RX IP 258 OP 636: Performed by: STUDENT IN AN ORGANIZED HEALTH CARE EDUCATION/TRAINING PROGRAM

## 2024-02-05 PROCEDURE — 96521 REFILL/MAINT PORTABLE PUMP: CPT

## 2024-02-05 RX ORDER — HEPARIN SODIUM (PORCINE) LOCK FLUSH IV SOLN 100 UNIT/ML 100 UNIT/ML
5 SOLUTION INTRAVENOUS
Status: CANCELLED | OUTPATIENT
Start: 2024-02-07

## 2024-02-05 RX ORDER — EPINEPHRINE 1 MG/ML
0.3 INJECTION, SOLUTION INTRAMUSCULAR; SUBCUTANEOUS EVERY 5 MIN PRN
Status: CANCELLED | OUTPATIENT
Start: 2024-02-07

## 2024-02-05 RX ORDER — ALBUTEROL SULFATE 90 UG/1
1-2 AEROSOL, METERED RESPIRATORY (INHALATION)
Status: DISCONTINUED | OUTPATIENT
Start: 2024-02-05 | End: 2024-02-05 | Stop reason: HOSPADM

## 2024-02-05 RX ORDER — METHYLPREDNISOLONE SODIUM SUCCINATE 125 MG/2ML
125 INJECTION, POWDER, LYOPHILIZED, FOR SOLUTION INTRAMUSCULAR; INTRAVENOUS
Status: DISCONTINUED | OUTPATIENT
Start: 2024-02-05 | End: 2024-02-05 | Stop reason: HOSPADM

## 2024-02-05 RX ORDER — METHYLPREDNISOLONE SODIUM SUCCINATE 125 MG/2ML
125 INJECTION, POWDER, LYOPHILIZED, FOR SOLUTION INTRAMUSCULAR; INTRAVENOUS
Status: CANCELLED
Start: 2024-02-07

## 2024-02-05 RX ORDER — DIPHENHYDRAMINE HYDROCHLORIDE 50 MG/ML
50 INJECTION INTRAMUSCULAR; INTRAVENOUS
Status: DISCONTINUED | OUTPATIENT
Start: 2024-02-05 | End: 2024-02-05 | Stop reason: HOSPADM

## 2024-02-05 RX ORDER — EPINEPHRINE 1 MG/ML
0.3 INJECTION, SOLUTION INTRAMUSCULAR; SUBCUTANEOUS EVERY 5 MIN PRN
Status: DISCONTINUED | OUTPATIENT
Start: 2024-02-05 | End: 2024-02-05 | Stop reason: HOSPADM

## 2024-02-05 RX ORDER — ALBUTEROL SULFATE 0.83 MG/ML
2.5 SOLUTION RESPIRATORY (INHALATION)
Status: CANCELLED | OUTPATIENT
Start: 2024-02-07

## 2024-02-05 RX ORDER — ALBUTEROL SULFATE 0.83 MG/ML
2.5 SOLUTION RESPIRATORY (INHALATION)
Status: DISCONTINUED | OUTPATIENT
Start: 2024-02-05 | End: 2024-02-05 | Stop reason: HOSPADM

## 2024-02-05 RX ORDER — ALBUTEROL SULFATE 90 UG/1
1-2 AEROSOL, METERED RESPIRATORY (INHALATION)
Status: CANCELLED
Start: 2024-02-07

## 2024-02-05 RX ORDER — HEPARIN SODIUM,PORCINE 10 UNIT/ML
5-20 VIAL (ML) INTRAVENOUS DAILY PRN
Status: CANCELLED | OUTPATIENT
Start: 2024-02-07

## 2024-02-05 RX ORDER — DIPHENHYDRAMINE HYDROCHLORIDE 50 MG/ML
50 INJECTION INTRAMUSCULAR; INTRAVENOUS
Status: CANCELLED
Start: 2024-02-07

## 2024-02-05 RX ADMIN — CEFEPIME HYDROCHLORIDE 8 G: 2 INJECTION, POWDER, FOR SOLUTION INTRAVENOUS at 13:37

## 2024-02-05 NOTE — PROGRESS NOTES
Infusion Nursing Note:  Maurice Biswas presents today for Maxipime pumpexchange (Q 48 hrs)   Patient seen by provider today: No   present during visit today: Not Applicable.    Note: Maurice arrived into the infusion center accompanied by his wife in a stable condition for antibiotic pump exchange,  new antibiotic bag switched out, batteries to the CADD pump changed and pump  settings verified. Maurice left infusion ambulatory in stable condition. and will be back at [facility] infusion on Wednesday. PICC dressing change done today.    Intravenous Access:  PICC.    Treatment Conditions:  Not Applicable.    Post Infusion Assessment:  Blood return noted pre and post infusion.  Site patent and intact, free from redness, edema or discomfort.  No evidence of extravasations.     Discharge Plan:   Discharge instructions reviewed with: Patient and Family.  Patient and/or family verbalized understanding of discharge instructions and all questions answered.  Patient discharged in stable condition accompanied by: wife.  Departure Mode: Ambulatory.    Noelle Fraire RN

## 2024-02-07 ENCOUNTER — INFUSION THERAPY VISIT (OUTPATIENT)
Dept: INFUSION THERAPY | Facility: HOSPITAL | Age: 69
End: 2024-02-07
Payer: COMMERCIAL

## 2024-02-07 VITALS
TEMPERATURE: 97.7 F | RESPIRATION RATE: 16 BRPM | SYSTOLIC BLOOD PRESSURE: 130 MMHG | HEART RATE: 78 BPM | OXYGEN SATURATION: 98 % | DIASTOLIC BLOOD PRESSURE: 74 MMHG

## 2024-02-07 DIAGNOSIS — M00.841: Primary | ICD-10-CM

## 2024-02-07 PROCEDURE — 250N000011 HC RX IP 250 OP 636: Performed by: STUDENT IN AN ORGANIZED HEALTH CARE EDUCATION/TRAINING PROGRAM

## 2024-02-07 PROCEDURE — 96521 REFILL/MAINT PORTABLE PUMP: CPT

## 2024-02-07 PROCEDURE — 258N000003 HC RX IP 258 OP 636: Performed by: STUDENT IN AN ORGANIZED HEALTH CARE EDUCATION/TRAINING PROGRAM

## 2024-02-07 RX ORDER — HEPARIN SODIUM,PORCINE 10 UNIT/ML
5-20 VIAL (ML) INTRAVENOUS DAILY PRN
Status: CANCELLED | OUTPATIENT
Start: 2024-02-09

## 2024-02-07 RX ORDER — DIPHENHYDRAMINE HYDROCHLORIDE 50 MG/ML
50 INJECTION INTRAMUSCULAR; INTRAVENOUS
Status: CANCELLED
Start: 2024-02-09

## 2024-02-07 RX ORDER — HEPARIN SODIUM (PORCINE) LOCK FLUSH IV SOLN 100 UNIT/ML 100 UNIT/ML
5 SOLUTION INTRAVENOUS
Status: CANCELLED | OUTPATIENT
Start: 2024-02-09

## 2024-02-07 RX ORDER — ALBUTEROL SULFATE 90 UG/1
1-2 AEROSOL, METERED RESPIRATORY (INHALATION)
Status: CANCELLED
Start: 2024-02-09

## 2024-02-07 RX ORDER — EPINEPHRINE 1 MG/ML
0.3 INJECTION, SOLUTION INTRAMUSCULAR; SUBCUTANEOUS EVERY 5 MIN PRN
Status: CANCELLED | OUTPATIENT
Start: 2024-02-09

## 2024-02-07 RX ORDER — ALBUTEROL SULFATE 0.83 MG/ML
2.5 SOLUTION RESPIRATORY (INHALATION)
Status: CANCELLED | OUTPATIENT
Start: 2024-02-09

## 2024-02-07 RX ORDER — METHYLPREDNISOLONE SODIUM SUCCINATE 125 MG/2ML
125 INJECTION, POWDER, LYOPHILIZED, FOR SOLUTION INTRAMUSCULAR; INTRAVENOUS
Status: CANCELLED
Start: 2024-02-09

## 2024-02-07 RX ADMIN — CEFEPIME HYDROCHLORIDE 8 G: 2 INJECTION, POWDER, FOR SOLUTION INTRAVENOUS at 13:09

## 2024-02-07 NOTE — PROGRESS NOTES
Infusion Nursing Note:  Maurice Biswas presents today for CADD pump refill.    Patient seen by provider today: No   present during visit today: Not Applicable.    Note: Pt voices no new concerns today.  Disconnected antibiotic, flushed PICC line w/good blood return noted.  New bag of antibiotic started with pump double-check.      Intravenous Access:  PICC.    Treatment Conditions:  Not Applicable.      Post Infusion Assessment:  Patient tolerated infusion without incident.       Discharge Plan:   Patient discharged in stable condition accompanied by: wife.  Departure Mode: Ambulatory.      Oneida Coppola RN

## 2024-02-09 ENCOUNTER — INFUSION THERAPY VISIT (OUTPATIENT)
Dept: INFUSION THERAPY | Facility: HOSPITAL | Age: 69
End: 2024-02-09
Payer: COMMERCIAL

## 2024-02-09 VITALS
HEART RATE: 76 BPM | TEMPERATURE: 97.9 F | OXYGEN SATURATION: 96 % | RESPIRATION RATE: 16 BRPM | SYSTOLIC BLOOD PRESSURE: 126 MMHG | DIASTOLIC BLOOD PRESSURE: 75 MMHG

## 2024-02-09 DIAGNOSIS — M00.841: Primary | ICD-10-CM

## 2024-02-09 LAB
ANION GAP SERPL CALCULATED.3IONS-SCNC: 10 MMOL/L (ref 7–15)
BASOPHILS # BLD AUTO: 0 10E3/UL (ref 0–0.2)
BASOPHILS NFR BLD AUTO: 0 %
BUN SERPL-MCNC: 19.2 MG/DL (ref 8–23)
CALCIUM SERPL-MCNC: 9.3 MG/DL (ref 8.8–10.2)
CHLORIDE SERPL-SCNC: 105 MMOL/L (ref 98–107)
CREAT SERPL-MCNC: 1.09 MG/DL (ref 0.67–1.17)
CRP SERPL-MCNC: <3 MG/L
DEPRECATED HCO3 PLAS-SCNC: 26 MMOL/L (ref 22–29)
EGFRCR SERPLBLD CKD-EPI 2021: 73 ML/MIN/1.73M2
EOSINOPHIL # BLD AUTO: 0 10E3/UL (ref 0–0.7)
EOSINOPHIL NFR BLD AUTO: 0 %
ERYTHROCYTE [DISTWIDTH] IN BLOOD BY AUTOMATED COUNT: 13 % (ref 10–15)
GLUCOSE SERPL-MCNC: 92 MG/DL (ref 70–99)
HCT VFR BLD AUTO: 37.9 % (ref 40–53)
HGB BLD-MCNC: 12.3 G/DL (ref 13.3–17.7)
IMM GRANULOCYTES # BLD: 0.1 10E3/UL
IMM GRANULOCYTES NFR BLD: 1 %
LYMPHOCYTES # BLD AUTO: 0.5 10E3/UL (ref 0.8–5.3)
LYMPHOCYTES NFR BLD AUTO: 5 %
MCH RBC QN AUTO: 29.2 PG (ref 26.5–33)
MCHC RBC AUTO-ENTMCNC: 32.5 G/DL (ref 31.5–36.5)
MCV RBC AUTO: 90 FL (ref 78–100)
MONOCYTES # BLD AUTO: 0.3 10E3/UL (ref 0–1.3)
MONOCYTES NFR BLD AUTO: 3 %
NEUTROPHILS # BLD AUTO: 9.1 10E3/UL (ref 1.6–8.3)
NEUTROPHILS NFR BLD AUTO: 91 %
NRBC # BLD AUTO: 0 10E3/UL
NRBC BLD AUTO-RTO: 0 /100
PLATELET # BLD AUTO: 120 10E3/UL (ref 150–450)
POTASSIUM SERPL-SCNC: 3.8 MMOL/L (ref 3.4–5.3)
RBC # BLD AUTO: 4.21 10E6/UL (ref 4.4–5.9)
SODIUM SERPL-SCNC: 141 MMOL/L (ref 135–145)
WBC # BLD AUTO: 10 10E3/UL (ref 4–11)

## 2024-02-09 PROCEDURE — 36415 COLL VENOUS BLD VENIPUNCTURE: CPT

## 2024-02-09 PROCEDURE — 96521 REFILL/MAINT PORTABLE PUMP: CPT

## 2024-02-09 PROCEDURE — 258N000003 HC RX IP 258 OP 636: Performed by: STUDENT IN AN ORGANIZED HEALTH CARE EDUCATION/TRAINING PROGRAM

## 2024-02-09 PROCEDURE — 80048 BASIC METABOLIC PNL TOTAL CA: CPT

## 2024-02-09 PROCEDURE — 86140 C-REACTIVE PROTEIN: CPT

## 2024-02-09 PROCEDURE — 85025 COMPLETE CBC W/AUTO DIFF WBC: CPT

## 2024-02-09 PROCEDURE — 250N000011 HC RX IP 250 OP 636: Performed by: STUDENT IN AN ORGANIZED HEALTH CARE EDUCATION/TRAINING PROGRAM

## 2024-02-09 RX ORDER — EPINEPHRINE 1 MG/ML
0.3 INJECTION, SOLUTION INTRAMUSCULAR; SUBCUTANEOUS EVERY 5 MIN PRN
Status: CANCELLED | OUTPATIENT
Start: 2024-02-11

## 2024-02-09 RX ORDER — ALBUTEROL SULFATE 0.83 MG/ML
2.5 SOLUTION RESPIRATORY (INHALATION)
Status: CANCELLED | OUTPATIENT
Start: 2024-02-11

## 2024-02-09 RX ORDER — METHYLPREDNISOLONE SODIUM SUCCINATE 125 MG/2ML
125 INJECTION, POWDER, LYOPHILIZED, FOR SOLUTION INTRAMUSCULAR; INTRAVENOUS
Status: CANCELLED
Start: 2024-02-11

## 2024-02-09 RX ORDER — DIPHENHYDRAMINE HYDROCHLORIDE 50 MG/ML
50 INJECTION INTRAMUSCULAR; INTRAVENOUS
Status: CANCELLED
Start: 2024-02-11

## 2024-02-09 RX ORDER — ALBUTEROL SULFATE 90 UG/1
1-2 AEROSOL, METERED RESPIRATORY (INHALATION)
Status: CANCELLED
Start: 2024-02-11

## 2024-02-09 RX ORDER — HEPARIN SODIUM (PORCINE) LOCK FLUSH IV SOLN 100 UNIT/ML 100 UNIT/ML
5 SOLUTION INTRAVENOUS
Status: CANCELLED | OUTPATIENT
Start: 2024-02-11

## 2024-02-09 RX ORDER — HEPARIN SODIUM,PORCINE 10 UNIT/ML
5-20 VIAL (ML) INTRAVENOUS DAILY PRN
Status: CANCELLED | OUTPATIENT
Start: 2024-02-11

## 2024-02-09 RX ADMIN — CEFEPIME HYDROCHLORIDE 8 G: 2 INJECTION, POWDER, FOR SOLUTION INTRAVENOUS at 13:56

## 2024-02-09 NOTE — PROGRESS NOTES
Infusion Nursing Note:  Maurice Biswas presents today for Maxipime pump exchange.    Patient seen by provider today: No   present during visit today: Not Applicable.    Note: Maurice arrived ambulatory in stable condition. Weekly labs were drawn from PICC line. PICC line intact with initially no blood return but after several NS flushes and repositioning able to get blood return. Maurice denies any issues with the pump at home. Arrived connected to pump with an empty bag of antibiotic. New bag of Maxipime replaced with next dose due at 1400 today. Maurice verbalized understanding to go to the [facility] infusion clinic on Sunday 2/11/24 for his next dose.       Intravenous Access:  Labs drawn without difficulty.  PICC.    Treatment Conditions:  Lab Results   Component Value Date    HGB 12.3 (L) 02/09/2024    WBC 10.0 02/09/2024    ANEU 10.5 (H) 01/30/2024    ANEUTAUTO 9.1 (H) 02/09/2024     (L) 02/09/2024        Lab Results   Component Value Date     02/09/2024    POTASSIUM 3.8 02/09/2024    CR 1.09 02/09/2024    SARAH 9.3 02/09/2024    BILITOTAL 1.1 08/30/2023    ALBUMIN 4.5 08/30/2023    ALT 49 08/30/2023    AST 28 08/30/2023         Post Infusion Assessment:  Site patent and intact, free from redness, edema or discomfort.       Discharge Plan:   Patient discharged in stable condition accompanied by: wife.  Departure Mode: Ambulatory.      Ivon Adler RN

## 2024-02-11 ENCOUNTER — INFUSION THERAPY VISIT (OUTPATIENT)
Dept: ONCOLOGY | Facility: CLINIC | Age: 69
End: 2024-02-11
Attending: INTERNAL MEDICINE
Payer: COMMERCIAL

## 2024-02-11 VITALS
TEMPERATURE: 98.2 F | SYSTOLIC BLOOD PRESSURE: 158 MMHG | HEART RATE: 76 BPM | RESPIRATION RATE: 16 BRPM | DIASTOLIC BLOOD PRESSURE: 83 MMHG | OXYGEN SATURATION: 97 %

## 2024-02-11 DIAGNOSIS — M00.841: Primary | ICD-10-CM

## 2024-02-11 PROCEDURE — 258N000003 HC RX IP 258 OP 636: Performed by: STUDENT IN AN ORGANIZED HEALTH CARE EDUCATION/TRAINING PROGRAM

## 2024-02-11 PROCEDURE — 250N000011 HC RX IP 250 OP 636: Mod: JZ | Performed by: STUDENT IN AN ORGANIZED HEALTH CARE EDUCATION/TRAINING PROGRAM

## 2024-02-11 PROCEDURE — 96521 REFILL/MAINT PORTABLE PUMP: CPT

## 2024-02-11 PROCEDURE — 96374 THER/PROPH/DIAG INJ IV PUSH: CPT

## 2024-02-11 RX ORDER — METHYLPREDNISOLONE SODIUM SUCCINATE 125 MG/2ML
125 INJECTION, POWDER, LYOPHILIZED, FOR SOLUTION INTRAMUSCULAR; INTRAVENOUS
Status: CANCELLED
Start: 2024-02-13

## 2024-02-11 RX ORDER — DIPHENHYDRAMINE HYDROCHLORIDE 50 MG/ML
50 INJECTION INTRAMUSCULAR; INTRAVENOUS
Status: CANCELLED
Start: 2024-02-13

## 2024-02-11 RX ORDER — ALBUTEROL SULFATE 0.83 MG/ML
2.5 SOLUTION RESPIRATORY (INHALATION)
Status: CANCELLED | OUTPATIENT
Start: 2024-02-13

## 2024-02-11 RX ORDER — HEPARIN SODIUM,PORCINE 10 UNIT/ML
5-20 VIAL (ML) INTRAVENOUS DAILY PRN
OUTPATIENT
Start: 2024-02-13

## 2024-02-11 RX ORDER — ALBUTEROL SULFATE 90 UG/1
1-2 AEROSOL, METERED RESPIRATORY (INHALATION)
Status: CANCELLED
Start: 2024-02-13

## 2024-02-11 RX ORDER — EPINEPHRINE 1 MG/ML
0.3 INJECTION, SOLUTION INTRAMUSCULAR; SUBCUTANEOUS EVERY 5 MIN PRN
Status: CANCELLED | OUTPATIENT
Start: 2024-02-13

## 2024-02-11 RX ORDER — HEPARIN SODIUM (PORCINE) LOCK FLUSH IV SOLN 100 UNIT/ML 100 UNIT/ML
5 SOLUTION INTRAVENOUS
OUTPATIENT
Start: 2024-02-13

## 2024-02-11 RX ADMIN — CEFEPIME 8 G: 2 INJECTION, POWDER, FOR SOLUTION INTRAVENOUS at 14:25

## 2024-02-11 NOTE — PROGRESS NOTES
"Infusion Nursing Note:  Maurice Biswas presents today for Maxipime bag exchange.    Patient seen by provider today: No   present during visit today: Not Applicable.    Note: Patient presents to infusion feeling ok. Patient denies acute discomfort and states no acute complaints or concerns needing to be addressed today. Specifically, pt denies s/s of infection such as fever, sore throat, cough, chest pain, shortness of breath, body aches, chills, headache, increased nasal congestion, or changes in taste/smell. But does state he has about 6-7 loose, not watery, stools per day. Pt denies nausea/vomiting or abdominal cramping and voices understanding to notify provider if any GI issues arise do to increased risk of Cdiff.    Intravenous Access:  PICC-dressing changed    Treatment Conditions:  Weekly labs drawn on 2/9/24.   Not Applicable.      Post Infusion Assessment:  Patient tolerated infusion without incident.  Blood return noted pre and post infusion.  Site patent and intact, free from redness, edema or discomfort.  No evidence of extravasations.     Prior to discharge: PICC is secured in place with securement device and IV 3000 and flushed with 10cc NS with positive blood return noted.  Continuous home infusion  CADD connected.    All connectors secured in place, clamps taped open, the following pump settings reviewed: Dose: 2g every 12 hours Bag Length: 48 hours, Dose Volume: 95.4 mL, Dose Duration: 30 minutes, Total Volume: 400 mL, TKO rate: 0.4 mL/hr all verified with Anais Ellis RN  Pump started, \"running\" noted on display (CADD): YES. Next dose set to infuse today at 1430 then 0230.  Patient instructed to call our clinic or [facility] with any questions or concerns at home.  Patient verbalized understanding.    Patient set up for pump bag exchange at [facility] on 2/13 at 1230.  Appointment verified by scheduling   CADD pump maintenance  education reviewed: Assess tubing for " any kinks, notify [facility] of any alarms or leaks.        Discharge Plan:   Patient declined prescription refills.  Discharge instructions reviewed with: Patient.  Patient and/or family verbalized understanding of discharge instructions and all questions answered.  AVS to patient via [employer].  Patient will return 2/13 for next provider appointment and bag exchange   Patient discharged in stable condition accompanied by: wife.  Departure Mode: Ambulatory.      Carlos Manuel Cardoza RN

## 2024-02-11 NOTE — PATIENT INSTRUCTIONS
[facility] Triage and after hours / weekends / holidays:  [phone]    Please call the triage or after hours line if you experience a temperature greater than or equal to 100.4, shaking chills, have uncontrolled nausea, vomiting and/or diarrhea, dizziness, shortness of breath, chest pain, bleeding, unexplained bruising, or if you have any other new/concerning symptoms, questions or concerns.      If you are having any concerning symptoms or wish to speak to a provider before your next infusion visit, please call triage to notify them so we can adequately serve you.     If you need a refill on a narcotic prescription or other medication, please call before your infusion appointment.                 February 2024 Sunday Monday Tuesday Wednesday Thursday Friday Saturday                       1    INFUSION 1.5 HR (90 MIN)  12:15 PM   (90 min.)   [facility] INFUSION CHAIR   [facility] 2     3    ONC INFUSION 0.5 HR (30 MIN)   1:00 PM   (30 min.)    ONC INFUSION NURSE   [facility]   4     5    INFUSION 1.5 HR (90 MIN)  12:15 PM   (90 min.)   [facility] INFUSION CHAIR   [facility] 6     7    INFUSION 1.5 HR (90 MIN)  12:15 PM   (90 min.)   [facility] INFUSION CHAIR   [facility] 8     9    INFUSION 1.5 HR (90 MIN)  12:15 PM   (90 min.)   [facility] INFUSION CHAIR   [facility] 10       11    ONC INFUSION 0.5 HR (30 MIN)   1:00 PM   (30 min.)    ONC INFUSION NURSE   [facility] 12     13    RETURN INFECTIOUS DISEASE  10:45 AM   (20 min.)   Braulio Granados MD   [facility]    INFUSION 1.5 HR (90 MIN)  12:15 PM   (90 min.)   [facility] INFUSION CHAIR   [facility] 14     15     16     17       18     19     20     21     22     23     24       25     26     27     28     29 March 2024       Sunday Monday Tuesday Wednesday Thursday Friday Saturday                            1     2       3     4     5     6     7     8     9       10     11     12     13     14     15     16       17     18     19     20     21     22     23       24     25     26     27     28     29     30       31                                                   Lab Results:  No results found for this or any previous visit (from the past 12 hour(s)).

## 2024-02-13 ENCOUNTER — INFUSION THERAPY VISIT (OUTPATIENT)
Dept: INFUSION THERAPY | Facility: HOSPITAL | Age: 69
End: 2024-02-13
Payer: COMMERCIAL

## 2024-02-13 ENCOUNTER — OFFICE VISIT (OUTPATIENT)
Dept: INFECTIOUS DISEASES | Facility: CLINIC | Age: 69
End: 2024-02-13
Payer: COMMERCIAL

## 2024-02-13 VITALS
HEART RATE: 73 BPM | DIASTOLIC BLOOD PRESSURE: 78 MMHG | SYSTOLIC BLOOD PRESSURE: 149 MMHG | OXYGEN SATURATION: 96 % | TEMPERATURE: 98 F

## 2024-02-13 VITALS
TEMPERATURE: 97.9 F | OXYGEN SATURATION: 98 % | DIASTOLIC BLOOD PRESSURE: 77 MMHG | HEART RATE: 74 BPM | SYSTOLIC BLOOD PRESSURE: 161 MMHG | RESPIRATION RATE: 16 BRPM

## 2024-02-13 DIAGNOSIS — M00.841: Primary | ICD-10-CM

## 2024-02-13 PROCEDURE — 99213 OFFICE O/P EST LOW 20 MIN: CPT | Performed by: STUDENT IN AN ORGANIZED HEALTH CARE EDUCATION/TRAINING PROGRAM

## 2024-02-13 PROCEDURE — G0463 HOSPITAL OUTPT CLINIC VISIT: HCPCS

## 2024-02-13 RX ORDER — EPINEPHRINE 1 MG/ML
0.3 INJECTION, SOLUTION INTRAMUSCULAR; SUBCUTANEOUS EVERY 5 MIN PRN
Status: DISCONTINUED | OUTPATIENT
Start: 2024-02-13 | End: 2024-02-13

## 2024-02-13 RX ORDER — HEPARIN SODIUM,PORCINE 10 UNIT/ML
5-20 VIAL (ML) INTRAVENOUS DAILY PRN
OUTPATIENT
Start: 2024-02-13

## 2024-02-13 RX ORDER — DIPHENHYDRAMINE HYDROCHLORIDE 50 MG/ML
50 INJECTION INTRAMUSCULAR; INTRAVENOUS
Status: DISCONTINUED | OUTPATIENT
Start: 2024-02-13 | End: 2024-02-13

## 2024-02-13 RX ORDER — HEPARIN SODIUM (PORCINE) LOCK FLUSH IV SOLN 100 UNIT/ML 100 UNIT/ML
5 SOLUTION INTRAVENOUS
OUTPATIENT
Start: 2024-02-13

## 2024-02-13 RX ORDER — METHYLPREDNISOLONE SODIUM SUCCINATE 125 MG/2ML
125 INJECTION, POWDER, LYOPHILIZED, FOR SOLUTION INTRAMUSCULAR; INTRAVENOUS
Start: 2024-02-13

## 2024-02-13 RX ORDER — ALBUTEROL SULFATE 90 UG/1
1-2 AEROSOL, METERED RESPIRATORY (INHALATION)
Start: 2024-02-13

## 2024-02-13 RX ORDER — EPINEPHRINE 1 MG/ML
0.3 INJECTION, SOLUTION INTRAMUSCULAR; SUBCUTANEOUS EVERY 5 MIN PRN
OUTPATIENT
Start: 2024-02-13

## 2024-02-13 RX ORDER — DIPHENHYDRAMINE HYDROCHLORIDE 50 MG/ML
50 INJECTION INTRAMUSCULAR; INTRAVENOUS
Start: 2024-02-13

## 2024-02-13 RX ORDER — METHYLPREDNISOLONE SODIUM SUCCINATE 125 MG/2ML
125 INJECTION, POWDER, LYOPHILIZED, FOR SOLUTION INTRAMUSCULAR; INTRAVENOUS
Status: DISCONTINUED | OUTPATIENT
Start: 2024-02-13 | End: 2024-02-13

## 2024-02-13 RX ORDER — ALBUTEROL SULFATE 90 UG/1
1-2 AEROSOL, METERED RESPIRATORY (INHALATION)
Status: DISCONTINUED | OUTPATIENT
Start: 2024-02-13 | End: 2024-02-13

## 2024-02-13 RX ORDER — ALBUTEROL SULFATE 0.83 MG/ML
2.5 SOLUTION RESPIRATORY (INHALATION)
Status: DISCONTINUED | OUTPATIENT
Start: 2024-02-13 | End: 2024-02-13

## 2024-02-13 RX ORDER — ALBUTEROL SULFATE 0.83 MG/ML
2.5 SOLUTION RESPIRATORY (INHALATION)
OUTPATIENT
Start: 2024-02-13

## 2024-02-13 NOTE — PROGRESS NOTES
[facility] follow up.    Name: Maurice Biswas  :   1955  MRN:   [id number]  PCP:    Esa Valdivia  DOS:    24      CC: Left second PIP infection with Pseudomonas.    HPI/Interval History:  Maurice Biswas is a 68 year old old male with the history of myasthenia gravis, and psoriatic arthritis who is on IV cefepime for left second PIP infection with Pseudomonas.  A PICC line is placed and he is having no side effect.  He does report a new upper back pain for the last 4 days.  Pain is worse when he turns his torso.  No abdominal pain, no cough or fever or chills.    ===========================  Past Medical History:  PIP surgery    Past Surgical History:  Past Surgical History:   Procedure Laterality Date    IR CVC NON TUNNEL PLACEMENT > 5 YRS  2023    IR RENAL BIOPSY LEFT  2018    PICC SINGLE LUMEN PLACEMENT  2024       Social History:  Social History     Socioeconomic History    Marital status:      Spouse name: Not on file    Number of children: Not on file    Years of education: Not on file    Highest education level: Not on file   Occupational History    Not on file   Tobacco Use    Smoking status: Never    Smokeless tobacco: Never   Substance and Sexual Activity    Alcohol use: No    Drug use: No    Sexual activity: Yes     Partners: Female   Other Topics Concern    Not on file   Social History Narrative    Not on file     Social Determinants of Health     Financial Resource Strain: Not on file   Food Insecurity: Not on file   Transportation Needs: Not on file   Physical Activity: Not on file   Stress: Not on file   Social Connections: Not on file   Interpersonal Safety: Not on file   Housing Stability: Not on file       Family Medical History:  No family history on file.    Allergies:     Allergies   Allergen Reactions    Doxycycline Muscle Pain (Myalgia) and Other (See Comments)     Brought on Myasthenia Gravis       Medications:  Current Outpatient Medications    Medication Sig Dispense Refill    amLODIPine (NORVASC) 10 MG tablet Take 1 tablet (10 mg) by mouth daily 30 tablet 0    Calcium Carbonate-Vitamin D (OYSTER SHELL CALCIUM/D) 500-5 MG-MCG TABS Take 1 tablet by mouth 2 times daily (with meals)      fish oil-omega-3 fatty acids 1000 MG capsule Take 2 capsules by mouth 2 times daily      fluticasone (FLONASE) 50 mcg/actuation nasal spray [FLUTICASONE (FLONASE) 50 MCG/ACTUATION NASAL SPRAY] 2 sprays into each nostril daily as needed for rhinitis.      levothyroxine (SYNTHROID/LEVOTHROID) 112 MCG tablet Take 112 mcg by mouth daily      losartan (COZAAR) 100 MG tablet Take 1 tablet (100 mg) by mouth daily      mycophenolate (GENERIC EQUIVALENT) 500 MG tablet Take 1.5 tablets (750 mg) by mouth 2 times daily 90 tablet 11    predniSONE (DELTASONE) 20 MG tablet Take 3 tablets (60mg) by mouth on odd days and  1 tablets (40mg) by mouth on even days 60 tablet 11    pyRIDostigmine (MESTINON) 60 MG tablet One po qam and one 1 pm 60 tablet 11       Immunizations:  Immunization History   Administered Date(s) Administered    COVID-19 12+ (2023-24) (MODERNA) 11/29/2023    COVID-19 Bivalent 12+ (Pfizer) 11/09/2022    COVID-19 MONOVALENT 12+ (Pfizer) 11/03/2021    COVID-19 Vaccine (Tyesha) 03/09/2021       ==================    Review of System:  12 points review of system is negative except for findings in the HPI.    Exam  VS: BP (!) 149/78 (BP Location: Left arm)   Pulse 73   Temp 98  F (36.7  C)   SpO2 96%     Gen: Pleasant in no acute distress.  HEENT: NCAT. EOMI.  Neck: No LAD.  Lungs: Clear to auscultation bilaterally with no crackles or wheezes.   Card: RRR. No RMG. Peripheral pulses present and symmetrical. No edema.   Abd: Soft NT ND. No hepatomegaly or splenomegaly.  Ext: Left second PIP incision nicely healed.  No evidence of infection locally.  Lymph: No cervical or supraclavicular adenopathy.  Skin: No rash  Neuro: Alert and oriented to place time and person. Cranial  nerves grossly intact.     Labs:  Reviewed    Imaging:  Reviewed    Assessment:  Maurice Biswas is a 68 year old old male with history of myasthenia gravis and psoriatic arthritis status post 3 weeks of IV cefepime for left second PIP infection with pseudomonas.  Wound is nicely healed.  Upper back pain of unclear etiology.      Recommendations:  End of IV antibiotic therapy.  Okay to remove PICC line today at the 12:30 appointment.  Follow-up as needed.      Braulio Granados MD  [facility]  [facility]  Office Telephone [phone].  Fax [phone]  [facility] paging

## 2024-02-13 NOTE — PROGRESS NOTES
Infusion Nursing Note:  Maurice Biswas presents today for PICC line removal.    Patient seen by provider today: Yes: Dr. Rose   present during visit today: Not Applicable.    Note: Maurice arrived ambulatory attached to home infusion pump with empty antibiotic bag. Per Dr. Rose, he is finished with IV antibiotics and can have PICC line removed today. PICC line removed with no issues. Monitored for 30 minutes and instructed to leave current dressing on for 24 hours. PICC site is clean, dry, and intact.      Intravenous Access:  PICC.    Treatment Conditions:  Not Applicable.      Post Infusion Assessment:  Patient observed for 30 minutes post PICC removal per protocol.       Discharge Plan:   Patient discharged in stable condition accompanied by: wife.  Departure Mode: Ambulatory.      Ivon Adler RN

## 2024-02-15 LAB
BACTERIA TISS BX CULT: NO GROWTH

## 2024-03-10 ENCOUNTER — APPOINTMENT (OUTPATIENT)
Dept: CT IMAGING | Facility: HOSPITAL | Age: 69
End: 2024-03-10
Payer: COMMERCIAL

## 2024-03-10 ENCOUNTER — HOSPITAL ENCOUNTER (EMERGENCY)
Facility: HOSPITAL | Age: 69
Discharge: HOME | End: 2024-03-10
Attending: EMERGENCY MEDICINE | Admitting: EMERGENCY MEDICINE
Payer: COMMERCIAL

## 2024-03-10 ENCOUNTER — APPOINTMENT (OUTPATIENT)
Dept: RADIOLOGY | Facility: HOSPITAL | Age: 69
End: 2024-03-10
Payer: COMMERCIAL

## 2024-03-10 VITALS
WEIGHT: 188 LBS | RESPIRATION RATE: 16 BRPM | HEIGHT: 70 IN | OXYGEN SATURATION: 99 % | TEMPERATURE: 98.9 F | BODY MASS INDEX: 26.92 KG/M2 | HEART RATE: 66 BPM | SYSTOLIC BLOOD PRESSURE: 146 MMHG | DIASTOLIC BLOOD PRESSURE: 87 MMHG

## 2024-03-10 DIAGNOSIS — S32.010A COMPRESSION FRACTURE OF L1 VERTEBRA, INITIAL ENCOUNTER (H): Primary | ICD-10-CM

## 2024-03-10 LAB
ALBUMIN UR-MCNC: 20 MG/DL
ANION GAP SERPL CALCULATED.3IONS-SCNC: 7 MMOL/L (ref 7–15)
APPEARANCE UR: ABNORMAL
BASOPHILS # BLD AUTO: 0 10E3/UL (ref 0–0.2)
BASOPHILS NFR BLD AUTO: 0 %
BILIRUB UR QL STRIP: NEGATIVE
BUN SERPL-MCNC: 31.4 MG/DL (ref 8–23)
CALCIUM SERPL-MCNC: 9.8 MG/DL (ref 8.8–10.2)
CHLORIDE SERPL-SCNC: 104 MMOL/L (ref 98–107)
COLOR UR AUTO: YELLOW
CREAT SERPL-MCNC: 1.19 MG/DL (ref 0.67–1.17)
DEPRECATED HCO3 PLAS-SCNC: 31 MMOL/L (ref 22–29)
EGFRCR SERPLBLD CKD-EPI 2021: 66 ML/MIN/1.73M2
EOSINOPHIL # BLD AUTO: 0 10E3/UL (ref 0–0.7)
EOSINOPHIL NFR BLD AUTO: 0 %
ERYTHROCYTE [DISTWIDTH] IN BLOOD BY AUTOMATED COUNT: 13.3 % (ref 10–15)
GLUCOSE SERPL-MCNC: 86 MG/DL (ref 70–99)
GLUCOSE UR STRIP-MCNC: NEGATIVE MG/DL
HCT VFR BLD AUTO: 42.3 % (ref 40–53)
HGB BLD-MCNC: 14.1 G/DL (ref 13.3–17.7)
HGB UR QL STRIP: NEGATIVE
HOLD SPECIMEN: NORMAL
HOLD SPECIMEN: NORMAL
HYALINE CASTS: 2 /LPF
IMM GRANULOCYTES # BLD: 0.1 10E3/UL
IMM GRANULOCYTES NFR BLD: 1 %
KETONES UR STRIP-MCNC: NEGATIVE MG/DL
LEUKOCYTE ESTERASE UR QL STRIP: NEGATIVE
LYMPHOCYTES # BLD AUTO: 2.2 10E3/UL (ref 0.8–5.3)
LYMPHOCYTES NFR BLD AUTO: 17 %
MCH RBC QN AUTO: 29.8 PG (ref 26.5–33)
MCHC RBC AUTO-ENTMCNC: 33.3 G/DL (ref 31.5–36.5)
MCV RBC AUTO: 89 FL (ref 78–100)
MONOCYTES # BLD AUTO: 0.9 10E3/UL (ref 0–1.3)
MONOCYTES NFR BLD AUTO: 7 %
MUCOUS THREADS #/AREA URNS LPF: PRESENT /LPF
NEUTROPHILS # BLD AUTO: 9.5 10E3/UL (ref 1.6–8.3)
NEUTROPHILS NFR BLD AUTO: 75 %
NITRATE UR QL: NEGATIVE
NRBC # BLD AUTO: 0 10E3/UL
NRBC BLD AUTO-RTO: 0 /100
PH UR STRIP: 5.5 [PH] (ref 5–7)
PLATELET # BLD AUTO: 190 10E3/UL (ref 150–450)
POTASSIUM SERPL-SCNC: 4.2 MMOL/L (ref 3.4–5.3)
RBC # BLD AUTO: 4.73 10E6/UL (ref 4.4–5.9)
RBC URINE: 0 /HPF
SODIUM SERPL-SCNC: 142 MMOL/L (ref 135–145)
SP GR UR STRIP: 1.03 (ref 1–1.03)
SQUAMOUS EPITHELIAL: <1 /HPF
UROBILINOGEN UR STRIP-MCNC: <2 MG/DL
WBC # BLD AUTO: 12.8 10E3/UL (ref 4–11)
WBC URINE: 0 /HPF

## 2024-03-10 PROCEDURE — 72100 X-RAY EXAM L-S SPINE 2/3 VWS: CPT

## 2024-03-10 PROCEDURE — 81001 URINALYSIS AUTO W/SCOPE: CPT | Performed by: EMERGENCY MEDICINE

## 2024-03-10 PROCEDURE — 250N000013 HC RX MED GY IP 250 OP 250 PS 637: Performed by: EMERGENCY MEDICINE

## 2024-03-10 PROCEDURE — 250N000013 HC RX MED GY IP 250 OP 250 PS 637: Performed by: STUDENT IN AN ORGANIZED HEALTH CARE EDUCATION/TRAINING PROGRAM

## 2024-03-10 PROCEDURE — 85025 COMPLETE CBC W/AUTO DIFF WBC: CPT | Performed by: STUDENT IN AN ORGANIZED HEALTH CARE EDUCATION/TRAINING PROGRAM

## 2024-03-10 PROCEDURE — 85025 COMPLETE CBC W/AUTO DIFF WBC: CPT | Performed by: EMERGENCY MEDICINE

## 2024-03-10 PROCEDURE — 74176 CT ABD & PELVIS W/O CONTRAST: CPT

## 2024-03-10 PROCEDURE — 96375 TX/PRO/DX INJ NEW DRUG ADDON: CPT

## 2024-03-10 PROCEDURE — 80048 BASIC METABOLIC PNL TOTAL CA: CPT | Performed by: STUDENT IN AN ORGANIZED HEALTH CARE EDUCATION/TRAINING PROGRAM

## 2024-03-10 PROCEDURE — 250N000013 HC RX MED GY IP 250 OP 250 PS 637

## 2024-03-10 PROCEDURE — 99285 EMERGENCY DEPT VISIT HI MDM: CPT | Mod: 25

## 2024-03-10 PROCEDURE — 36415 COLL VENOUS BLD VENIPUNCTURE: CPT | Performed by: STUDENT IN AN ORGANIZED HEALTH CARE EDUCATION/TRAINING PROGRAM

## 2024-03-10 PROCEDURE — 999N000104 CT LUMBAR SPINE RECONSTRUCTED

## 2024-03-10 PROCEDURE — 250N000011 HC RX IP 250 OP 636: Performed by: EMERGENCY MEDICINE

## 2024-03-10 PROCEDURE — 250N000011 HC RX IP 250 OP 636

## 2024-03-10 PROCEDURE — 96374 THER/PROPH/DIAG INJ IV PUSH: CPT

## 2024-03-10 PROCEDURE — 80048 BASIC METABOLIC PNL TOTAL CA: CPT | Performed by: EMERGENCY MEDICINE

## 2024-03-10 RX ORDER — OXYCODONE HYDROCHLORIDE 5 MG/1
TABLET ORAL
Qty: 6 TABLET | Refills: 0 | Status: SHIPPED | OUTPATIENT
Start: 2024-03-10 | End: 2024-05-01

## 2024-03-10 RX ORDER — LIDOCAINE 4 G/G
1 PATCH TOPICAL ONCE
Status: DISCONTINUED | OUTPATIENT
Start: 2024-03-10 | End: 2024-03-10 | Stop reason: HOSPADM

## 2024-03-10 RX ORDER — LORAZEPAM 2 MG/ML
0.5 INJECTION INTRAMUSCULAR ONCE
Status: COMPLETED | OUTPATIENT
Start: 2024-03-10 | End: 2024-03-10

## 2024-03-10 RX ORDER — OXYCODONE HYDROCHLORIDE 5 MG/1
5 TABLET ORAL ONCE
Status: COMPLETED | OUTPATIENT
Start: 2024-03-10 | End: 2024-03-10

## 2024-03-10 RX ORDER — ONDANSETRON 4 MG/1
4 TABLET, ORALLY DISINTEGRATING ORAL EVERY 8 HOURS PRN
Qty: 10 TABLET | Refills: 0 | Status: SHIPPED | OUTPATIENT
Start: 2024-03-10 | End: 2024-03-13

## 2024-03-10 RX ORDER — ONDANSETRON 2 MG/ML
4 INJECTION INTRAMUSCULAR; INTRAVENOUS ONCE
Status: COMPLETED | OUTPATIENT
Start: 2024-03-10 | End: 2024-03-10

## 2024-03-10 RX ORDER — ACETAMINOPHEN 325 MG/1
975 TABLET ORAL ONCE
Status: COMPLETED | OUTPATIENT
Start: 2024-03-10 | End: 2024-03-10

## 2024-03-10 RX ADMIN — ACETAMINOPHEN 975 MG: 325 TABLET ORAL at 11:46

## 2024-03-10 RX ADMIN — HYDROMORPHONE HYDROCHLORIDE 0.5 MG: 1 INJECTION, SOLUTION INTRAMUSCULAR; INTRAVENOUS; SUBCUTANEOUS at 15:36

## 2024-03-10 RX ADMIN — Medication 50 MG: at 11:46

## 2024-03-10 RX ADMIN — OXYCODONE HYDROCHLORIDE 5 MG: 5 TABLET ORAL at 13:10

## 2024-03-10 RX ADMIN — ONDANSETRON 4 MG: 2 INJECTION INTRAMUSCULAR; INTRAVENOUS at 15:35

## 2024-03-10 RX ADMIN — LIDOCAINE 1 PATCH: 4 PATCH TOPICAL at 15:38

## 2024-03-10 RX ADMIN — LORAZEPAM 0.5 MG: 2 INJECTION INTRAMUSCULAR; INTRAVENOUS at 14:17

## 2024-03-10 ASSESSMENT — ACTIVITIES OF DAILY LIVING (ADL)
ADLS_ACUITY_SCORE: 35

## 2024-03-10 ASSESSMENT — COLUMBIA-SUICIDE SEVERITY RATING SCALE - C-SSRS
6. HAVE YOU EVER DONE ANYTHING, STARTED TO DO ANYTHING, OR PREPARED TO DO ANYTHING TO END YOUR LIFE?: NO
1. IN THE PAST MONTH, HAVE YOU WISHED YOU WERE DEAD OR WISHED YOU COULD GO TO SLEEP AND NOT WAKE UP?: NO
2. HAVE YOU ACTUALLY HAD ANY THOUGHTS OF KILLING YOURSELF IN THE PAST MONTH?: NO

## 2024-03-10 NOTE — PROGRESS NOTES
[facility]  Telephone Note    Contacted by Rosalie Benson PA-C at [facility] ED.     68M presented with acute low back pain x 4 days. Denies any falls or injuries. History of osteopenia, myasthenia gravis, on daily prednisone. No radicular pain, numbness, weakness, or bowel/bladder changes. Per ED, patient is neuro intact on exam.     Imaging as stated below -    EXAM: CT LUMBAR SPINE RECONSTRUCTED  LOCATION: [facility]  DATE: 03/10/2024                                                              IMPRESSION:  1.  Mild L1 superior endplate compression fracture, new since 01/16/2023  2.  Multilevel lower lumbar predominant spondylosis, most advanced at L5-S1.    Plan:   -Upright lumbar spine xray today   -Avoid heavy lifting, bending, twisting  -Continue pain control measures as needed  -ED is planning to discharge patient today   -Follow up with NSGY clinic in 3 weeks with repeat upright xray: Clinic team will call patient to schedule appt     Discussed with Dr. Delio De Guzman, CNP  [facility]  Tel [phone]  Pager [phone]

## 2024-03-10 NOTE — DISCHARGE INSTRUCTIONS
You were seen in the emergency department today for your symptoms.  Your imaging did show a compression fracture of the L1 vertebra.  The neurosurgery team should call you tomorrow to schedule a follow-up appointment.  Do not lift any object that weighs more than 10 pounds at least until you follow-up with neurosurgery.  As we discussed, if you develop any numbness, tingling, weakness, loss of control of your bowel or bladder, numbness/tingling/weakness in your groin region, fevers or any other new or concerning symptoms, please immediately go to the emergency department.    You have been prescribed a narcotic pain medication that has risk for addiction with prolonged use, so please use sparingly. Additionally, narcotics are medications that are sedating (will make you sleepy), so do not drive or operate machinery while taking this medication. Avoid alcohol or other sedating medicines such as benzodiazepines while taking narcotics due to risk for increased sedation and difficulty breathing.     Narcotics will cause constipation. If you need to take this medication please consider taking an over-the-counter stool softener and laxative, such as SennaPlus, to prevent constipation from developing. Nausea is a side effect of narcotic use. Possible additional side effects include vomiting, itching, and dizziness/lightheadedness.

## 2024-03-10 NOTE — ED PROVIDER NOTES
EMERGENCY DEPARTMENT ENCOUNTER      NAME: Maurice Biswas  AGE: 68 year old male  YOB: 1955  MRN: [id number]  EVALUATION DATE & TIME: 03/10/24 3:25 PM    PCP: Esa Valdivia    ED PROVIDER: Rosalie Benson PA-C      CHIEF COMPLAINT:  Back Pain      FINAL IMPRESSION:  1. Compression fracture of L1 vertebra, initial encounter (H)          ED COURSE & MEDICAL DECISION MAKING:  Pertinent Labs & Imaging studies reviewed. (See chart for details)    The patient is a 68-year-old male with a history of nephrolithiasis presenting to the emergency department for evaluation of ongoing bilateral low back pain wrapping around his flanks to the lower abdomen bilaterally for the past 2 weeks, worse in the past 4 days.  No specific trauma or injury preceding symptom onset other than lifting heavy furniture.  No bowel or bladder incontinence, saddle paresthesias, fevers.  No history of IV drug use.    Initial vital signs reviewed and all within normal limits.  On exam, patient is clinically well-appearing and is nontoxic-appearing resting comfortably in exam chair in no acute distress.  He does have flank pain bilaterally.  No midline cervical, thoracic, lumbar tenderness to palpation.  Strength and sensation, DTRs intact and symmetric bilateral lower extremities.    Differential diagnosis for back pain includes muscle spasm/strain, slipped disc w/ radicular pain including sciatica, vertebral fracture or tumor, or pyelonephritis. Low clinical suspicion for epidural abscess as the patient Low clinical suspicion for ruptured AAA. Low clinical suspicion for cauda equina in absence of saddle paraesthesias, bowel or bladder incontinence; MRI not indicated emergently. With radiation of pain wrapping around flanks into abdomen, also considered nephrolithiasis, hydronephrosis, appendicitis, acute diverticulitis.    Workup included CBC with mild leukocytosis.  BMP with creatinine minimally elevated compared to prior.   Urinalysis which was not suggestive of infection.  CT abdomen pelvis obtained which revealed findings of possible colocolonic fistula to consider.  Patient already established with GI and can follow-up with on this finding with them as an outpatient.  CT lumbar spine obtained which revealed mild L1 superior endplate compression fracture, appears new.  I did consult neurosurgery, on-call neurosurgery [facility] who recommends upright spine films and neurosurgery clinic will reach out to the patient to schedule close outpatient follow-up.    Overall, patient history, exam, workup here most consistent with acute L1 fracture.  The patient was given Dilaudid, lidocaine, ondansetron, lorazepam, oxycodone, acetaminophen, dimenhydrinate for his symptoms with improvement.    Discussed plan for discharge home with close outpatient follow-up with neurosurgery clinic, GI regarding CT abdomen findings, as well as his primary care clinician for close recheck.. The patient verbalized understanding and is comfortable with this plan. Symptomatic home cares discussed. Emphasized importance of follow up with primary care clinician. Strict return precautions discussed.     At the conclusion of the encounter I discussed the results of all of the tests and the disposition. The questions were answered. The patient or family acknowledged understanding and was agreeable with the care plan.       ED COURSE:  11:45 AM I met and introduced myself to the patient. I gathered initial history and performed an initial physical exam. We discussed options and plan for diagnostics and treatment here in the ED.  12:09 PM I have staffed the patient with Dr. Damien Loving, ED physician, who has evaluated the patient and agrees with all aspects of today's care.   3:26 PM I discussed the patient with GARCIA Roach with neurosurgery follow up with clinic in 3 weeks. Precautions for patient include no lifting more than 10 lbs.  3:37 PM I updated the patient on  plan of care. I discussed the plan for discharge with the patient, and patient is agreeable. We discussed supportive cares at home and reasons for return to the ER including new or worsening symptoms - all questions and concerns addressed to the best of my ability. Strict return precautions discussed. Patient to be discharged by RN.      Medical Decision Making  Obtained supplemental history:Supplemental history obtained?: No  Reviewed external records: External records reviewed?: Documented in chart and Inpatient Record: urgent care 3/7/24  Care impacted by chronic illness:Other: myasthenia gravis  Care significantly affected by social determinants of health:N/A  Did you consider but not order tests?: Work up considered but not performed and documented in chart, if applicable  Did you interpret images independently?: Independent interpretation of ECG and images noted in documentation, when applicable.  Consultation discussion with other provider:Did you involve another provider (consultant, MH, pharmacy, etc.)?: I discussed the care with another health care provider, see documentation for details.  Discharge. I prescribed additional prescription strength medication(s) as charted. See documentation for any additional details.      CRITICAL CARE:  None      MEDICATIONS GIVEN IN THE EMERGENCY:  Medications   dimenhyDRINATE chew tab 50 mg (50 mg Oral $Given 3/10/24 1146)   acetaminophen (TYLENOL) tablet 975 mg (975 mg Oral $Given 3/10/24 1146)   oxyCODONE (ROXICODONE) tablet 5 mg (5 mg Oral $Given 3/10/24 1310)   LORazepam (ATIVAN) injection 0.5 mg (0.5 mg Intravenous $Given 3/10/24 1417)   HYDROmorphone (DILAUDID) injection 0.5 mg (0.5 mg Intravenous $Given 3/10/24 1536)   ondansetron (ZOFRAN) injection 4 mg (4 mg Intravenous $Given 3/10/24 1535)       NEW PRESCRIPTIONS STARTED AT TODAY'S ER VISIT  Discharge Medication List as of 3/10/2024  4:30 PM        START taking these medications    Details   ondansetron (ZOFRAN  ODT) 4 MG ODT tab Take 1 tablet (4 mg) by mouth every 8 hours as needed for nausea, Disp-10 tablet, R-0, Local Print      oxyCODONE (ROXICODONE) 5 MG tablet Take every 4-6 hours as needed for severe pain.  Take 1 tablet to start.  If your pain is is still severe 2 hours later, you can take a second tablet.  If you take the second tablet, do not take any additional oxycodone for the following 4-6 hours., Disp -6 tablet, R-0, Local Print                =================================================================    HPI    Patient information was obtained from: the patient     Use of Intrepreter: N/A        Maurice Biswas is a 68 year old male with pertinent medical history of nephrolithiasis who presents to the emergency department for evaluation of back pain.    Per chart review, the patient was seen in urgent care on 3/7/2024 for evaluation of constant low back pain for the past 2 weeks after lifting furniture couple weeks ago.  Denied fever, urinary or bowel incontinence, pain radiating down legs, injury to lower back, leg numbness/tingling/weakness.  Urinalysis was unremarkable.  Lumbar x-ray showed degenerative changes no evidence of acute fracture.  Referral placed for physical therapy.  Patient prescribed Robaxin and discharged home.    The patient reports 2 weeks of bilateral low back pain initially present when going from a seated to standing position, now constantly present with acute worsening for the past 4 days.  The patient's low back pain wraps around bilaterally to his abdomen.  He also endorses some mild dysuria. No history of IV drug use.  He developed his symptoms after lifting heavy furniture.  No fevers, chills, saddle paresthesias, bowel or bladder incontinence.   Patient states current symptoms do not feel similar to kidney stones.        PAST MEDICAL HISTORY:  No past medical history on file.    PAST SURGICAL HISTORY:  Past Surgical History:   Procedure Laterality Date    IR CVC NON TUNNEL  "PLACEMENT > 5 YRS  8/31/2023    IR RENAL BIOPSY LEFT  8/20/2018    PICC SINGLE LUMEN PLACEMENT  1/24/2024       CURRENT MEDICATIONS:    Prior to Admission Medications   Prescriptions Last Dose Informant Patient Reported? Taking?   Calcium Carbonate-Vitamin D (OYSTER SHELL CALCIUM/D) 500-5 MG-MCG TABS   Yes No   Sig: Take 1 tablet by mouth 2 times daily (with meals)   amLODIPine (NORVASC) 10 MG tablet   No No   Sig: Take 1 tablet (10 mg) by mouth daily   fish oil-omega-3 fatty acids 1000 MG capsule   Yes No   Sig: Take 2 capsules by mouth 2 times daily   fluticasone (FLONASE) 50 mcg/actuation nasal spray   Yes No   Sig: [FLUTICASONE (FLONASE) 50 MCG/ACTUATION NASAL SPRAY] 2 sprays into each nostril daily as needed for rhinitis.   levothyroxine (SYNTHROID/LEVOTHROID) 112 MCG tablet   Yes No   Sig: Take 112 mcg by mouth daily   losartan (COZAAR) 100 MG tablet   No No   Sig: Take 1 tablet (100 mg) by mouth daily   mycophenolate (GENERIC EQUIVALENT) 500 MG tablet   No No   Sig: Take 1.5 tablets (750 mg) by mouth 2 times daily   predniSONE (DELTASONE) 20 MG tablet   No No   Sig: Take 3 tablets (60mg) by mouth on odd days and  1 tablets (40mg) by mouth on even days   pyRIDostigmine (MESTINON) 60 MG tablet   No No   Sig: One po qam and one 1 pm      Facility-Administered Medications: None       ALLERGIES:  Allergies   Allergen Reactions    Doxycycline Muscle Pain (Myalgia) and Other (See Comments)     Brought on Myasthenia Gravis       FAMILY HISTORY:  No family history on file.    SOCIAL HISTORY:  Social History     Tobacco Use    Smoking status: Never    Smokeless tobacco: Never   Substance Use Topics    Alcohol use: No    Drug use: No        VITALS:    First Vitals:  No data found.      No data found.        PHYSICAL EXAM  VITAL SIGNS: BP (!) 146/87   Pulse 66   Temp 98.9  F (37.2  C) (Temporal)   Resp 16   Ht 1.778 m (5' 10\")   Wt 85.3 kg (188 lb)   SpO2 99%   BMI 26.98 kg/m     Constitutional: Awake, alert, No " acute distress.   HENT: Normocephalic, atraumatic. Full ROM. No c-spine tenderness, crepitus, step-offs, or deformities. No tenderness to palpation along trapezius and SCM musculature. No meningeal signs.  Eyes: PERRL, EOMI, Conjunctiva normal, No discharge, no scleral icterus. No nystagmus.  Respiratory: Breathing easily, clear and equal breath sounds  Cardiovascular: Regular rate and rhythm. Peripheral pulses dp, pt, and radial are wnl. No peripheral edema.  GI: Bowel sounds normal, Soft, No abdominal tenderness, bilateral flank tenderness, nondistended.  Musculoskeletal: No tenderness along midline thoracic or lumbar spine. No crepitus, step-offs, or deformities. No tenderness with palpation throughout bilateral low back musculature.  strength 5/5 and symmetric. Full ROM of knee with no tenderness. No leg length asymmetry or external rotation. Negative SLR and strength in lower extremities intact and symmetric. Patellar and achilles DTR intact and symmetric.   Integument: Warm, Dry. No skin changes, ecchymosis, abrasions, warmth, or redness.  Neurologic: Alert & oriented x 3. Normal speech. Distal sensation intact and symmetric.    Psychiatric: Affect normal, Judgment normal, Mood normal. No obvious hallucinations at this time.       RADIOLOGY/LAB:  Reviewed all pertinent imaging. Please see official radiology report.  All pertinent labs reviewed and interpreted.  Results for orders placed or performed during the hospital encounter of 03/10/24   CT Abdomen Pelvis w/o Contrast    Impression    IMPRESSION:   1.  No urinary tract stones or hydronephrosis.  2.  Sigmoid diverticulosis. There is a bandlike curvilinear structure abutting the left side of the distal sigmoid colon with a small amount of adjacent fat stranding. These findings could reflect sequela of recent or remote acute diverticulitis. Given   its curvilinear appearance, a colocolonic fistula is a consideration.       CT Lumbar Spine Reconstructed     Impression    IMPRESSION:  1.  Mild L1 superior endplate compression fracture, new since 01/16/2023  2.  Multilevel lower lumbar predominant spondylosis, most advanced at L5-S1.     XR Lumbar Spine 2/3 Views    Impression    IMPRESSION: Minimally depressed L1 fracture is unchanged from prior. Normal alignment. Moderate multilevel disc height loss and facet hypertrophy, greatest at L5-S1. Aortic atherosclerosis.   UA with Microscopic reflex to Culture    Specimen: Urine, Clean Catch   Result Value Ref Range    Color Urine Yellow Colorless, Straw, Light Yellow, Yellow    Appearance Urine Turbid (A) Clear    Glucose Urine Negative Negative mg/dL    Bilirubin Urine Negative Negative    Ketones Urine Negative Negative mg/dL    Specific Gravity Urine 1.029 1.001 - 1.030    Blood Urine Negative Negative    pH Urine 5.5 5.0 - 7.0    Protein Albumin Urine 20 (A) Negative mg/dL    Urobilinogen Urine <2.0 <2.0 mg/dL    Nitrite Urine Negative Negative    Leukocyte Esterase Urine Negative Negative    Mucus Urine Present (A) None Seen /LPF    RBC Urine 0 <=2 /HPF    WBC Urine 0 <=5 /HPF    Squamous Epithelials Urine <1 <=1 /HPF    Hyaline Casts Urine 2 <=2 /LPF   Basic metabolic panel   Result Value Ref Range    Sodium 142 135 - 145 mmol/L    Potassium 4.2 3.4 - 5.3 mmol/L    Chloride 104 98 - 107 mmol/L    Carbon Dioxide (CO2) 31 (H) 22 - 29 mmol/L    Anion Gap 7 7 - 15 mmol/L    Urea Nitrogen 31.4 (H) 8.0 - 23.0 mg/dL    Creatinine 1.19 (H) 0.67 - 1.17 mg/dL    GFR Estimate 66 >60 mL/min/1.73m2    Calcium 9.8 8.8 - 10.2 mg/dL    Glucose 86 70 - 99 mg/dL   CBC with platelets and differential   Result Value Ref Range    WBC Count 12.8 (H) 4.0 - 11.0 10e3/uL    RBC Count 4.73 4.40 - 5.90 10e6/uL    Hemoglobin 14.1 13.3 - 17.7 g/dL    Hematocrit 42.3 40.0 - 53.0 %    MCV 89 78 - 100 fL    MCH 29.8 26.5 - 33.0 pg    MCHC 33.3 31.5 - 36.5 g/dL    RDW 13.3 10.0 - 15.0 %    Platelet Count 190 150 - 450 10e3/uL    % Neutrophils 75 %     % Lymphocytes 17 %    % Monocytes 7 %    % Eosinophils 0 %    % Basophils 0 %    % Immature Granulocytes 1 %    NRBCs per 100 WBC 0 <1 /100    Absolute Neutrophils 9.5 (H) 1.6 - 8.3 10e3/uL    Absolute Lymphocytes 2.2 0.8 - 5.3 10e3/uL    Absolute Monocytes 0.9 0.0 - 1.3 10e3/uL    Absolute Eosinophils 0.0 0.0 - 0.7 10e3/uL    Absolute Basophils 0.0 0.0 - 0.2 10e3/uL    Absolute Immature Granulocytes 0.1 <=0.4 10e3/uL    Absolute NRBCs 0.0 10e3/uL   Extra Blue Top Tube   Result Value Ref Range    Hold Specimen JIC    Extra Red Top Tube   Result Value Ref Range    Hold Specimen JIC                Rosalie Benson PA-C  Emergency Medicine  [facility] EMERGENCY DEPARTMENT  [address] [address] [address]  [phone]  Dept: [phone]     Rosalie Benson PA-C  03/31/24 4030

## 2024-03-10 NOTE — ED TRIAGE NOTES
Pt reports hx of kidney stones, seen at urgent care 4 days ago for bilat low back, taking methocarbamol with no pain relief, now pain worse-constant. Urination is normal per pt.     Triage Assessment (Adult)       Row Name 03/10/24 1130          Triage Assessment    Airway WDL WDL        Respiratory WDL    Respiratory WDL WDL        Skin Circulation/Temperature WDL    Skin Circulation/Temperature WDL WDL        Cardiac WDL    Cardiac WDL WDL        Peripheral/Neurovascular WDL    Peripheral Neurovascular WDL WDL        Cognitive/Neuro/Behavioral WDL    Cognitive/Neuro/Behavioral WDL WDL

## 2024-03-10 NOTE — ED PROVIDER NOTES
"Emergency Department Midlevel Supervisory Note     I had a face to face encounter with this patient seen by the Advanced Practice Provider ([facility]). I personally made/approved the management plan and take responsibility for the patient management. I personally saw patient and performed a substantive portion of the visit including all aspects of the medical decision making.     ED Course:  12:09 PM Rosalie Benson PA-C staffed patient with me. I agree with their assessment and plan of management, and I will see the patient.  12:59 PM I met with the patient to introduce myself, gather additional history, perform my initial exam, and discuss the plan.     Brief HPI:     Maurice Biswas is a 68 year old male who presents for evaluation of back pain.    The patient presents with 4 days of constant lower back pain that radiates to his bilateral flanks. He has a history of back pain but it is unusual for it to be constant. He has been taking methocarbamol without relief. He has a history of kidney stones but says this pain feels different than that. He had an episode of urinary urgency this morning but no other urinary symptoms.    I, Rad Cruz, am serving as a scribe to document services personally performed by Clayton Loving MD, based on my observations and the provider's statements to me. I, Clayton Loving MD, attest that Rad Cruz is acting in a scribe capacity, has observed my performance of the services and has documented them in accordance with my direction.     Brief Physical Exam: BP (!) 146/87   Pulse 66   Temp 98.9  F (37.2  C) (Temporal)   Resp 16   Ht 1.778 m (5' 10\")   Wt 85.3 kg (188 lb)   SpO2 99%   BMI 26.98 kg/m    Constitutional:  Alert, in no acute distress  EYES: Conjunctivae clear  HENT:  Atraumatic  Respiratory:  Respirations even, unlabored, in no acute respiratory distress  Cardiovascular:  Regular rate and rhythm, good peripheral perfusion  GI: Soft, non-distended, " non-tender  Musculoskeletal:  Moves all 4 extremities equally, grossly symmetrical strength   Integument: Warm & dry. No appreciable rash, erythema.  Neurologic:  Alert & oriented, speech clear and fluent, no focal deficits noted  Psych: Normal mood and affect       MDM:  Patient is a 68-year-old male who presents to the emergency department with acute back pain for the last 4 days.  He is vitally stable when he arrives to the emergency department.  His lower extremity neurological exam is preserved and symmetric.  No specific trauma however patient found to have a L1 superior endplate compression fracture which was new from comparison imaging last year and I think is probably the cause of the patient's back pain.  No other acute process was identified on abdominal CT.  There was incidentally noted some stranding that might be suggestive of a colocolic fistula but I do not think this is an active problem.  He already sees a GI doctor and can follow this up there.  Case discussed with neurosurgery.  No immediate plan for bracing but they will see him very closely in clinic to monitor things.  Requested screening upright x-rays for follow-up.  Agree with remainder of ED course and plan as documented by [facility].      1. Compression fracture of L1 vertebra, initial encounter (H)          Labs and Imaging:  Results for orders placed or performed during the hospital encounter of 03/10/24   CT Abdomen Pelvis w/o Contrast    Impression    IMPRESSION:   1.  No urinary tract stones or hydronephrosis.  2.  Sigmoid diverticulosis. There is a bandlike curvilinear structure abutting the left side of the distal sigmoid colon with a small amount of adjacent fat stranding. These findings could reflect sequela of recent or remote acute diverticulitis. Given   its curvilinear appearance, a colocolonic fistula is a consideration.       CT Lumbar Spine Reconstructed    Impression    IMPRESSION:  1.  Mild L1 superior endplate compression  fracture, new since 01/16/2023  2.  Multilevel lower lumbar predominant spondylosis, most advanced at L5-S1.     XR Lumbar Spine 2/3 Views    Impression    IMPRESSION: Minimally depressed L1 fracture is unchanged from prior. Normal alignment. Moderate multilevel disc height loss and facet hypertrophy, greatest at L5-S1. Aortic atherosclerosis.   UA with Microscopic reflex to Culture    Specimen: Urine, Clean Catch   Result Value Ref Range    Color Urine Yellow Colorless, Straw, Light Yellow, Yellow    Appearance Urine Turbid (A) Clear    Glucose Urine Negative Negative mg/dL    Bilirubin Urine Negative Negative    Ketones Urine Negative Negative mg/dL    Specific Gravity Urine 1.029 1.001 - 1.030    Blood Urine Negative Negative    pH Urine 5.5 5.0 - 7.0    Protein Albumin Urine 20 (A) Negative mg/dL    Urobilinogen Urine <2.0 <2.0 mg/dL    Nitrite Urine Negative Negative    Leukocyte Esterase Urine Negative Negative    Mucus Urine Present (A) None Seen /LPF    RBC Urine 0 <=2 /HPF    WBC Urine 0 <=5 /HPF    Squamous Epithelials Urine <1 <=1 /HPF    Hyaline Casts Urine 2 <=2 /LPF   Basic metabolic panel   Result Value Ref Range    Sodium 142 135 - 145 mmol/L    Potassium 4.2 3.4 - 5.3 mmol/L    Chloride 104 98 - 107 mmol/L    Carbon Dioxide (CO2) 31 (H) 22 - 29 mmol/L    Anion Gap 7 7 - 15 mmol/L    Urea Nitrogen 31.4 (H) 8.0 - 23.0 mg/dL    Creatinine 1.19 (H) 0.67 - 1.17 mg/dL    GFR Estimate 66 >60 mL/min/1.73m2    Calcium 9.8 8.8 - 10.2 mg/dL    Glucose 86 70 - 99 mg/dL   CBC with platelets and differential   Result Value Ref Range    WBC Count 12.8 (H) 4.0 - 11.0 10e3/uL    RBC Count 4.73 4.40 - 5.90 10e6/uL    Hemoglobin 14.1 13.3 - 17.7 g/dL    Hematocrit 42.3 40.0 - 53.0 %    MCV 89 78 - 100 fL    MCH 29.8 26.5 - 33.0 pg    MCHC 33.3 31.5 - 36.5 g/dL    RDW 13.3 10.0 - 15.0 %    Platelet Count 190 150 - 450 10e3/uL    % Neutrophils 75 %    % Lymphocytes 17 %    % Monocytes 7 %    % Eosinophils 0 %    %  Basophils 0 %    % Immature Granulocytes 1 %    NRBCs per 100 WBC 0 <1 /100    Absolute Neutrophils 9.5 (H) 1.6 - 8.3 10e3/uL    Absolute Lymphocytes 2.2 0.8 - 5.3 10e3/uL    Absolute Monocytes 0.9 0.0 - 1.3 10e3/uL    Absolute Eosinophils 0.0 0.0 - 0.7 10e3/uL    Absolute Basophils 0.0 0.0 - 0.2 10e3/uL    Absolute Immature Granulocytes 0.1 <=0.4 10e3/uL    Absolute NRBCs 0.0 10e3/uL   Extra Blue Top Tube   Result Value Ref Range    Hold Specimen JIC    Extra Red Top Tube   Result Value Ref Range    Hold Specimen JIC        Procedures:  I was present for the key portions of procedures documented in [facility]/midlevel note, see midlevel note for further details.    Clayton Loving MD  [facility] EMERGENCY DEPARTMENT  [address] [place] [id number]  [phone]     Clayton Loving MD  03/10/24 8520

## 2024-03-11 ENCOUNTER — TELEPHONE (OUTPATIENT)
Dept: NEUROSURGERY | Facility: CLINIC | Age: 69
End: 2024-03-11
Payer: COMMERCIAL

## 2024-03-11 NOTE — TELEPHONE ENCOUNTER
[facility]. Please schedule ED follow-up in 3 weeks with [facility], repeat xray prior to appt. L1 Compression Fracture.

## 2024-03-12 NOTE — TELEPHONE ENCOUNTER
Attempted to reach out to patient, no answer. Left voice message for them to call clinic back to further discuss.     Seen in consultation in the ED for back pain, found to have L1 compression fx  No immediate bracing per nsgy providers  Follow up in 3 wks with upright xray prior     Assessment: in attempts to make 3 wk follow up appt, pt reports concerns with the timeframe in which he is asked to follow up, asks for brace and reports bowel changes.     Recommendation given: tbd    Action needed from provider: tbd

## 2024-03-12 NOTE — TELEPHONE ENCOUNTER
"[facility] Call Center    Phone Message    May a detailed message be left on voicemail: yes     Reason for Call: Other: Patient returned call, writer attempted to schedule but patient stated he expected to be scheduled much sooner than 3 weeks. He states \"they want to get it immobilized so it does not do more damage as soon as possible\". He states someone had mentioned a back brace when he was in the unit. Patient states he has had bowel changes, and had diarrhea all night, he states no other symptoms have come on suddenly but that the Oxycodone he was given is not helping.     Action Taken: Message routed to:  Other: Spine & Brain    Travel Screening: Not Applicable                                                                   "

## 2024-03-13 ENCOUNTER — TELEPHONE (OUTPATIENT)
Dept: NEUROSURGERY | Facility: CLINIC | Age: 69
End: 2024-03-13
Payer: COMMERCIAL

## 2024-03-13 NOTE — TELEPHONE ENCOUNTER
"Placed call to pt -    Last Visit:     Seen in consultation in the ED for back pain, found to have L1 compression fx  Next Visit: TDB > 3 arnold weeks    Name of Provider:  MEAGHAN De Guzman CNP     Assessment:     in attempts to make 3 wk follow up appt, pt reports concerns with the timeframe in which he is asked to follow up, asks for brace and reports bowel changes.      No immediate bracing per nsgy providers  Follow up in 3 wks with upright xray prior    Pt starts phone call yelling at writer. States oxycodone is not helping and he ran out. Updated to contact PCP for refills or other medication management options as we have not seen him in clinic. He states that he has had 3 surgeries and never used oxycodone, he states it \"does nothing\" for him at this time. Pt states ER provider told him he needed a brace, updated him that per Dr. Loving's note there was no plan for immediate bracing. Pt very frustrated with answer.     Pt asking why we \"expect him to suffer for 3 weeks and then only get an xray\". Updated pt that is not the plan of care and we want him to have tolerable levels of pain. Updated him that spinal fractures take time to heal, our standard POC is to allow for a few weeks to see if the spine will start to heal on its own and confirm with XR. Offered to update the provider team to see if we can move up the timeframe or if they have any other recc for pt at this time. Pt updated again that we have not seen him in clinic so we are limited in what we can do until we see and evaluate him in clinic.     Pt states he plans to discuss who is in network with his insurance so he can be seen elsewhere. Pt states \"I'm not getting any care here so I'm going elsewhere\". When writer tried to explain to pt that we can update the team and if he has further q/concerns to contact us, pt terminated the phone call. Unable to make any further recc.           "

## 2024-03-15 LAB
ACID FAST STAIN (ARUP): NORMAL

## 2024-04-01 DIAGNOSIS — G70.00 MYASTHENIA GRAVIS (H): Primary | ICD-10-CM

## 2024-04-01 NOTE — TELEPHONE ENCOUNTER
[facility] Call Center    Phone Message    May a detailed message be left on voicemail: yes     Reason for Call: Other: Pt calling in regards to 01/10 appt. Pt states they have been having little to no side effects since reducing pyRIDostigmine (MESTINON) 60 MG and predniSONE (DELTASONE) 20 MG medications. Pt would like to discuss reducing medications even further.    Please contact pt at [phone].    Action Taken: Message routed to:  Other: [facility]    Travel Screening: Not Applicable

## 2024-04-01 NOTE — TELEPHONE ENCOUNTER
If patient is doing well I would recommend the following  Prednisone 60 mg on the odd day.  Prednisone 10 mg on the even day (decreased dose 4/2/2024)    Continue pyridostigmine (Mestinon, the same)    Call in 1 month and if stable we would consider decreasing prednisone on the odd day a little bit  Keep follow-up visit 7/15/2024.  leola Alamo MD on 4/1/2024 at 5:41 PM

## 2024-04-02 RX ORDER — PREDNISONE 10 MG/1
TABLET ORAL
Qty: 45 TABLET | Refills: 3 | Status: SHIPPED | OUTPATIENT
Start: 2024-04-02 | End: 2024-04-29

## 2024-04-02 NOTE — TELEPHONE ENCOUNTER
[facility] Call Center    Phone Message    May a detailed message be left on voicemail: yes     Reason for Call: Other: Pt was returning a call from [facility]. Writer was unsuccessful in reaching [facility]. Please return Pts call at  [phone].    Action Taken: Message routed to:  Other: [facility]     Travel Screening: Not Applicable

## 2024-04-02 NOTE — TELEPHONE ENCOUNTER
Spoke to pt, relayed Dr. Alamo recommendations. Pt verbalizes understanding.    Pt would like rx for prednisone 10mg tab sent to [facility] pharmacy in [place] instead of having to cut the 20mg tablets in half.  Prednisone 10mg tablets pended for your review and approval.    Aurora Lorenzo LPN on 4/2/2024 at 2:13 PM

## 2024-04-19 ENCOUNTER — TELEPHONE (OUTPATIENT)
Dept: NEUROLOGY | Facility: CLINIC | Age: 69
End: 2024-04-19
Payer: COMMERCIAL

## 2024-04-19 DIAGNOSIS — G70.00 MYASTHENIA GRAVIS (H): ICD-10-CM

## 2024-04-19 NOTE — TELEPHONE ENCOUNTER
[facility] Call Center    Phone Message    May a detailed message be left on voicemail: yes     Reason for Call: Pt requesting a clal back to talk about weaning off the medication prednisone. Please call Maurice at [phone] to discuss further.    Action Taken: Message routed to:  Other: [facility]    Travel Screening: Not Applicable

## 2024-04-22 NOTE — TELEPHONE ENCOUNTER
It is true that osteoporosis/soft bones is a side effect from the prednisone.  Due to his myasthenia gravis though the medication was necessary to help stabilize his condition.    Patient should follow-up/check with his primary MD to help with treatment of osteoporosis    Prednisone needs to be tapered slowly though both so that his myasthenia gravis does not flareup and also to prevent side effects from adrenal insufficiency.    Patient's prednisone dose was just decreased 4/2/2024 I thought we were going to wait a month before making further changes.    If patient is doing very well I would recommend the following.  Prednisone  50 mg once per day on the day  Prednisone 10 mg once per day on the even day.    Patient will need a combination of 10 mg and 20 mg prednisone tablets.  Probably need to update the prednisone 10 mg prescription so that he can take 1 tablet every day.  Prednisone 20 mg tablets would be 2 tablets on the  odd day.  leola Alamo MD on 4/22/2024 at 4:47 PM

## 2024-04-22 NOTE — TELEPHONE ENCOUNTER
Called and spoke with patient (calling from further tapering down of prednisone dosing).    Per patient currently taking as discussed on 4/1 TE:  If patient is doing well I would recommend the following  Prednisone 60 mg on the odd day.  Prednisone 10 mg on the even day (decreased dose 4/2/2024)     Continue pyridostigmine (Mestinon, the same)     Call in 1 month and if stable we would consider decreasing prednisone on the odd day a little bit      Patient also wants to note they have concerns that frequent use of prednisone and high dosing is contributing to recent bone density issues and multiple back fractures in relation.    MD to please advise on prednisone changes.      Gerald Dubon, RN, BSN  [facility]

## 2024-04-23 NOTE — TELEPHONE ENCOUNTER
Called and left message to discuss provider recommendations. Requested call back from patient.    Gerald Dubon RN, BSN  [facility]

## 2024-04-29 RX ORDER — PREDNISONE 20 MG/1
TABLET ORAL
Qty: 90 TABLET | Refills: 11 | Status: SHIPPED | OUTPATIENT
Start: 2024-04-29 | End: 2024-07-01

## 2024-04-29 RX ORDER — PREDNISONE 10 MG/1
TABLET ORAL
Qty: 90 TABLET | Refills: 3 | Status: SHIPPED | OUTPATIENT
Start: 2024-04-29 | End: 2024-07-01

## 2024-04-29 NOTE — TELEPHONE ENCOUNTER
"Called and discussed provider recommendations with regards to prednisone and concerns about osteoporosis. Following with [facility] for bone density and treatment options.    Patient states understanding and reports doing well (per provider, \"If patient is doing very well I would recommend the following.  Prednisone  50 mg once per day on the day  Prednisone 10 mg once per day on the even day.\")    They are agreeable with plan.      New Rx's sent in with adjusted dosing as described.    Signed Prescriptions:                        Disp   Refills    predniSONE (DELTASONE) 20 MG tablet        90 tab*11       Sig: Take 2 tablets every other day (Take with 10 mg tab           to equal (50mg) on odd days).  Authorizing Provider: NOE COWAN  Ordering User: MALENA JACOB    predniSONE (DELTASONE) 10 MG tablet        90 tab*3        Sig: Take one tablet daily. Take only 10mg on even days.           Take with 40mg equaling (50mg) on odd days.  Authorizing Provider: NOE COWAN  Ordering User: MALENA JACOB RN, BSN  [facility]    "

## 2024-04-30 ENCOUNTER — HOSPITAL ENCOUNTER (OUTPATIENT)
Facility: HOSPITAL | Age: 69
Setting detail: OBSERVATION
Discharge: HOME | End: 2024-05-01
Attending: EMERGENCY MEDICINE | Admitting: EMERGENCY MEDICINE
Payer: COMMERCIAL

## 2024-04-30 DIAGNOSIS — M54.50 ACUTE MIDLINE LOW BACK PAIN WITHOUT SCIATICA: ICD-10-CM

## 2024-04-30 PROCEDURE — 99285 EMERGENCY DEPT VISIT HI MDM: CPT

## 2024-04-30 PROCEDURE — 99285 EMERGENCY DEPT VISIT HI MDM: CPT | Mod: 25

## 2024-04-30 ASSESSMENT — ACTIVITIES OF DAILY LIVING (ADL): ADLS_ACUITY_SCORE: 35

## 2024-05-01 VITALS
TEMPERATURE: 97.6 F | SYSTOLIC BLOOD PRESSURE: 146 MMHG | HEART RATE: 60 BPM | OXYGEN SATURATION: 96 % | DIASTOLIC BLOOD PRESSURE: 75 MMHG | RESPIRATION RATE: 14 BRPM

## 2024-05-01 PROBLEM — M54.50 ACUTE MIDLINE LOW BACK PAIN WITHOUT SCIATICA: Status: ACTIVE | Noted: 2024-05-01

## 2024-05-01 PROBLEM — M54.9 BACK PAIN: Status: ACTIVE | Noted: 2024-05-01

## 2024-05-01 LAB
ALBUMIN SERPL BCG-MCNC: 3.7 G/DL (ref 3.5–5.2)
ALP SERPL-CCNC: 62 U/L (ref 40–150)
ALT SERPL W P-5'-P-CCNC: 28 U/L (ref 0–70)
ANION GAP SERPL CALCULATED.3IONS-SCNC: 10 MMOL/L (ref 7–15)
ANION GAP SERPL CALCULATED.3IONS-SCNC: 13 MMOL/L (ref 7–15)
AST SERPL W P-5'-P-CCNC: 14 U/L (ref 0–45)
BASOPHILS # BLD AUTO: 0 10E3/UL (ref 0–0.2)
BASOPHILS NFR BLD AUTO: 0 %
BILIRUB SERPL-MCNC: 0.6 MG/DL
BUN SERPL-MCNC: 34.8 MG/DL (ref 8–23)
BUN SERPL-MCNC: 39.1 MG/DL (ref 8–23)
CALCIUM SERPL-MCNC: 10.3 MG/DL (ref 8.8–10.2)
CALCIUM SERPL-MCNC: 9 MG/DL (ref 8.8–10.2)
CHLORIDE SERPL-SCNC: 102 MMOL/L (ref 98–107)
CHLORIDE SERPL-SCNC: 106 MMOL/L (ref 98–107)
CREAT SERPL-MCNC: 1.44 MG/DL (ref 0.67–1.17)
CREAT SERPL-MCNC: 1.87 MG/DL (ref 0.67–1.17)
DEPRECATED HCO3 PLAS-SCNC: 25 MMOL/L (ref 22–29)
DEPRECATED HCO3 PLAS-SCNC: 26 MMOL/L (ref 22–29)
EGFRCR SERPLBLD CKD-EPI 2021: 38 ML/MIN/1.73M2
EGFRCR SERPLBLD CKD-EPI 2021: 53 ML/MIN/1.73M2
EOSINOPHIL # BLD AUTO: 0 10E3/UL (ref 0–0.7)
EOSINOPHIL NFR BLD AUTO: 0 %
ERYTHROCYTE [DISTWIDTH] IN BLOOD BY AUTOMATED COUNT: 13.2 % (ref 10–15)
ERYTHROCYTE [DISTWIDTH] IN BLOOD BY AUTOMATED COUNT: 13.2 % (ref 10–15)
GLUCOSE BLDC GLUCOMTR-MCNC: 100 MG/DL (ref 70–99)
GLUCOSE BLDC GLUCOMTR-MCNC: 90 MG/DL (ref 70–99)
GLUCOSE BLDC GLUCOMTR-MCNC: 91 MG/DL (ref 70–99)
GLUCOSE SERPL-MCNC: 80 MG/DL (ref 70–99)
GLUCOSE SERPL-MCNC: 91 MG/DL (ref 70–99)
HBA1C MFR BLD: 5 %
HCT VFR BLD AUTO: 35.1 % (ref 40–53)
HCT VFR BLD AUTO: 39.6 % (ref 40–53)
HGB BLD-MCNC: 11.5 G/DL (ref 13.3–17.7)
HGB BLD-MCNC: 12.9 G/DL (ref 13.3–17.7)
IMM GRANULOCYTES # BLD: 0.1 10E3/UL
IMM GRANULOCYTES NFR BLD: 1 %
LYMPHOCYTES # BLD AUTO: 2.1 10E3/UL (ref 0.8–5.3)
LYMPHOCYTES NFR BLD AUTO: 25 %
MCH RBC QN AUTO: 29.7 PG (ref 26.5–33)
MCH RBC QN AUTO: 30.1 PG (ref 26.5–33)
MCHC RBC AUTO-ENTMCNC: 32.6 G/DL (ref 31.5–36.5)
MCHC RBC AUTO-ENTMCNC: 32.8 G/DL (ref 31.5–36.5)
MCV RBC AUTO: 91 FL (ref 78–100)
MCV RBC AUTO: 92 FL (ref 78–100)
MONOCYTES # BLD AUTO: 0.6 10E3/UL (ref 0–1.3)
MONOCYTES NFR BLD AUTO: 7 %
NEUTROPHILS # BLD AUTO: 5.7 10E3/UL (ref 1.6–8.3)
NEUTROPHILS NFR BLD AUTO: 67 %
NRBC # BLD AUTO: 0 10E3/UL
NRBC BLD AUTO-RTO: 0 /100
PLATELET # BLD AUTO: 155 10E3/UL (ref 150–450)
PLATELET # BLD AUTO: 177 10E3/UL (ref 150–450)
POTASSIUM SERPL-SCNC: 3.6 MMOL/L (ref 3.4–5.3)
POTASSIUM SERPL-SCNC: 3.9 MMOL/L (ref 3.4–5.3)
PROT SERPL-MCNC: 5.7 G/DL (ref 6.4–8.3)
RBC # BLD AUTO: 3.82 10E6/UL (ref 4.4–5.9)
RBC # BLD AUTO: 4.34 10E6/UL (ref 4.4–5.9)
SODIUM SERPL-SCNC: 140 MMOL/L (ref 135–145)
SODIUM SERPL-SCNC: 142 MMOL/L (ref 135–145)
WBC # BLD AUTO: 12.4 10E3/UL (ref 4–11)
WBC # BLD AUTO: 8.5 10E3/UL (ref 4–11)

## 2024-05-01 PROCEDURE — 80048 BASIC METABOLIC PNL TOTAL CA: CPT | Performed by: EMERGENCY MEDICINE

## 2024-05-01 PROCEDURE — 83036 HEMOGLOBIN GLYCOSYLATED A1C: CPT | Performed by: INTERNAL MEDICINE

## 2024-05-01 PROCEDURE — 250N000012 HC RX MED GY IP 250 OP 636 PS 637: Performed by: INTERNAL MEDICINE

## 2024-05-01 PROCEDURE — 85025 COMPLETE CBC W/AUTO DIFF WBC: CPT | Performed by: EMERGENCY MEDICINE

## 2024-05-01 PROCEDURE — 36415 COLL VENOUS BLD VENIPUNCTURE: CPT | Performed by: INTERNAL MEDICINE

## 2024-05-01 PROCEDURE — 250N000011 HC RX IP 250 OP 636: Performed by: INTERNAL MEDICINE

## 2024-05-01 PROCEDURE — 82962 GLUCOSE BLOOD TEST: CPT

## 2024-05-01 PROCEDURE — 99418 PROLNG IP/OBS E/M EA 15 MIN: CPT | Performed by: INTERNAL MEDICINE

## 2024-05-01 PROCEDURE — 250N000013 HC RX MED GY IP 250 OP 250 PS 637: Performed by: INTERNAL MEDICINE

## 2024-05-01 PROCEDURE — G0378 HOSPITAL OBSERVATION PER HR: HCPCS

## 2024-05-01 PROCEDURE — 80053 COMPREHEN METABOLIC PANEL: CPT | Performed by: EMERGENCY MEDICINE

## 2024-05-01 PROCEDURE — 96374 THER/PROPH/DIAG INJ IV PUSH: CPT

## 2024-05-01 PROCEDURE — 96375 TX/PRO/DX INJ NEW DRUG ADDON: CPT

## 2024-05-01 PROCEDURE — 99236 HOSP IP/OBS SAME DATE HI 85: CPT | Performed by: INTERNAL MEDICINE

## 2024-05-01 PROCEDURE — 99207 PR APP CREDIT; MD BILLING SHARED VISIT: CPT | Performed by: INTERNAL MEDICINE

## 2024-05-01 PROCEDURE — 85027 COMPLETE CBC AUTOMATED: CPT | Mod: 91 | Performed by: INTERNAL MEDICINE

## 2024-05-01 RX ORDER — TIZANIDINE 2 MG/1
2 TABLET ORAL EVERY 6 HOURS PRN
Status: DISCONTINUED | OUTPATIENT
Start: 2024-05-01 | End: 2024-05-01 | Stop reason: HOSPADM

## 2024-05-01 RX ORDER — OXYCODONE HYDROCHLORIDE 5 MG/1
5 TABLET ORAL EVERY 6 HOURS PRN
Qty: 12 TABLET | Refills: 0 | Status: SHIPPED | OUTPATIENT
Start: 2024-05-01 | End: 2024-05-04

## 2024-05-01 RX ORDER — LIDOCAINE 40 MG/G
CREAM TOPICAL
Status: DISCONTINUED | OUTPATIENT
Start: 2024-05-01 | End: 2024-05-01 | Stop reason: HOSPADM

## 2024-05-01 RX ORDER — PYRIDOSTIGMINE BROMIDE 60 MG/1
60 TABLET ORAL 2 TIMES DAILY
Status: DISCONTINUED | OUTPATIENT
Start: 2024-05-01 | End: 2024-05-01 | Stop reason: HOSPADM

## 2024-05-01 RX ORDER — CALCIUM CARBONATE 500 MG/1
1000 TABLET, CHEWABLE ORAL 4 TIMES DAILY PRN
Status: DISCONTINUED | OUTPATIENT
Start: 2024-05-01 | End: 2024-05-01 | Stop reason: HOSPADM

## 2024-05-01 RX ORDER — TRAMADOL HYDROCHLORIDE 50 MG/1
50 TABLET ORAL
Status: DISCONTINUED | OUTPATIENT
Start: 2024-05-01 | End: 2024-05-01 | Stop reason: HOSPADM

## 2024-05-01 RX ORDER — OXYCODONE HYDROCHLORIDE 5 MG/1
5 TABLET ORAL EVERY 4 HOURS PRN
Status: DISCONTINUED | OUTPATIENT
Start: 2024-05-01 | End: 2024-05-01 | Stop reason: HOSPADM

## 2024-05-01 RX ORDER — HYDROMORPHONE HYDROCHLORIDE 1 MG/ML
0.3 INJECTION, SOLUTION INTRAMUSCULAR; INTRAVENOUS; SUBCUTANEOUS
Status: DISCONTINUED | OUTPATIENT
Start: 2024-05-01 | End: 2024-05-01 | Stop reason: HOSPADM

## 2024-05-01 RX ORDER — NALOXONE HYDROCHLORIDE 0.4 MG/ML
0.2 INJECTION, SOLUTION INTRAMUSCULAR; INTRAVENOUS; SUBCUTANEOUS
Status: DISCONTINUED | OUTPATIENT
Start: 2024-05-01 | End: 2024-05-01 | Stop reason: HOSPADM

## 2024-05-01 RX ORDER — NICOTINE POLACRILEX 4 MG
15-30 LOZENGE BUCCAL
Status: DISCONTINUED | OUTPATIENT
Start: 2024-05-01 | End: 2024-05-01 | Stop reason: HOSPADM

## 2024-05-01 RX ORDER — AMLODIPINE BESYLATE 5 MG/1
10 TABLET ORAL DAILY
Status: DISCONTINUED | OUTPATIENT
Start: 2024-05-01 | End: 2024-05-01 | Stop reason: HOSPADM

## 2024-05-01 RX ORDER — NALOXONE HYDROCHLORIDE 0.4 MG/ML
0.4 INJECTION, SOLUTION INTRAMUSCULAR; INTRAVENOUS; SUBCUTANEOUS
Status: DISCONTINUED | OUTPATIENT
Start: 2024-05-01 | End: 2024-05-01 | Stop reason: HOSPADM

## 2024-05-01 RX ORDER — ONDANSETRON 4 MG/1
4 TABLET, ORALLY DISINTEGRATING ORAL EVERY 6 HOURS PRN
Status: DISCONTINUED | OUTPATIENT
Start: 2024-05-01 | End: 2024-05-01 | Stop reason: HOSPADM

## 2024-05-01 RX ORDER — AMOXICILLIN 250 MG
1 CAPSULE ORAL 2 TIMES DAILY PRN
Status: DISCONTINUED | OUTPATIENT
Start: 2024-05-01 | End: 2024-05-01 | Stop reason: HOSPADM

## 2024-05-01 RX ORDER — NICOTINE POLACRILEX 4 MG
15-30 LOZENGE BUCCAL
Status: DISCONTINUED | OUTPATIENT
Start: 2024-05-01 | End: 2024-05-01

## 2024-05-01 RX ORDER — TRAMADOL HYDROCHLORIDE 50 MG/1
1 TABLET ORAL
COMMUNITY
Start: 2024-04-22 | End: 2024-07-01

## 2024-05-01 RX ORDER — LOSARTAN POTASSIUM 50 MG/1
100 TABLET ORAL DAILY
Status: DISCONTINUED | OUTPATIENT
Start: 2024-05-01 | End: 2024-05-01 | Stop reason: HOSPADM

## 2024-05-01 RX ORDER — FLUOCINONIDE TOPICAL SOLUTION USP, 0.05% 0.5 MG/ML
SOLUTION TOPICAL DAILY PRN
COMMUNITY
Start: 2024-02-19

## 2024-05-01 RX ORDER — DEXTROSE MONOHYDRATE 25 G/50ML
25-50 INJECTION, SOLUTION INTRAVENOUS
Status: DISCONTINUED | OUTPATIENT
Start: 2024-05-01 | End: 2024-05-01 | Stop reason: HOSPADM

## 2024-05-01 RX ORDER — PREDNISONE 10 MG/1
10 TABLET ORAL EVERY OTHER DAY
Status: DISCONTINUED | OUTPATIENT
Start: 2024-05-02 | End: 2024-05-01 | Stop reason: HOSPADM

## 2024-05-01 RX ORDER — AMOXICILLIN 250 MG
2 CAPSULE ORAL 2 TIMES DAILY PRN
Status: DISCONTINUED | OUTPATIENT
Start: 2024-05-01 | End: 2024-05-01 | Stop reason: HOSPADM

## 2024-05-01 RX ORDER — DEXTROSE MONOHYDRATE 25 G/50ML
25-50 INJECTION, SOLUTION INTRAVENOUS
Status: DISCONTINUED | OUTPATIENT
Start: 2024-05-01 | End: 2024-05-01

## 2024-05-01 RX ORDER — MYCOPHENOLATE MOFETIL 250 MG/1
750 CAPSULE ORAL 2 TIMES DAILY
Status: DISCONTINUED | OUTPATIENT
Start: 2024-05-01 | End: 2024-05-01 | Stop reason: HOSPADM

## 2024-05-01 RX ORDER — LEVOTHYROXINE SODIUM 112 UG/1
112 TABLET ORAL
Status: DISCONTINUED | OUTPATIENT
Start: 2024-05-01 | End: 2024-05-01 | Stop reason: HOSPADM

## 2024-05-01 RX ORDER — ONDANSETRON 2 MG/ML
4 INJECTION INTRAMUSCULAR; INTRAVENOUS EVERY 6 HOURS PRN
Status: DISCONTINUED | OUTPATIENT
Start: 2024-05-01 | End: 2024-05-01 | Stop reason: HOSPADM

## 2024-05-01 RX ORDER — OXYCODONE HYDROCHLORIDE 5 MG/1
10 TABLET ORAL EVERY 4 HOURS PRN
Status: DISCONTINUED | OUTPATIENT
Start: 2024-05-01 | End: 2024-05-01 | Stop reason: HOSPADM

## 2024-05-01 RX ADMIN — PYRIDOSTIGMINE BROMIDE 60 MG: 60 TABLET ORAL at 09:12

## 2024-05-01 RX ADMIN — FAMOTIDINE 20 MG: 10 INJECTION, SOLUTION INTRAVENOUS at 01:38

## 2024-05-01 RX ADMIN — HYDROMORPHONE HYDROCHLORIDE 0.3 MG: 1 INJECTION, SOLUTION INTRAMUSCULAR; INTRAVENOUS; SUBCUTANEOUS at 10:34

## 2024-05-01 RX ADMIN — PREDNISONE 50 MG: 20 TABLET ORAL at 09:07

## 2024-05-01 RX ADMIN — MYCOPHENOLATE MOFETIL 750 MG: 250 CAPSULE ORAL at 09:10

## 2024-05-01 RX ADMIN — LOSARTAN POTASSIUM 100 MG: 50 TABLET, FILM COATED ORAL at 09:09

## 2024-05-01 RX ADMIN — LEVOTHYROXINE SODIUM 112 MCG: 0.11 TABLET ORAL at 09:10

## 2024-05-01 RX ADMIN — AMLODIPINE BESYLATE 10 MG: 5 TABLET ORAL at 09:08

## 2024-05-01 ASSESSMENT — ACTIVITIES OF DAILY LIVING (ADL)
ADLS_ACUITY_SCORE: 37
ADLS_ACUITY_SCORE: 35
ADLS_ACUITY_SCORE: 37

## 2024-05-01 NOTE — MEDICATION SCRIBE - ADMISSION MEDICATION HISTORY
Medication Scribe Admission Medication History    Admission medication history is complete. The information provided in this note is only as accurate as the sources available at the time of the update.    Information Source(s): Patient via in-person    Pertinent Information: Patient takes Prednisone 10 mg every day and another 40 mg (total dose of 50 mg) on odd days.    Changes made to PTA medication list:  Added: Tramadol, Lidex (per patient and fill history)  Deleted: Oxycodone (per patient)  Changed: None    Allergies reviewed with patient and updates made in EHR: yes    Medication History Completed By: Magy Liz 5/1/2024 1:19 AM    PTA Med List   Medication Sig Last Dose    amLODIPine (NORVASC) 10 MG tablet Take 1 tablet (10 mg) by mouth daily 4/30/2024 at am    Calcium Carbonate-Vitamin D (OYSTER SHELL CALCIUM/D) 500-5 MG-MCG TABS Take 1 tablet by mouth 2 times daily (with meals) 4/30/2024 at am    fish oil-omega-3 fatty acids 1000 MG capsule Take 2 capsules by mouth 2 times daily 4/30/2024 at am    fluocinonide (LIDEX) 0.05 % external solution Apply topically daily as needed (psoriasis) Unknown at prn    fluticasone (FLONASE) 50 mcg/actuation nasal spray [FLUTICASONE (FLONASE) 50 MCG/ACTUATION NASAL SPRAY] 2 sprays into each nostril daily as needed for rhinitis. Unknown at prn    levothyroxine (SYNTHROID/LEVOTHROID) 112 MCG tablet Take 112 mcg by mouth daily 4/30/2024 at am    losartan (COZAAR) 100 MG tablet Take 1 tablet (100 mg) by mouth daily 4/30/2024 at am    mycophenolate (GENERIC EQUIVALENT) 500 MG tablet Take 1.5 tablets (750 mg) by mouth 2 times daily 4/30/2024 at pm    predniSONE (DELTASONE) 10 MG tablet Take one tablet daily. Take only 10mg on even days. Take with 40mg equaling (50mg) on odd days. 4/30/2024 at am    predniSONE (DELTASONE) 20 MG tablet Take 2 tablets every other day (Take with 10 mg tab to equal (50mg) on odd days). 4/29/2024 at am    pyRIDostigmine (MESTINON) 60 MG tablet  One po qam and one 1 pm 4/30/2024 at pm    traMADol (ULTRAM) 50 MG tablet Take 1 tablet by mouth 2 times daily 4/30/2024 at am

## 2024-05-01 NOTE — CONSULTS
ORTHOPEDIC CONSULTATION    Consultation  Maurice Biswas,  1955, MRN [id number]    Acute midline low back pain without sciatica  Back pain    PCP: Esa Valdivia, [phone]   Code status:  Full Code       Extended Emergency Contact Information  Primary Emergency Contact: Naomi Biswas  Address: [address] [place] [id number] [facility]  Home Phone: [phone]  Mobile Phone: [phone]  Relation: Spouse  Secondary Emergency Contact: Maurice Biswas Jr   [facility]  Home Phone: [phone]  Relation: Son         IMPRESSION:  L1 vertebral compression fracture status post kyphoplasty with subsequent refracture, currently admitted for pain control.  No new concerning radiculopathy or neurological compromise     PLAN:  This patient was discussed with Dr. Bush, on-call surgeon for [facility] and they are in agreement with the following plan.  -No indication for urgent surgical intervention at this time.  No emergent conditions or neurovascular compromise.  Will plan for nonoperative treatment of patient's L1 compression fracture and ongoing pain.  -Continue to utilize TLSO brace, brace to be on at all times besides skin checks and hygiene cares  -If pain is well-controlled then he should discharge and follow-up with Dr. Bush as an outpatient for further management of the fracture.  Patient using tramadol and Tylenol outpatient which has not been helping much.  Would recommend adding on a muscle relaxer today as well as use of opioid pain medications to use judiciously for pain control.  If pain is controlled on oral medications he should be okay to discharge.  - Dr. Bush will organize follow up when he discharges  - Will continue to follow while here    Thank you for including [facility] in the care of Maurice Biswas. It has been a pleasure participating in their care.        CHIEF COMPLAINT: <principal problem not specified>    HISTORY OF PRESENT ILLNESS:  The patient  is seen in orthopedic consultation at the request of Tamar Martinez MD for low back pain.  The patient is a 68 year old male       The patient presents today with low back pain due to L1 compression fracture.  He has been seeing Dr. Francisco Bush is team as an outpatient for management of this issue.  He had a kyphoplasty of the L1 vertebrae on 3/28/2024.  Medially afterwards he had some good relief but then pain gradually returned and worsened afterwards.  Typically pain has been in his right-sided low back area.  In the last 4 to 5 days he has developed more pain on the left side that has become so severe that he has been unable to tolerate the pain at home and came in to the emergency department and was admitted for pain control.  He is not having any radiating pain at this time or numbness/tingling in his legs besides baseline neuropathy in his feet.  He denies any bowel/bladder change.  He denies fever/chills.  He does have myasthenia gravis as been on oral steroids 60 mg/day for about 9 months.  He is working on getting a bone density scan and follow-up with an osteoporosis specialist.    ALLERGIES:   Review of patient's allergies indicates   Allergies   Allergen Reactions    Doxycycline Muscle Pain (Myalgia) and Other (See Comments)     Brought on Myasthenia Gravis         MEDICATIONS UPON ADMISSION:  Medications were reviewed.  They include:   (Not in a hospital admission)        SOCIAL HISTORY:   he  reports that he has never smoked. He has never used smokeless tobacco. He reports that he does not drink alcohol and does not use drugs.      FAMILY HISTORY:  family history is not on file.      REVIEW OF SYSTEMS:   Reviewed with patient. See HPI, otherwise negative       PHYSICAL EXAMINATION:  Vitals: BP (!) 151/78   Pulse 64   Temp 97.6  F (36.4  C) (Oral)   Resp 10   SpO2 97%   General: On examination, the patient is resting comfortably, NAD, awake, and alert and oriented to person, place, time,  and, and general circumstances   SKIN: There is no evidence of erythema, warmth, swelling, deformity, crepitus, break to the skin, open wound.  Pulses:  Dorsalis pedis pulse is intact and equal bilaterally  Sensation: intact and equal bilaterally to the distal lower extremities.  Tenderness: NTTP of the lumbar spinous process, lumbar paraspinous muscle, sacrum, SI joint, greater trochanter.  Motor:   LLE:   5/5 quad  5/5 hip flexors  5/5 gastroc  5/5 tib ant  5/5 ehl  RLE:   5/5 quads  5/5 hip flexors  5/5 gastroc  5/5 tib ant  5/5 ehl            RADIOGRAPHIC EVALUATION:  Personally reviewed  EXAM: XR LUMBAR SPINE 2/3 VIEWS  LOCATION: [facility]  DATE: 3/10/2024     INDICATION: Known L1 fracture.  COMPARISON: CT lumbar spine 03/10/2024                                                                      IMPRESSION: Minimally depressed L1 fracture is unchanged from prior. Normal alignment. Moderate multilevel disc height loss and facet hypertrophy, greatest at L5-S1. Aortic atherosclerosis.      EXAM: CT LUMBAR SPINE RECONSTRUCTED  LOCATION: [facility]  DATE: 03/10/2024     INDICATION: Low back pain.  COMPARISON: CT abdomen and pelvis 01/16/2023.  TECHNIQUE: Routine CT Lumbar Spine without IV contrast. Multiplanar reformats. Dose reduction techniques were used.      FINDINGS:  VERTEBRA: Five lumbar-type vertebrae counting down from the presumed twelfth ribs. New mild L1 superior endplate compression fracture with approximately 10-20% loss of vertebral body height. No visible fracture line or associated paravertebral soft   tissue edema. No retropulsion of fracture fragments or fracture extension through the posterior elements. No associated destructive bone lesion or soft tissue mass. Normal alignment of the lumbar vertebrae. Normal lumbar lordosis. Multilevel lower lumbar   predominant spondylosis with loss of disc height, disc bulge formation and bilateral facet  arthrosis. Degenerative opposing endplate sclerosis and vacuum disc phenomenon at L5-S1.     CANAL/FORAMINA: Moderate left and mild to moderate right neural foraminal stenosis at L5-S1. Mild to moderate bilateral neural foraminal stenosis at L4-L5. Mild spinal canal stenosis L4-S1.     PARASPINAL: Mild to moderate symmetric lower lumbar predominant dorsal paraspinous muscular atrophy. Colonic diverticulosis.     Mild to moderate degenerative changes of the sacroiliac joints.                                                                      IMPRESSION:  1.  Mild L1 superior endplate compression fracture, new since 01/16/2023  2.  Multilevel lower lumbar predominant spondylosis, most advanced at L5-S1.    PERTINENT LABS:  Personally reviewed  Recent Labs   Lab Test 05/01/24  0831   HGB 11.5*      }      IVIS VALLADARES PA-C  Date: 5/1/2024  Time: 12:10 PM  [facility]    CC1:   Tamar Martinez MD

## 2024-05-01 NOTE — ED TRIAGE NOTES
Patient presents to ER with spouse.  Had a L1 fracture in March 2023, had surgery March 28th through [facility] ([facility]  Had some severe pain, went back and had an MRI on approx April 16th and was told he re-fractured it again.  Since then continues to have severe pain.      Has tried various different pain meds and states it's not working.     Denies numbness/tingling. Currently wears back brace.

## 2024-05-01 NOTE — DISCHARGE SUMMARY
[facility]  Hospitalist Discharge Summary      Date of Admission:  4/30/2024  Date of Discharge:  5/1/2024  Discharging Provider: Tamar Martinez MD  Discharge Service: Hospitalist Service    Discharge Diagnoses   Acute on chronic back pain   L1 vertebral compression fracture status post kyphoplasty with subsequent refracture.   Myasthenia gravis  Psoriatic arthritis    Clinically Significant Risk Factors          Follow-ups Needed After Discharge   Follow-up Appointments     Follow-up and recommended labs and tests       Follow up with primary care provider, Esa Valdivia, within 7 days for   hospital follow- up.  The following labs/tests are recommended: BMP.    Monitor BMP and adjust dosage of losartan as needed.  Outpatient follow-up at the nephrology clinic as arranged.  Follow-up at the orthopedic surgery clinic as arranged.          Discharge Disposition   Discharged to home  Condition at discharge: Stable    Hospital Course   68-year-old man with history of hypertension, IgA nephropathy, stage III CKD, myasthenia gravis, and psoriatic arthritis admitted on 4/30/2024 with acute on chronic back pain secondary to L1 vertebral compression fracture status post kyphoplasty on 3/28/2024 with subsequent refracture.     Back pain improved with analgesics.  Orthopedic surgery service recommends conservative management with TLSO brace and outpatient follow-up at the orthopedic surgery clinic.    Initial creatinine on admission was 1.87 with a repeat of 1.44 on 5/1/2024.  Per chart review, baseline creatinine is around 1.2-1.9 per primary nephrologist, Dr. Mcfarlane.  Patient has an upcoming appointment with nephrologist in May 2024.    Symptoms have improved and patient is clinically stable for discharge at this time.    Consultations This Hospital Stay   ORTHOPEDIC SURGERY IP CONSULT  PHARMACY IP CONSULT    Code Status   Full Code    Time Spent on this Encounter   ITamar MD,  personally saw the patient today and spent greater than 30 minutes discharging this patient.       Tamar Martinez MD  [facility] EMERGENCY DEPARTMENT  [address] [address]  Phone: [phone]  ______________________________________________________________________    Physical Exam   Vital Signs: Temp: 97.6  F (36.4  C) Temp src: Oral BP: (!) 146/75 Pulse: 60   Resp: 14 SpO2: 96 % O2 Device: None (Room air)    Weight: 0 lbs 0 oz    General appearance: Awake, Alert, Cooperative, not in any obvious distress and appears stated age   HEENT: Normocephalic, atraumatic, conjunctiva clear without icterus and ears without discharge  Lungs: Clear to auscultation bilaterally, no wheezing, good air exchange, normal work of breathing  Cardiovascular: Regular Rate and Rythm, normal apical impulse, normal S1 and S2, no lower extremity edema bilaterally  Abdomen: Soft, non-tender and Non-distended, active bowel sounds  Skin: Skin color, texture normal and bruising or bleeding. No rashes or lesions over face, neck, arms and legs, turgor normal.  Musculoskeletal: TLSO brace in place.   Neurologic: Alert & Oriented X 3, Facial symmetry preserved and upper & lower extremities moving well with symmetry  Psychiatric: Calm, normal eye contact and normal affect       Primary Care Physician   Esa Valdivia        Discharge Orders      Reason for your hospital stay    Acute on chronic back pain   L1 vertebral compression fracture status post kyphoplasty with subsequent refracture.   Stage III CKD     Follow-up and recommended labs and tests     Follow up with primary care provider, Esa Valdivia, within 7 days for hospital follow- up.  The following labs/tests are recommended: BMP.    Monitor BMP and adjust dosage of losartan as needed.  Outpatient follow-up at the nephrology clinic as arranged.  Follow-up at the orthopedic surgery clinic as arranged.     Activity    Your activity upon  discharge: activity as tolerated     Diet    Follow this diet upon discharge: Orders Placed This Encounter      Low Saturated Fat Na <2400 mg       Significant Results and Procedures   Results for orders placed or performed during the hospital encounter of 03/10/24   CT Abdomen Pelvis w/o Contrast    Narrative    EXAM: CT ABDOMEN AND PELVIS WITHOUT CONTRAST  LOCATION: [facility]  DATE: 03/10/2024    INDICATION: Low back pain wrapping around to flanks bilaterally, history of stones.  COMPARISON: CT abdomen and pelvis, 01/16/2023.  TECHNIQUE: CT scan of the abdomen and pelvis was performed without IV contrast. Multiplanar reformats were obtained. Dose reduction techniques were used.  CONTRAST: None.    FINDINGS:   LOWER CHEST: Normal.    HEPATOBILIARY: Normal.    PANCREAS: Normal.    SPLEEN: Normal.    ADRENAL GLANDS: Normal.    KIDNEYS/BLADDER: Normal.    BOWEL: There is sigmoid diverticulosis present. There is a small curvilinear bandlike isodensity off the left side of the mid sigmoid colon with mild adjacent fat stranding. This is new from January 2023 CT scan. Both ends of this curvilinear bandlike   structure abut the sigmoid colon.    LYMPH NODES: Normal.    VASCULATURE: Unremarkable.    PELVIC ORGANS: Prostatectomy.    MUSCULOSKELETAL: The bones are demineralized. There are no suspicious osseous lesions.      Impression    IMPRESSION:   1.  No urinary tract stones or hydronephrosis.  2.  Sigmoid diverticulosis. There is a bandlike curvilinear structure abutting the left side of the distal sigmoid colon with a small amount of adjacent fat stranding. These findings could reflect sequela of recent or remote acute diverticulitis. Given   its curvilinear appearance, a colocolonic fistula is a consideration.       CT Lumbar Spine Reconstructed    Narrative    EXAM: CT LUMBAR SPINE RECONSTRUCTED  LOCATION: [facility]  DATE: 03/10/2024    INDICATION: Low back  pain.  COMPARISON: CT abdomen and pelvis 01/16/2023.  TECHNIQUE: Routine CT Lumbar Spine without IV contrast. Multiplanar reformats. Dose reduction techniques were used.     FINDINGS:  VERTEBRA: Five lumbar-type vertebrae counting down from the presumed twelfth ribs. New mild L1 superior endplate compression fracture with approximately 10-20% loss of vertebral body height. No visible fracture line or associated paravertebral soft   tissue edema. No retropulsion of fracture fragments or fracture extension through the posterior elements. No associated destructive bone lesion or soft tissue mass. Normal alignment of the lumbar vertebrae. Normal lumbar lordosis. Multilevel lower lumbar   predominant spondylosis with loss of disc height, disc bulge formation and bilateral facet arthrosis. Degenerative opposing endplate sclerosis and vacuum disc phenomenon at L5-S1.    CANAL/FORAMINA: Moderate left and mild to moderate right neural foraminal stenosis at L5-S1. Mild to moderate bilateral neural foraminal stenosis at L4-L5. Mild spinal canal stenosis L4-S1.    PARASPINAL: Mild to moderate symmetric lower lumbar predominant dorsal paraspinous muscular atrophy. Colonic diverticulosis.    Mild to moderate degenerative changes of the sacroiliac joints.      Impression    IMPRESSION:  1.  Mild L1 superior endplate compression fracture, new since 01/16/2023  2.  Multilevel lower lumbar predominant spondylosis, most advanced at L5-S1.     XR Lumbar Spine 2/3 Views    Narrative    EXAM: XR LUMBAR SPINE 2/3 VIEWS  LOCATION: [facility]  DATE: 3/10/2024    INDICATION: Known L1 fracture.  COMPARISON: CT lumbar spine 03/10/2024      Impression    IMPRESSION: Minimally depressed L1 fracture is unchanged from prior. Normal alignment. Moderate multilevel disc height loss and facet hypertrophy, greatest at L5-S1. Aortic atherosclerosis.       Discharge Medications   Current Discharge Medication List        START  taking these medications    Details   oxyCODONE (ROXICODONE) 5 MG tablet Take 1 tablet (5 mg) by mouth every 6 hours as needed for pain  Qty: 12 tablet, Refills: 0    Associated Diagnoses: Acute midline low back pain without sciatica           CONTINUE these medications which have NOT CHANGED    Details   amLODIPine (NORVASC) 10 MG tablet Take 1 tablet (10 mg) by mouth daily  Qty: 30 tablet, Refills: 0    Associated Diagnoses: Primary hypertension      Calcium Carbonate-Vitamin D (OYSTER SHELL CALCIUM/D) 500-5 MG-MCG TABS Take 1 tablet by mouth 2 times daily (with meals)      fish oil-omega-3 fatty acids 1000 MG capsule Take 2 capsules by mouth 2 times daily      fluocinonide (LIDEX) 0.05 % external solution Apply topically daily as needed (psoriasis)      fluticasone (FLONASE) 50 mcg/actuation nasal spray [FLUTICASONE (FLONASE) 50 MCG/ACTUATION NASAL SPRAY] 2 sprays into each nostril daily as needed for rhinitis.      levothyroxine (SYNTHROID/LEVOTHROID) 112 MCG tablet Take 112 mcg by mouth daily      mycophenolate (GENERIC EQUIVALENT) 500 MG tablet Take 1.5 tablets (750 mg) by mouth 2 times daily  Qty: 90 tablet, Refills: 11    Associated Diagnoses: Myasthenia gravis (H)      !! predniSONE (DELTASONE) 10 MG tablet Take one tablet daily. Take only 10mg on even days. Take with 40mg equaling (50mg) on odd days.  Qty: 90 tablet, Refills: 3    Associated Diagnoses: Myasthenia gravis (H)      !! predniSONE (DELTASONE) 20 MG tablet Take 2 tablets every other day (Take with 10 mg tab to equal (50mg) on odd days).  Qty: 90 tablet, Refills: 11    Associated Diagnoses: Myasthenia gravis (H)      pyRIDostigmine (MESTINON) 60 MG tablet One po qam and one 1 pm  Qty: 60 tablet, Refills: 11    Associated Diagnoses: Myasthenia gravis (H)      traMADol (ULTRAM) 50 MG tablet Take 1 tablet by mouth 2 times daily       !! - Potential duplicate medications found. Please discuss with provider.        STOP taking these medications        losartan (COZAAR) 100 MG tablet Comments:   Reason for Stopping:             Allergies   Allergies   Allergen Reactions    Doxycycline Muscle Pain (Myalgia) and Other (See Comments)     Brought on Myasthenia Gravis

## 2024-05-01 NOTE — UTILIZATION REVIEW
"Admission Status; Secondary Review Determination       Under the authority of the Utilization Management Committee, the utilization review process indicated a secondary review on the above patient.  The review outcome is based on review of the medical records, discussions with staff, and applying clinical experience noted on the date of the review.        (x) Observation Status Appropriate - This patient does not meet hospital inpatient criteria and is placed in observation status. If this patient's primary payer is Medicare and was admitted as an inpatient, Condition Code 44 should be used and patient status changed to \"observation\".     RATIONALE FOR DETERMINATION     Mr. De Leon is a 69 yo male with PMH of chronic back pain d/t compression fractures who underwent recent kyphoplasty.  Presented to the ED with increased difficulty with pain despite wearing his back brace and evidence of re-fracture on imaging.  No concerning symptoms of difficulty with urination/defecation.  Has received one IV dose of pain meds since admission.    Ortho consulted; recommending non-surgical tx with increased pain control.  Plan for discharge home once pain is controlled with close outpt neurosurgery f/up.    Vitals are stable, on a regular diet.         The severity of illness, intensity of service provided, expected LOS and risk for adverse outcome make the care appropriate for further observation; however, doesn't meet criteria for hospital inpatient admission. Dr Martinez notified of this determination, awaiting call back.     The information on this document is developed by the utilization review team in order for the business office to ensure compliance.  This only denotes the appropriateness of proper admission status and does not reflect the quality of care rendered.         The definitions of Inpatient Status and Observation Status used in making the determination above are those provided in the CMS Coverage Manual, Chapter " 1 and Chapter 6, section 70.4.      Sincerely,    Shelley Reed, DO  Utilization Review Physician Advisor

## 2024-05-01 NOTE — ED PROVIDER NOTES
EMERGENCY DEPARTMENT ENCOUnter      NAME: Maurice Biswas  AGE: 68 year old male  YOB: 1955  MRN: [id number]  EVALUATION DATE & TIME: 4/30/2024 10:28 PM    PCP: Esa Valdivia    ED PROVIDER: Dwain Castaneda DO      Chief Complaint   Patient presents with    Back Pain         FINAL IMPRESSION:  1. Acute midline low back pain without sciatica          ED COURSE & MEDICAL DECISION MAKING:      10:36 PM I met with the patient in [facility] and performed my initial interview and exam   12:15 AM I spoke with Dr. Lara with [facility] who agrees with admission for pain control  12:21 AM I updated the patient with the plan for admission from [facility]. The patient is agreeable   12:55 AM I discussed the patient with Dr. Jc from the hospitalist service who agrees to admit the patient.        The patient presented to the emergency room today complaining of back pain.  He recently had a kyphoplasty and then subsequently had an MRI showing new compression deformities.  He is currently wearing a back brace.  He has no neurologic deficits on exam.  His case was discussed with orthopedics.  They agree with the plan for hospitalization for pain control and further workup.        Medical Decision Making  Obtained supplemental history:Supplemental history obtained?: Documented in chart and Family Member/Significant Other  Reviewed external records: External records reviewed?: Documented in chart and Prior Imaging: MRI 4/18/24    Care impacted by chronic illness:N/A  Care significantly affected by social determinants of health:N/A  Did you consider but not order tests?: Work up considered but not performed and documented in chart, if applicable  Did you interpret images independently?: Independent interpretation of ECG and images noted in documentation, when applicable.  Consultation discussion with other provider:Did you involve another provider (consultant, MH, pharmacy, etc.)?: I discussed  the care with another health care provider, see documentation for details.  Admit.    At the conclusion of the encounter I discussed the results of all of the tests and the disposition. The questions were answered. The patient or family acknowledged understanding and was agreeable with the care plan.         MEDICATIONS GIVEN IN THE EMERGENCY:  Medications   glucose gel 15-30 g (has no administration in time range)     Or   dextrose 50 % injection 25-50 mL (has no administration in time range)     Or   glucagon injection 1 mg (has no administration in time range)   lidocaine 1 % 0.1-1 mL (has no administration in time range)   lidocaine (LMX4) cream (has no administration in time range)   sodium chloride (PF) 0.9% PF flush 3 mL (3 mLs Intracatheter $Given 5/1/24 0142)   sodium chloride (PF) 0.9% PF flush 3 mL (has no administration in time range)   senna-docusate (SENOKOT-S/PERICOLACE) 8.6-50 MG per tablet 1 tablet (has no administration in time range)     Or   senna-docusate (SENOKOT-S/PERICOLACE) 8.6-50 MG per tablet 2 tablet (has no administration in time range)   calcium carbonate (TUMS) chewable tablet 1,000 mg (has no administration in time range)   melatonin tablet 1 mg (has no administration in time range)   ondansetron (ZOFRAN ODT) ODT tab 4 mg (has no administration in time range)     Or   ondansetron (ZOFRAN) injection 4 mg (has no administration in time range)   famotidine (PEPCID) injection 20 mg (20 mg Intravenous $Given 5/1/24 0138)   insulin aspart (NovoLOG) injection (RAPID ACTING) ( Subcutaneous Not Given 5/1/24 0145)   HYDROmorphone (PF) (DILAUDID) injection 0.3 mg (has no administration in time range)   tiZANidine (ZANAFLEX) tablet 2 mg (has no administration in time range)       NEW PRESCRIPTIONS STARTED AT TODAY'S ER VISIT  New Prescriptions    No medications on file          =================================================================    HPI        Maurice Biswas is a 68 year old male  "with a pertinent history of L1 compression fracture s/p kyphoplasty, myasthenia gravis, CKD 3, hypertension who presents to this ED via walk-in for evaluation of back pain    Per chart Review: The patient was seen in this ED on 3/10/2024 for back pain.  Patient had a CT lumbar spine that showed mild L1 superior endplate compression fracture.  Patient treated with Tylenol, oxycodone, Ativan, Zofran, Dilaudid, and lidocaine patch.  Patient discharged with Zofran and oxycodone prescription and advised to follow-up as an outpatient.    Patient reports he underwent a kyphoplasty on 3/25 with [facility] for his L1 compression fracture.  The patient reports he was healing well until mid April when his back pain \"progressively got worse\".  The patient had an MRI on 4/18 that showed a repeat fracture to his L1 vertebrae.  The patient was advised to wear a back brace, take tramadol and rest.  The patient has been taking tramadol and wearing his brace daily, but the pain has been \"getting worse\".  Patient notes that his pain is okay without movement, but as soon as he tries to get up and move the pain worsens and becomes \"stabbing\".  Patient also notes diarrhea, but thinks this is due to the medication he is taking.  Patient notes that the pain has typically been in the right low back, but for the past 5 days the pain has been located in his left low back.  Patient's wife thinks that the patient's stomach also looks more bloated than usual.  The patient denies any focal numbness or weakness and denies any loss of bowel or bladder.    PAST MEDICAL HISTORY:  History reviewed. No pertinent past medical history.    PAST SURGICAL HISTORY:  Past Surgical History:   Procedure Laterality Date    IR CVC NON TUNNEL PLACEMENT > 5 YRS  8/31/2023    IR RENAL BIOPSY LEFT  8/20/2018    PICC SINGLE LUMEN PLACEMENT  1/24/2024           CURRENT MEDICATIONS:    amLODIPine (NORVASC) 10 MG tablet  Calcium Carbonate-Vitamin D (OYSTER SHELL " CALCIUM/D) 500-5 MG-MCG TABS  fish oil-omega-3 fatty acids 1000 MG capsule  fluocinonide (LIDEX) 0.05 % external solution  fluticasone (FLONASE) 50 mcg/actuation nasal spray  levothyroxine (SYNTHROID/LEVOTHROID) 112 MCG tablet  losartan (COZAAR) 100 MG tablet  mycophenolate (GENERIC EQUIVALENT) 500 MG tablet  predniSONE (DELTASONE) 10 MG tablet  predniSONE (DELTASONE) 20 MG tablet  pyRIDostigmine (MESTINON) 60 MG tablet  traMADol (ULTRAM) 50 MG tablet        ALLERGIES:  Allergies   Allergen Reactions    Doxycycline Muscle Pain (Myalgia) and Other (See Comments)     Brought on Myasthenia Gravis       FAMILY HISTORY:  History reviewed. No pertinent family history.    SOCIAL HISTORY:   Social History     Socioeconomic History    Marital status:      Spouse name: None    Number of children: None    Years of education: None    Highest education level: None   Tobacco Use    Smoking status: Never    Smokeless tobacco: Never   Substance and Sexual Activity    Alcohol use: No    Drug use: No    Sexual activity: Yes     Partners: Female     Social Determinants of Health      Received from [employer] [employer] [facility]    Financial Resource Strain    Received from [employer] [employer] [facility] [employer] [facility] [employer]    Social Connections       VITALS:  Patient Vitals for the past 24 hrs:   BP Temp Temp src Pulse Resp SpO2   05/01/24 0145 (!) 150/80 -- -- 67 -- 97 %   04/30/24 2348 -- -- -- -- 18 --   04/30/24 2345 136/68 -- -- 64 -- 96 %   04/30/24 2147 -- 98.8  F (37.1  C) Oral -- -- --       PHYSICAL EXAM    Constitutional:  Well developed, Well nourished,  HENT:  Normocephalic, Atraumatic, Oropharynx moist, Nose normal.   Eyes:  EOMI, Conjunctiva normal, No discharge.   Respiratory:  Normal breath sounds, No respiratory distress, No wheezing, No chest tenderness.   Cardiovascular:  Normal heart rate, Normal rhythm, No  murmurs  GI:  Soft, No tenderness, No guarding, No CVA tenderness.   Musculoskeletal:  No tenderness to palpation or major deformities noted.  Currently wearing a back brace.  Pain with any movement of the low back.  Extremities: No lower extremity edema.  Neurologic:  Alert & oriented x 3, No focal deficits noted.   Psychiatric:  Affect normal, Judgment normal, Mood normal.        LAB:  All pertinent labs reviewed and interpreted.  Results for orders placed or performed during the hospital encounter of 04/30/24   CBC with platelets   Result Value Ref Range    WBC Count 12.4 (H) 4.0 - 11.0 10e3/uL    RBC Count 4.34 (L) 4.40 - 5.90 10e6/uL    Hemoglobin 12.9 (L) 13.3 - 17.7 g/dL    Hematocrit 39.6 (L) 40.0 - 53.0 %    MCV 91 78 - 100 fL    MCH 29.7 26.5 - 33.0 pg    MCHC 32.6 31.5 - 36.5 g/dL    RDW 13.2 10.0 - 15.0 %    Platelet Count 177 150 - 450 10e3/uL   Basic metabolic panel   Result Value Ref Range    Sodium 140 135 - 145 mmol/L    Potassium 3.6 3.4 - 5.3 mmol/L    Chloride 102 98 - 107 mmol/L    Carbon Dioxide (CO2) 25 22 - 29 mmol/L    Anion Gap 13 7 - 15 mmol/L    Urea Nitrogen 39.1 (H) 8.0 - 23.0 mg/dL    Creatinine 1.87 (H) 0.67 - 1.17 mg/dL    GFR Estimate 38 (L) >60 mL/min/1.73m2    Calcium 10.3 (H) 8.8 - 10.2 mg/dL    Glucose 91 70 - 99 mg/dL   Result Value Ref Range    Hemoglobin A1C 5.0 <5.7 %   Glucose by meter   Result Value Ref Range    GLUCOSE BY METER POCT 100 (H) 70 - 99 mg/dL         I, Radha Sagastume, am serving as a scribe to document services personally performed by Dr. Castaneda based on my observation and the provider's statements to me. I, Dwain Castaneda, DO attest that Radha Sagastume is acting in a scribe capacity, has observed my performance of the services and has documented them in accordance with my direction.    Dwain Castaneda DO  Emergency Medicine  [facility] EMERGENCY DEPARTMENT  [address]  [place]  [address]  [phone]  Dept: [phone]     Dwain Castaneda,   05/01/24 0158

## 2024-05-01 NOTE — H&P
[facility]    History and Physical - Hospitalist Service       Date of Admission:  4/30/2024    Assessment & Plan    Patient is a 68-year-old male with a medical history significant for hypertension, psoriatic arthritis, chronic pain syndrome, recent history of L1 fracture status post kyphoplasty, myasthenia gravis and other medical comorbidities who is admitted with increasing back pain from worsening compression fractures.       Patient Active Problem List   Diagnosis    Septic bursitis    NILA (acute kidney injury) (H24)    Anemia, unspecified type    Acquired hypothyroidism    Sinus bradycardia    Abnormal LFTs    Myasthenia gravis (H)    Stage 3a chronic kidney disease (H)    Primary hypertension    Steroid-induced hyperglycemia    Pain of finger of left hand    Suppurative tenosynovitis of flexor tendon    Pyogenic arthritis of right hand (H)    Acute midline low back pain without sciatica    Back pain      Acute on chronic back pain-patient has been on chronic steroids due to his myasthenia gravis with probable osteoporosis.  General surgery consulted.  Pain control as needed.  Brace in place when seen    Psoriatic arthritis-outpatient follow-up    History of myasthenia gravis-patient is on CellCept, chronic steroids and Mestinon.  Consulted pharmacy to order steroids as dosing is complicated.    CKD stage III-avoid nephrotoxic drugs    Hypothyroidism-resume Synthroid    Hypertension-resume amlodipine    Rest of patient's medical problems management remains unchanged    Diet:  Regular diet    DVT Prophylaxis: Pneumatic Compression Devices  [place] Catheter: Not present  Lines: None     Cardiac Monitoring: None  Code Status:  Full code    Clinically Significant Risk Factors Present on Admission                  # Hypertension: Noted on problem list                 Disposition Plan     Medically Ready for Discharge: Anticipated in 2-4 Days           Tmaika Jc MD  Hospitalist  "[facility]  Securely message with [employer] (more info)  Text page via [employer]: turn off the lights Paging/Directory     ______________________________________________________________________    Chief Complaint   Acute on chronic back pain        History of Present Illness   Patient is a 68-year-old male with a medical history significant for hypertension, psoriatic arthritis, chronic pain syndrome, recent history of L1 fracture status post kyphoplasty, myasthenia gravis and other medical comorbidities who is admitted with increasing back pain from worsening compression fractures.    Per history, patient recently had an L1  kyphoplaty.  He subsequently started having increasing back pain on his back.  On 4/18 he had an MRI which showed new compression deformities.  Brace was recommended which he has been wearing unfortunately his pain keeps getting worse.  The patient has been taking tramadol and wearing his brace daily, but the pain has been \"getting worse\".  Patient notes that his pain is okay without movement, but as soon as he tries to get up and move the pain worsens and becomes \"stabbing\".  Patient also notes diarrhea, but thinks this is due to the medication he is taking.  Patient notes that the pain has typically been in the right low back, but for the past 5 days the pain has been located in his left low back.  Patient's wife thinks that the patient's stomach also looks more bloated than usual.  The patient denies any focal numbness or weakness and denies any loss of bowel or bladder.    He came to the ER for further evaluation.  Case was discussed with [facility] and the recommendation is to admit for further evaluation.      Patient graded his pain as 1 without movement when seen.  He was laying still when I saw him in his brace.    Past Medical History    Hypertension   Psoriatic   arthritis psoriasis    Past Surgical History   Past Surgical History:   Procedure Laterality Date    IR CVC " NON TUNNEL PLACEMENT > 5 YRS  8/31/2023    IR RENAL BIOPSY LEFT  8/20/2018    PICC SINGLE LUMEN PLACEMENT  1/24/2024       Prior to Admission Medications   Prior to Admission Medications   Prescriptions Last Dose Informant Patient Reported? Taking?   Calcium Carbonate-Vitamin D (OYSTER SHELL CALCIUM/D) 500-5 MG-MCG TABS   Yes No   Sig: Take 1 tablet by mouth 2 times daily (with meals)   amLODIPine (NORVASC) 10 MG tablet   No No   Sig: Take 1 tablet (10 mg) by mouth daily   fish oil-omega-3 fatty acids 1000 MG capsule   Yes No   Sig: Take 2 capsules by mouth 2 times daily   fluticasone (FLONASE) 50 mcg/actuation nasal spray   Yes No   Sig: [FLUTICASONE (FLONASE) 50 MCG/ACTUATION NASAL SPRAY] 2 sprays into each nostril daily as needed for rhinitis.   levothyroxine (SYNTHROID/LEVOTHROID) 112 MCG tablet   Yes No   Sig: Take 112 mcg by mouth daily   losartan (COZAAR) 100 MG tablet   No No   Sig: Take 1 tablet (100 mg) by mouth daily   mycophenolate (GENERIC EQUIVALENT) 500 MG tablet   No No   Sig: Take 1.5 tablets (750 mg) by mouth 2 times daily   oxyCODONE (ROXICODONE) 5 MG tablet   No No   Sig: Take every 4-6 hours as needed for severe pain.  Take 1 tablet to start.  If your pain is is still severe 2 hours later, you can take a second tablet.  If you take the second tablet, do not take any additional oxycodone for the following 4-6 hours.   predniSONE (DELTASONE) 10 MG tablet   No No   Sig: Take one tablet daily. Take only 10mg on even days. Take with 40mg equaling (50mg) on odd days.   predniSONE (DELTASONE) 20 MG tablet   No No   Sig: Take 2 tablets every other day (Take with 10 mg tab to equal (50mg) on odd days).   pyRIDostigmine (MESTINON) 60 MG tablet   No No   Sig: One po qam and one 1 pm      Facility-Administered Medications: None        Review of Systems    The 10 point Review of Systems is negative other than noted in the HPI or here.     Social History   I have reviewed this patient's social history and  updated it with pertinent information if needed.  Social History     Tobacco Use    Smoking status: Never    Smokeless tobacco: Never   Substance Use Topics    Alcohol use: No    Drug use: No         Family History     Family History  Medical History Relation Name Comments   Cancer, Colon Negative Family History       Cancer, Prostate Negative Family History           Allergies   Allergies   Allergen Reactions    Doxycycline Muscle Pain (Myalgia) and Other (See Comments)     Brought on Myasthenia Gravis        Physical Exam   Vital Signs: Temp: 98.8  F (37.1  C) Temp src: Oral BP: 136/68 Pulse: 64   Resp: 18 SpO2: 96 % O2 Device: None (Room air)    Weight: 0 lbs 0 oz    General Aox3, appropriate affect, NAD, on RA  HEENT  MMM, EOMI, PERRL  Chest Adeq E b/l, No wheezing  Heart RRR, No M/R/G  Abd- Soft, NT, BS+  - Deferred,   Extremity- Moving all extremities, No digital clubbing,   No edema  Neuro- Aox3, grossly nonfocal, gait not checked  Skin  Has no tattoo, No skin rash   Spine-brace in place.  Unable to do detailed exam, due to pain    Medical Decision Making       85 MINUTES SPENT BY ME on the date of service doing chart review, history, exam, documentation & further activities per the note.      ------------------ MEDICAL DECISION MAKING ------------------------------------------------------------------------------------------------------  MANAGEMENT DISCUSSED with the following over the past 24 hours: Patient's and caregivers       Data   ------------------------- PAST 24 HR DATA REVIEWED -----------------------------------------------    I have personally reviewed the following data over the past 24 hrs:    12.4 (H)  \   12.9 (L)   / 177     140 102 39.1 (H) /  91   3.6 25 1.87 (H) \     TSH: N/A T4: N/A A1C: 5.0       Imaging results reviewed over the past 24 hrs:   No results found for this or any previous visit (from the past 24 hour(s)).

## 2024-05-02 ENCOUNTER — PATIENT OUTREACH (OUTPATIENT)
Dept: CARE COORDINATION | Facility: CLINIC | Age: 69
End: 2024-05-02
Payer: COMMERCIAL

## 2024-05-02 NOTE — PROGRESS NOTES
"  [facility]: [facility]: Post-Discharge Note  SITUATION                                                      Admission:    Admission Date: 05/01/24   Reason for Admission: Acute on chronic back pain  Discharge:   Discharge Date: 05/01/24  Discharge Diagnosis: Acute on chronic back pain    BACKGROUND                                                      Per hospital discharge summary and inpatient provider notes:    Maurice Biswas is a 68 year old male with a pertinent history of L1 compression fracture s/p kyphoplasty, myasthenia gravis, CKD 3, hypertension who presents to this ED via walk-in for evaluation of back pain     Per chart Review: The patient was seen in this ED on 3/10/2024 for back pain.  Patient had a CT lumbar spine that showed mild L1 superior endplate compression fracture.  Patient treated with Tylenol, oxycodone, Ativan, Zofran, Dilaudid, and lidocaine patch.  Patient discharged with Zofran and oxycodone prescription and advised to follow-up as an outpatient.     Patient reports he underwent a kyphoplasty on 3/25 with [facility] for his L1 compression fracture.  The patient reports he was healing well until mid April when his back pain \"progressively got worse\".  The patient had an MRI on 4/18 that showed a repeat fracture to his L1 vertebrae.  The patient was advised to wear a back brace, take tramadol and rest.  The patient has been taking tramadol and wearing his brace daily, but the pain has been \"getting worse\".  Patient notes that his pain is okay without movement, but as soon as he tries to get up and move the pain worsens and becomes \"stabbing\".  Patient also notes diarrhea, but thinks this is due to the medication he is taking.  Patient notes that the pain has typically been in the right low back, but for the past 5 days the pain has been located in his left low back.  Patient's wife thinks that the patient's stomach also looks more bloated than " "usual.  The patient denies any focal numbness or weakness and denies any loss of bowel or bladder.    ASSESSMENT           Discharge Assessment  How are you doing now that you are home?: \"I'm doing great thank you\"  How are your symptoms? (Red Flag symptoms escalate to triage hotline per guidelines): Improved  Do you feel your condition is stable enough to be safe at home until your provider visit?: Yes  Does the patient have their discharge instructions? : Yes  Does the patient have questions regarding their discharge instructions? : No  Were you started on any new medications or were there changes to any of your previous medications? : Yes  Does the patient have all of their medications?: Yes  Do you have questions regarding any of your medications? : No  Do you have all of your needed medical supplies or equipment (DME)?  (i.e. oxygen tank, CPAP, cane, etc.): Yes  Discharge follow-up appointment scheduled within 14 calendar days? : Yes  Discharge Follow Up Appointment Date: 05/31/24  Discharge Follow Up Appointment Scheduled with?: Specialty Care Provider                  PLAN                                                      Outpatient Plan:  Follow up with primary care provider, Esa Valdivia, within 7 days for  hospital follow- up. The following labs/tests are recommended: BMP.  Monitor BMP and adjust dosage of losartan as needed.  Outpatient follow-up at the nephrology clinic as arranged.  Follow-up at the orthopedic surgery clinic as arranged.    Future Appointments   Date Time Provider Department Center   7/15/2024  3:00 PM [facility]         For any urgent concerns, please contact our 24 hour nurse triage line: [phone] ([phone])         Josette Umana  Community Health Worker  [facility]  [phone]                  "

## 2024-06-10 ENCOUNTER — APPOINTMENT (OUTPATIENT)
Dept: CT IMAGING | Facility: HOSPITAL | Age: 69
End: 2024-06-10
Attending: FAMILY MEDICINE
Payer: COMMERCIAL

## 2024-06-10 ENCOUNTER — HOSPITAL ENCOUNTER (EMERGENCY)
Facility: HOSPITAL | Age: 69
Discharge: HOME | End: 2024-06-10
Attending: FAMILY MEDICINE | Admitting: FAMILY MEDICINE
Payer: COMMERCIAL

## 2024-06-10 VITALS
SYSTOLIC BLOOD PRESSURE: 125 MMHG | HEART RATE: 72 BPM | HEIGHT: 69 IN | DIASTOLIC BLOOD PRESSURE: 69 MMHG | TEMPERATURE: 98.5 F | WEIGHT: 185 LBS | OXYGEN SATURATION: 98 % | BODY MASS INDEX: 27.4 KG/M2 | RESPIRATION RATE: 16 BRPM

## 2024-06-10 DIAGNOSIS — K62.5 RECTAL BLEEDING: ICD-10-CM

## 2024-06-10 LAB
ABO/RH(D): NORMAL
ALBUMIN SERPL BCG-MCNC: 4.2 G/DL (ref 3.5–5.2)
ALP SERPL-CCNC: 93 U/L (ref 40–150)
ALT SERPL W P-5'-P-CCNC: 27 U/L (ref 0–70)
ANION GAP SERPL CALCULATED.3IONS-SCNC: 13 MMOL/L (ref 7–15)
ANTIBODY SCREEN: NEGATIVE
AST SERPL W P-5'-P-CCNC: 13 U/L (ref 0–45)
BASOPHILS # BLD AUTO: 0 10E3/UL (ref 0–0.2)
BASOPHILS NFR BLD AUTO: 0 %
BILIRUB SERPL-MCNC: 0.7 MG/DL
BUN SERPL-MCNC: 25.7 MG/DL (ref 8–23)
CALCIUM SERPL-MCNC: 9.2 MG/DL (ref 8.8–10.2)
CHLORIDE SERPL-SCNC: 106 MMOL/L (ref 98–107)
CREAT SERPL-MCNC: 1.26 MG/DL (ref 0.67–1.17)
DEPRECATED HCO3 PLAS-SCNC: 22 MMOL/L (ref 22–29)
EGFRCR SERPLBLD CKD-EPI 2021: 61 ML/MIN/1.73M2
EOSINOPHIL # BLD AUTO: 0 10E3/UL (ref 0–0.7)
EOSINOPHIL NFR BLD AUTO: 0 %
ERYTHROCYTE [DISTWIDTH] IN BLOOD BY AUTOMATED COUNT: 13.5 % (ref 10–15)
GLUCOSE SERPL-MCNC: 118 MG/DL (ref 70–99)
HCT VFR BLD AUTO: 40.1 % (ref 40–53)
HGB BLD-MCNC: 12.8 G/DL (ref 13.3–17.7)
IMM GRANULOCYTES # BLD: 0.1 10E3/UL
IMM GRANULOCYTES NFR BLD: 1 %
INR PPP: 1.15 (ref 0.85–1.15)
LACTATE SERPL-SCNC: 1.5 MMOL/L (ref 0.7–2)
LACTATE SERPL-SCNC: 2.2 MMOL/L (ref 0.7–2)
LYMPHOCYTES # BLD AUTO: 1 10E3/UL (ref 0.8–5.3)
LYMPHOCYTES NFR BLD AUTO: 8 %
MCH RBC QN AUTO: 29 PG (ref 26.5–33)
MCHC RBC AUTO-ENTMCNC: 31.9 G/DL (ref 31.5–36.5)
MCV RBC AUTO: 91 FL (ref 78–100)
MONOCYTES # BLD AUTO: 0.8 10E3/UL (ref 0–1.3)
MONOCYTES NFR BLD AUTO: 6 %
NEUTROPHILS # BLD AUTO: 10.8 10E3/UL (ref 1.6–8.3)
NEUTROPHILS NFR BLD AUTO: 85 %
NRBC # BLD AUTO: 0 10E3/UL
NRBC BLD AUTO-RTO: 0 /100
PLATELET # BLD AUTO: 208 10E3/UL (ref 150–450)
POTASSIUM SERPL-SCNC: 4.3 MMOL/L (ref 3.4–5.3)
PROT SERPL-MCNC: 6.4 G/DL (ref 6.4–8.3)
RBC # BLD AUTO: 4.42 10E6/UL (ref 4.4–5.9)
SODIUM SERPL-SCNC: 141 MMOL/L (ref 135–145)
SPECIMEN EXPIRATION DATE: NORMAL
WBC # BLD AUTO: 12.8 10E3/UL (ref 4–11)

## 2024-06-10 PROCEDURE — 258N000003 HC RX IP 258 OP 636: Mod: JZ | Performed by: FAMILY MEDICINE

## 2024-06-10 PROCEDURE — 96360 HYDRATION IV INFUSION INIT: CPT | Mod: 59

## 2024-06-10 PROCEDURE — 250N000011 HC RX IP 250 OP 636: Performed by: STUDENT IN AN ORGANIZED HEALTH CARE EDUCATION/TRAINING PROGRAM

## 2024-06-10 PROCEDURE — 85025 COMPLETE CBC W/AUTO DIFF WBC: CPT | Performed by: FAMILY MEDICINE

## 2024-06-10 PROCEDURE — 99285 EMERGENCY DEPT VISIT HI MDM: CPT | Mod: 25

## 2024-06-10 PROCEDURE — 83605 ASSAY OF LACTIC ACID: CPT | Mod: 91 | Performed by: EMERGENCY MEDICINE

## 2024-06-10 PROCEDURE — 85610 PROTHROMBIN TIME: CPT | Performed by: EMERGENCY MEDICINE

## 2024-06-10 PROCEDURE — 36415 COLL VENOUS BLD VENIPUNCTURE: CPT | Performed by: FAMILY MEDICINE

## 2024-06-10 PROCEDURE — 80053 COMPREHEN METABOLIC PANEL: CPT | Performed by: FAMILY MEDICINE

## 2024-06-10 PROCEDURE — 96361 HYDRATE IV INFUSION ADD-ON: CPT

## 2024-06-10 PROCEDURE — 36415 COLL VENOUS BLD VENIPUNCTURE: CPT | Performed by: EMERGENCY MEDICINE

## 2024-06-10 PROCEDURE — 83605 ASSAY OF LACTIC ACID: CPT | Performed by: EMERGENCY MEDICINE

## 2024-06-10 PROCEDURE — 86900 BLOOD TYPING SEROLOGIC ABO: CPT | Performed by: FAMILY MEDICINE

## 2024-06-10 PROCEDURE — 74174 CTA ABD&PLVS W/CONTRAST: CPT

## 2024-06-10 RX ORDER — IOPAMIDOL 755 MG/ML
90 INJECTION, SOLUTION INTRAVASCULAR ONCE
Status: COMPLETED | OUTPATIENT
Start: 2024-06-10 | End: 2024-06-10

## 2024-06-10 RX ADMIN — SODIUM CHLORIDE 1000 ML: 9 INJECTION, SOLUTION INTRAVENOUS at 19:30

## 2024-06-10 RX ADMIN — IOPAMIDOL 90 ML: 755 INJECTION, SOLUTION INTRAVENOUS at 20:59

## 2024-06-10 ASSESSMENT — ACTIVITIES OF DAILY LIVING (ADL)
ADLS_ACUITY_SCORE: 35
ADLS_ACUITY_SCORE: 35
ADLS_ACUITY_SCORE: 33
ADLS_ACUITY_SCORE: 35

## 2024-06-10 NOTE — ED PROVIDER NOTES
EMERGENCY DEPARTMENT ENCOUNTER      NAME: Maurice Biswas  AGE: 69 year old male  YOB: 1955  MRN: [id number]  EVALUATION DATE & TIME: 6/10/2024  6:44 PM    PCP: Esa Valdivia    ED PROVIDER: Walt Murcia M.D.    Chief Complaint   Patient presents with    Rectal Bleeding       FINAL IMPRESSION:  1. Rectal bleeding        ED COURSE & MEDICAL DECISION MAKING:    Pertinent Labs & Imaging studies independently interpreted by me. (See chart for details)  Reviewed most recent nephrology visit from May 7 which was follow-up for CKD 3 IgA nephropathy, losartan was restarted at it but stopped due to hypotension.    ED Course as of 06/10/24 1946   Mon Pineda 10, 2024   1857 Patient seen and examined, presents with blood in the stools.  Patient has chronic diarrhea secondary to medications, has blood in the stools for the last couple of weeks but now occurs every time he poops.  Bright red blood, denies rectal pain, abdominal pain, nausea, vomiting.  Vitally stable on exam, labs are ordered along with CT of the abdomen and pelvis and will perform rectal exam as suspect this could be from a bleeding hemorrhoid.   1915 Lactate elevated at 2.2, IV fluids are ordered and plan for repeat.   1931 Labs ordered and independently turbid by me with mild leukocytosis at 12.8, this could be acute stress from his recent surgery, hemoglobin 12.8 which is stable for patient, creatinine 1.26 which is stable for patient, hepatic panel is normal.  INR is normal.   1931 Signout to oncoming provider to follow-up on CTA   1939 Rectal exam performed with external hemorrhoids but no fresh clot or active bleeding.     At the conclusion of the encounter I discussed the results of all of the tests and the disposition. The questions were answered. The patient or family acknowledged understanding and was agreeable with the care plan.     Medical Decision Making  Obtained supplemental history:Supplemental history obtained?: Family  Member/Significant Other  Reviewed external records: External records reviewed?: Documented in chart  Care impacted by chronic illness:Chronic Kidney Disease, Hypertension, and Other: Osteoporosis from chronic prednisone use  Care significantly affected by social determinants of health:Access to Affordable Health Care  Did you consider but not order tests?: Work up considered but not performed and documented in chart, if applicable  Did you interpret images independently?: Independent interpretation of ECG and images noted in documentation, when applicable.  Consultation discussion with other provider:Did you involve another provider (consultant, , pharmacy, etc.)?: I discussed the care with another health care provider, see documentation for details.  Admission considered. Patient was signed out to the oncoming physician, disposition pending.    MEDICATIONS GIVEN IN THE EMERGENCY:  Medications   sodium chloride 0.9% BOLUS 1,000 mL (1,000 mLs Intravenous $New Bag 6/10/24 1930)       NEW PRESCRIPTIONS STARTED AT TODAY'S ER VISIT  New Prescriptions    No medications on file       =================================================================    HPI    Patient information was obtained from: patient, spouse      Maurice Biswas is a 69 year old male with a pertinent history of myasthenia gravis, chronic prednisone use, chronic kidney disease who presents to this ED for evaluation of rectal bleeding.  Patient reports that his usual stooling is 4 or more times a day, has noted bright red bleeding with this over the last couple weeks.  Initially was only once in a while when he stooled but now it is consistent.  Denies abdominal pain fever, lightheadedness, chest pain or shortness of breath.  Recent L3-4 lumbar surgery.      REVIEW OF SYSTEMS   Review of Systems   All other systems reviewed and negative    PAST MEDICAL HISTORY:  No past medical history on file.    PAST SURGICAL HISTORY:  Past Surgical History:    Procedure Laterality Date    IR CVC NON TUNNEL PLACEMENT > 5 YRS  8/31/2023    IR RENAL BIOPSY LEFT  8/20/2018    PICC SINGLE LUMEN PLACEMENT  1/24/2024       CURRENT MEDICATIONS:    Current Facility-Administered Medications   Medication Dose Route Frequency Provider Last Rate Last Admin    sodium chloride 0.9% BOLUS 1,000 mL  1,000 mL Intravenous Once Walt Murcia MD 1,000 mL/hr at 06/10/24 1930 1,000 mL at 06/10/24 1930     Current Outpatient Medications   Medication Sig Dispense Refill    amLODIPine (NORVASC) 10 MG tablet Take 1 tablet (10 mg) by mouth daily 30 tablet 0    Calcium Carbonate-Vitamin D (OYSTER SHELL CALCIUM/D) 500-5 MG-MCG TABS Take 1 tablet by mouth 2 times daily (with meals)      fish oil-omega-3 fatty acids 1000 MG capsule Take 2 capsules by mouth 2 times daily      fluocinonide (LIDEX) 0.05 % external solution Apply topically daily as needed (psoriasis)      fluticasone (FLONASE) 50 mcg/actuation nasal spray [FLUTICASONE (FLONASE) 50 MCG/ACTUATION NASAL SPRAY] 2 sprays into each nostril daily as needed for rhinitis.      levothyroxine (SYNTHROID/LEVOTHROID) 112 MCG tablet Take 112 mcg by mouth daily      mycophenolate (GENERIC EQUIVALENT) 500 MG tablet Take 1.5 tablets (750 mg) by mouth 2 times daily 90 tablet 11    predniSONE (DELTASONE) 10 MG tablet Take one tablet daily. Take only 10mg on even days. Take with 40mg equaling (50mg) on odd days. 90 tablet 3    predniSONE (DELTASONE) 20 MG tablet Take 2 tablets every other day (Take with 10 mg tab to equal (50mg) on odd days). 90 tablet 11    pyRIDostigmine (MESTINON) 60 MG tablet One po qam and one 1 pm 60 tablet 11    traMADol (ULTRAM) 50 MG tablet Take 1 tablet by mouth 2 times daily         ALLERGIES:  Allergies   Allergen Reactions    Doxycycline Muscle Pain (Myalgia) and Other (See Comments)     Brought on Myasthenia Gravis       FAMILY HISTORY:  No family history on file.    SOCIAL HISTORY:   Social History     Socioeconomic  "History    Marital status:    Tobacco Use    Smoking status: Never    Smokeless tobacco: Never   Substance and Sexual Activity    Alcohol use: No    Drug use: No    Sexual activity: Yes     Partners: Female     Social Determinants of Health      [facility]    Financial Resource Strain    [facility]    Social Connections       VITALS:  BP (!) 157/74   Pulse 70   Temp 98.5  F (36.9  C) (Temporal)   Resp 16   Ht 1.753 m (5' 9\")   Wt 83.9 kg (185 lb)   SpO2 98%   BMI 27.32 kg/m      PHYSICAL EXAM:  Physical Exam  Vitals and nursing note reviewed.   Constitutional:       Appearance: Normal appearance.   HENT:      Head: Normocephalic and atraumatic.      Right Ear: External ear normal.      Left Ear: External ear normal.      Nose: Nose normal.      Mouth/Throat:      Mouth: Mucous membranes are moist.   Eyes:      Extraocular Movements: Extraocular movements intact.      Conjunctiva/sclera: Conjunctivae normal.      Pupils: Pupils are equal, round, and reactive to light.   Cardiovascular:      Rate and Rhythm: Normal rate and regular rhythm.   Pulmonary:      Effort: Pulmonary effort is normal.      Breath sounds: Normal breath sounds. No wheezing or rales.   Abdominal:      General: Abdomen is flat. There is no distension.      Palpations: Abdomen is soft.      Tenderness: There is no abdominal tenderness. There is no guarding.   Musculoskeletal:         General: Normal range of motion.      Cervical back: Normal range of motion and neck supple.      Right lower leg: No edema.      Left lower leg: No edema.      Comments: Back brace in place   Lymphadenopathy:      Cervical: No cervical adenopathy.   Skin:     General: Skin is warm and dry.   Neurological:      General: No focal deficit present.      Mental Status: He is " alert and oriented to person, place, and time. Mental status is at baseline.      Comments: No gross focal neurologic deficits   Psychiatric:         Mood and Affect: Mood normal.         Behavior: Behavior normal.         Thought Content: Thought content normal.     LAB:  All pertinent labs reviewed and interpreted.  Results for orders placed or performed during the hospital encounter of 06/10/24   Result Value Ref Range    INR 1.15 0.85 - 1.15   Lactic acid whole blood with 1x repeat in 2 hr when >2   Result Value Ref Range    Lactic Acid, Initial 2.2 (H) 0.7 - 2.0 mmol/L   Comprehensive metabolic panel   Result Value Ref Range    Sodium 141 135 - 145 mmol/L    Potassium 4.3 3.4 - 5.3 mmol/L    Carbon Dioxide (CO2) 22 22 - 29 mmol/L    Anion Gap 13 7 - 15 mmol/L    Urea Nitrogen 25.7 (H) 8.0 - 23.0 mg/dL    Creatinine 1.26 (H) 0.67 - 1.17 mg/dL    GFR Estimate 61 >60 mL/min/1.73m2    Calcium 9.2 8.8 - 10.2 mg/dL    Chloride 106 98 - 107 mmol/L    Glucose 118 (H) 70 - 99 mg/dL    Alkaline Phosphatase 93 40 - 150 U/L    AST 13 0 - 45 U/L    ALT 27 0 - 70 U/L    Protein Total 6.4 6.4 - 8.3 g/dL    Albumin 4.2 3.5 - 5.2 g/dL    Bilirubin Total 0.7 <=1.2 mg/dL   CBC with platelets and differential   Result Value Ref Range    WBC Count 12.8 (H) 4.0 - 11.0 10e3/uL    RBC Count 4.42 4.40 - 5.90 10e6/uL    Hemoglobin 12.8 (L) 13.3 - 17.7 g/dL    Hematocrit 40.1 40.0 - 53.0 %    MCV 91 78 - 100 fL    MCH 29.0 26.5 - 33.0 pg    MCHC 31.9 31.5 - 36.5 g/dL    RDW 13.5 10.0 - 15.0 %    Platelet Count 208 150 - 450 10e3/uL    % Neutrophils 85 %    % Lymphocytes 8 %    % Monocytes 6 %    % Eosinophils 0 %    % Basophils 0 %    % Immature Granulocytes 1 %    NRBCs per 100 WBC 0 <1 /100    Absolute Neutrophils 10.8 (H) 1.6 - 8.3 10e3/uL    Absolute Lymphocytes 1.0 0.8 - 5.3 10e3/uL    Absolute Monocytes 0.8 0.0 - 1.3 10e3/uL    Absolute Eosinophils 0.0 0.0 - 0.7 10e3/uL    Absolute Basophils 0.0 0.0 - 0.2 10e3/uL    Absolute  Immature Granulocytes 0.1 <=0.4 10e3/uL    Absolute NRBCs 0.0 10e3/uL   Lactic acid whole blood   Result Value Ref Range    Lactic Acid 1.5 0.7 - 2.0 mmol/L   Adult Type and Screen   Result Value Ref Range    ABO/RH(D) A POS     SPECIMEN EXPIRATION DATE [id number]      RADIOLOGY:  Reviewed all pertinent imaging. Please see official radiology report.  CTA Abdomen Pelvis with Contrast    (Results Pending)     Walt Murcia M.D.  Emergency Medicine  [facility] EMERGENCY DEPARTMENT  [address] [place] [id number] [phone]  Dept: [phone]       Walt Murcia MD  06/10/24 1946

## 2024-06-10 NOTE — ED TRIAGE NOTES
Pt had L2L4 surgery on 6/4/24 with brace on. States he has had bright red stools for several weeks but slowly increasing with 4 stools today. Also c/o low abdominal pain and side pain. Hx of hemorrhoids. Not light headed or dizziness     Triage Assessment (Adult)       Row Name 06/10/24 1839          Triage Assessment    Airway WDL WDL        Respiratory WDL    Respiratory WDL WDL        Skin Circulation/Temperature WDL    Skin Circulation/Temperature WDL WDL        Cardiac WDL    Cardiac WDL WDL        Peripheral/Neurovascular WDL    Peripheral Neurovascular WDL WDL        Cognitive/Neuro/Behavioral WDL    Cognitive/Neuro/Behavioral WDL WDL

## 2024-06-11 NOTE — ED NOTES
Pt is a/o x4, wife at bedside, c/o abd, back pain 3/10: pain tolerable at this time, VSS, rectal exam performed by Dr. Murcia.

## 2024-06-11 NOTE — ED NOTES
EMERGENCY DEPARTMENT SIGN OUT NOTE        ED COURSE AND MEDICAL DECISION MAKING  Patient was signed out to me by Dr Walt Murcia at 7:43 PM.    In brief, Maurice Biswas is a 69 year old male who initially presented rectal bleeding     At time of sign out, disposition was pending CT results.    CT scan does not show any signs of active GI bleed.  There is a small localized anterior pancreatic tail fluid collection but clinically patient's symptoms are not all that consistent with pancreatitis.    Discussed labs and imaging findings with patient and he was reassured.  He and feels comfortable discharge home plans to follow-up with his GI doctor.  Discussed signs and symptoms of worsening condition and return precautions given.  Patient discharged stable condition.    FINAL IMPRESSION    1. Rectal bleeding        ED MEDS  Medications   sodium chloride 0.9% BOLUS 1,000 mL (1,000 mLs Intravenous $New Bag 6/10/24 1930)   iopamidol (ISOVUE-370) solution 90 mL (90 mLs Intravenous $Given 6/10/24 2059)       LAB  Labs Ordered and Resulted from Time of ED Arrival to Time of ED Departure   LACTIC ACID WHOLE BLOOD WITH 1X REPEAT IN 2 HR WHEN >2 - Abnormal       Result Value    Lactic Acid, Initial 2.2 (*)    COMPREHENSIVE METABOLIC PANEL - Abnormal    Sodium 141      Potassium 4.3      Carbon Dioxide (CO2) 22      Anion Gap 13      Urea Nitrogen 25.7 (*)     Creatinine 1.26 (*)     GFR Estimate 61      Calcium 9.2      Chloride 106      Glucose 118 (*)     Alkaline Phosphatase 93      AST 13      ALT 27      Protein Total 6.4      Albumin 4.2      Bilirubin Total 0.7     CBC WITH PLATELETS AND DIFFERENTIAL - Abnormal    WBC Count 12.8 (*)     RBC Count 4.42      Hemoglobin 12.8 (*)     Hematocrit 40.1      MCV 91      MCH 29.0      MCHC 31.9      RDW 13.5      Platelet Count 208      % Neutrophils 85      % Lymphocytes 8      % Monocytes 6      % Eosinophils 0      % Basophils 0      % Immature Granulocytes 1      NRBCs per  100 WBC 0      Absolute Neutrophils 10.8 (*)     Absolute Lymphocytes 1.0      Absolute Monocytes 0.8      Absolute Eosinophils 0.0      Absolute Basophils 0.0      Absolute Immature Granulocytes 0.1      Absolute NRBCs 0.0     INR - Normal    INR 1.15     LACTIC ACID WHOLE BLOOD - Normal    Lactic Acid 1.5     TYPE AND SCREEN, ADULT    ABO/RH(D) A POS      Antibody Screen Negative      SPECIMEN EXPIRATION DATE [id number]     ABO/RH TYPE AND SCREEN       EKG  N/A    RADIOLOGY    CTA Abdomen Pelvis with Contrast   Final Result   IMPRESSION:   1.  No evidence for active gastrointestinal bleeding.   2.  Small amount of fluid localizes anterior to the pancreatic tail, correlate clinically for possible pancreatitis.    3.  Hepatic steatosis.          DISCHARGE MEDS  New Prescriptions    No medications on file       I, Krupa Fitzpatrick am serving as a scribe to document services personally performed by En Huerta MD, based on my observation and the provider's statements to me. I, En Huerta MD, attest that Krupa Fitzpatrick is acting in a scribe capacity, has observed my performance of the services and has documented them in accordance with my direction.     En Huerta MD  [facility] EMERGENCY DEPARTMENT  [address] [address]  [phone]         En Huerta MD  06/10/24 6252

## 2024-06-11 NOTE — DISCHARGE INSTRUCTIONS
Your hemoglobin and blood work today is reassuring.  I would continue to monitor your symptoms.  If you have suddenly worsening abdominal pain, vomit with blood in it, lightheadedness, dizziness, loss of consciousness or shortness of breath or chest pain please return to the emergency department for repeat evaluation.  Otherwise recommend following up with your GI doctor as you may need another colonoscopy.

## 2024-07-01 ENCOUNTER — APPOINTMENT (OUTPATIENT)
Dept: RADIOLOGY | Facility: HOSPITAL | Age: 69
End: 2024-07-01
Attending: EMERGENCY MEDICINE
Payer: COMMERCIAL

## 2024-07-01 ENCOUNTER — HOSPITAL ENCOUNTER (OUTPATIENT)
Facility: HOSPITAL | Age: 69
Setting detail: OBSERVATION
Discharge: HOME | End: 2024-07-02
Attending: EMERGENCY MEDICINE | Admitting: INTERNAL MEDICINE
Payer: COMMERCIAL

## 2024-07-01 DIAGNOSIS — M54.50 ACUTE MIDLINE LOW BACK PAIN WITHOUT SCIATICA: ICD-10-CM

## 2024-07-01 DIAGNOSIS — G70.00 MYASTHENIA GRAVIS (H): Primary | ICD-10-CM

## 2024-07-01 DIAGNOSIS — G70.01 MYASTHENIA GRAVIS WITH (ACUTE) EXACERBATION (H): ICD-10-CM

## 2024-07-01 PROBLEM — N02.B9 IGA NEPHROPATHY: Status: ACTIVE | Noted: 2018-09-25

## 2024-07-01 PROBLEM — I10 PRIMARY HYPERTENSION: Status: ACTIVE | Noted: 2023-08-31

## 2024-07-01 PROBLEM — N18.31 STAGE 3A CHRONIC KIDNEY DISEASE (H): Status: ACTIVE | Noted: 2023-08-31

## 2024-07-01 PROBLEM — C61 PROSTATE CANCER (H): Status: ACTIVE | Noted: 2021-11-29

## 2024-07-01 PROBLEM — L40.50 PSORIATIC ARTHRITIS (H): Status: ACTIVE | Noted: 2021-03-04

## 2024-07-01 PROBLEM — T38.0X5A STEROID-INDUCED HYPERGLYCEMIA: Status: ACTIVE | Noted: 2023-08-31

## 2024-07-01 PROBLEM — R73.9 STEROID-INDUCED HYPERGLYCEMIA: Status: ACTIVE | Noted: 2023-08-31

## 2024-07-01 LAB
ANION GAP SERPL CALCULATED.3IONS-SCNC: 13 MMOL/L (ref 7–15)
APTT PPP: 23 SECONDS (ref 22–38)
BASOPHILS # BLD AUTO: 0 10E3/UL (ref 0–0.2)
BASOPHILS NFR BLD AUTO: 0 %
BUN SERPL-MCNC: 24.8 MG/DL (ref 8–23)
CALCIUM SERPL-MCNC: 9.3 MG/DL (ref 8.8–10.2)
CHLORIDE SERPL-SCNC: 101 MMOL/L (ref 98–107)
CREAT SERPL-MCNC: 1.22 MG/DL (ref 0.67–1.17)
D DIMER PPP FEU-MCNC: 0.67 UG/ML FEU (ref 0–0.5)
DEPRECATED HCO3 PLAS-SCNC: 24 MMOL/L (ref 22–29)
EGFRCR SERPLBLD CKD-EPI 2021: 64 ML/MIN/1.73M2
EOSINOPHIL # BLD AUTO: 0 10E3/UL (ref 0–0.7)
EOSINOPHIL NFR BLD AUTO: 0 %
ERYTHROCYTE [DISTWIDTH] IN BLOOD BY AUTOMATED COUNT: 13.2 % (ref 10–15)
FLUAV RNA SPEC QL NAA+PROBE: NEGATIVE
FLUBV RNA RESP QL NAA+PROBE: NEGATIVE
GLUCOSE SERPL-MCNC: 128 MG/DL (ref 70–99)
HCT VFR BLD AUTO: 39.4 % (ref 40–53)
HGB BLD-MCNC: 12.8 G/DL (ref 13.3–17.7)
HOLD SPECIMEN: NORMAL
IMM GRANULOCYTES # BLD: 0.1 10E3/UL
IMM GRANULOCYTES NFR BLD: 1 %
INR PPP: 1.16 (ref 0.85–1.15)
LYMPHOCYTES # BLD AUTO: 0.5 10E3/UL (ref 0.8–5.3)
LYMPHOCYTES NFR BLD AUTO: 5 %
MCH RBC QN AUTO: 29.6 PG (ref 26.5–33)
MCHC RBC AUTO-ENTMCNC: 32.5 G/DL (ref 31.5–36.5)
MCV RBC AUTO: 91 FL (ref 78–100)
MONOCYTES # BLD AUTO: 0.3 10E3/UL (ref 0–1.3)
MONOCYTES NFR BLD AUTO: 3 %
NEUTROPHILS # BLD AUTO: 8.8 10E3/UL (ref 1.6–8.3)
NEUTROPHILS NFR BLD AUTO: 92 %
NRBC # BLD AUTO: 0 10E3/UL
NRBC BLD AUTO-RTO: 0 /100
NT-PROBNP SERPL-MCNC: 193 PG/ML (ref 0–900)
PLATELET # BLD AUTO: 201 10E3/UL (ref 150–450)
POTASSIUM SERPL-SCNC: 4.9 MMOL/L (ref 3.4–5.3)
RBC # BLD AUTO: 4.33 10E6/UL (ref 4.4–5.9)
RSV RNA SPEC NAA+PROBE: NEGATIVE
SARS-COV-2 RNA RESP QL NAA+PROBE: NEGATIVE
SODIUM SERPL-SCNC: 138 MMOL/L (ref 135–145)
TROPONIN T SERPL HS-MCNC: 16 NG/L
TROPONIN T SERPL HS-MCNC: 17 NG/L
WBC # BLD AUTO: 9.6 10E3/UL (ref 4–11)

## 2024-07-01 PROCEDURE — 83880 ASSAY OF NATRIURETIC PEPTIDE: CPT | Performed by: EMERGENCY MEDICINE

## 2024-07-01 PROCEDURE — 71046 X-RAY EXAM CHEST 2 VIEWS: CPT

## 2024-07-01 PROCEDURE — 80048 BASIC METABOLIC PNL TOTAL CA: CPT | Performed by: EMERGENCY MEDICINE

## 2024-07-01 PROCEDURE — 99285 EMERGENCY DEPT VISIT HI MDM: CPT | Mod: 25

## 2024-07-01 PROCEDURE — 36415 COLL VENOUS BLD VENIPUNCTURE: CPT | Performed by: EMERGENCY MEDICINE

## 2024-07-01 PROCEDURE — 999N000157 HC STATISTIC RCP TIME EA 10 MIN

## 2024-07-01 PROCEDURE — 210N000001 HC R&B IMCU HEART CARE

## 2024-07-01 PROCEDURE — 85025 COMPLETE CBC W/AUTO DIFF WBC: CPT | Performed by: EMERGENCY MEDICINE

## 2024-07-01 PROCEDURE — 99223 1ST HOSP IP/OBS HIGH 75: CPT | Performed by: HOSPITALIST

## 2024-07-01 PROCEDURE — 84484 ASSAY OF TROPONIN QUANT: CPT | Performed by: EMERGENCY MEDICINE

## 2024-07-01 PROCEDURE — 94150 VITAL CAPACITY TEST: CPT

## 2024-07-01 PROCEDURE — 85379 FIBRIN DEGRADATION QUANT: CPT | Performed by: EMERGENCY MEDICINE

## 2024-07-01 PROCEDURE — 87637 SARSCOV2&INF A&B&RSV AMP PRB: CPT | Performed by: EMERGENCY MEDICINE

## 2024-07-01 PROCEDURE — 85610 PROTHROMBIN TIME: CPT | Performed by: EMERGENCY MEDICINE

## 2024-07-01 PROCEDURE — 85730 THROMBOPLASTIN TIME PARTIAL: CPT | Performed by: EMERGENCY MEDICINE

## 2024-07-01 PROCEDURE — 93005 ELECTROCARDIOGRAM TRACING: CPT | Performed by: EMERGENCY MEDICINE

## 2024-07-01 RX ORDER — PREDNISONE 10 MG/1
10 TABLET ORAL EVERY OTHER DAY
Status: DISCONTINUED | OUTPATIENT
Start: 2024-07-02 | End: 2024-07-02 | Stop reason: HOSPADM

## 2024-07-01 RX ORDER — FLUOCINONIDE 0.5 MG/G
CREAM TOPICAL DAILY PRN
Status: DISCONTINUED | OUTPATIENT
Start: 2024-07-01 | End: 2024-07-02 | Stop reason: HOSPADM

## 2024-07-01 RX ORDER — CALCIUM CARBONATE 500(1250)
1 TABLET ORAL DAILY
COMMUNITY

## 2024-07-01 RX ORDER — ONDANSETRON 2 MG/ML
4 INJECTION INTRAMUSCULAR; INTRAVENOUS EVERY 6 HOURS PRN
Status: DISCONTINUED | OUTPATIENT
Start: 2024-07-01 | End: 2024-07-02

## 2024-07-01 RX ORDER — HYDRALAZINE HYDROCHLORIDE 10 MG/1
10 TABLET, FILM COATED ORAL EVERY 4 HOURS PRN
Status: DISCONTINUED | OUTPATIENT
Start: 2024-07-01 | End: 2024-07-02 | Stop reason: HOSPADM

## 2024-07-01 RX ORDER — ACETAMINOPHEN 650 MG/1
650 SUPPOSITORY RECTAL EVERY 4 HOURS PRN
Status: DISCONTINUED | OUTPATIENT
Start: 2024-07-01 | End: 2024-07-02

## 2024-07-01 RX ORDER — IBUPROFEN 200 MG
1 CAPSULE ORAL DAILY
COMMUNITY

## 2024-07-01 RX ORDER — ACETAMINOPHEN 325 MG/1
650 TABLET ORAL EVERY 4 HOURS PRN
Status: DISCONTINUED | OUTPATIENT
Start: 2024-07-01 | End: 2024-07-02

## 2024-07-01 RX ORDER — PYRIDOSTIGMINE BROMIDE 60 MG/1
60 TABLET ORAL 2 TIMES DAILY WITH MEALS
Status: ON HOLD | COMMUNITY
End: 2024-07-02

## 2024-07-01 RX ORDER — HYDRALAZINE HYDROCHLORIDE 20 MG/ML
10 INJECTION INTRAMUSCULAR; INTRAVENOUS EVERY 4 HOURS PRN
Status: DISCONTINUED | OUTPATIENT
Start: 2024-07-01 | End: 2024-07-02 | Stop reason: HOSPADM

## 2024-07-01 RX ORDER — CALCIUM CARBONATE 500(1250)
500 TABLET ORAL DAILY
Status: DISCONTINUED | OUTPATIENT
Start: 2024-07-02 | End: 2024-07-02 | Stop reason: HOSPADM

## 2024-07-01 RX ORDER — PREDNISONE 20 MG/1
40 TABLET ORAL SEE ADMIN INSTRUCTIONS
COMMUNITY
End: 2024-09-06 | Stop reason: DRUGHIGH

## 2024-07-01 RX ORDER — CALCITONIN SALMON 200 [IU]/.09ML
1 SPRAY, METERED NASAL DAILY
COMMUNITY
End: 2024-07-01

## 2024-07-01 RX ORDER — LEVOTHYROXINE SODIUM 112 UG/1
112 TABLET ORAL DAILY
Status: DISCONTINUED | OUTPATIENT
Start: 2024-07-02 | End: 2024-07-02 | Stop reason: HOSPADM

## 2024-07-01 RX ORDER — HYDROMORPHONE HYDROCHLORIDE 2 MG/1
2-4 TABLET ORAL EVERY 8 HOURS PRN
Status: ON HOLD | COMMUNITY
End: 2024-07-02

## 2024-07-01 RX ORDER — AMLODIPINE BESYLATE 10 MG/1
10 TABLET ORAL EVERY MORNING
COMMUNITY

## 2024-07-01 RX ORDER — FLUOCINONIDE 0.5 MG/G
CREAM TOPICAL DAILY PRN
COMMUNITY

## 2024-07-01 RX ORDER — PREDNISONE 20 MG/1
40 TABLET ORAL EVERY OTHER DAY
Status: DISCONTINUED | OUTPATIENT
Start: 2024-07-03 | End: 2024-07-02 | Stop reason: HOSPADM

## 2024-07-01 RX ORDER — ONDANSETRON 4 MG/1
4 TABLET, ORALLY DISINTEGRATING ORAL EVERY 8 HOURS PRN
COMMUNITY

## 2024-07-01 RX ORDER — CALCITONIN SALMON 200 [IU]/.09ML
1 SPRAY, METERED NASAL DAILY
Status: DISCONTINUED | OUTPATIENT
Start: 2024-07-02 | End: 2024-07-02

## 2024-07-01 RX ORDER — GUAIFENESIN 200 MG/10ML
200 LIQUID ORAL EVERY 4 HOURS PRN
Status: DISCONTINUED | OUTPATIENT
Start: 2024-07-01 | End: 2024-07-02 | Stop reason: HOSPADM

## 2024-07-01 RX ORDER — LIDOCAINE 4 G/G
1 PATCH TOPICAL
Status: DISCONTINUED | OUTPATIENT
Start: 2024-07-01 | End: 2024-07-02

## 2024-07-01 RX ORDER — PROCHLORPERAZINE 25 MG
12.5 SUPPOSITORY, RECTAL RECTAL EVERY 12 HOURS PRN
Status: DISCONTINUED | OUTPATIENT
Start: 2024-07-01 | End: 2024-07-02

## 2024-07-01 RX ORDER — LIDOCAINE 40 MG/G
CREAM TOPICAL
Status: DISCONTINUED | OUTPATIENT
Start: 2024-07-01 | End: 2024-07-02 | Stop reason: HOSPADM

## 2024-07-01 RX ORDER — PYRIDOSTIGMINE BROMIDE 60 MG/1
60 TABLET ORAL 2 TIMES DAILY WITH MEALS
Status: DISCONTINUED | OUTPATIENT
Start: 2024-07-02 | End: 2024-07-02

## 2024-07-01 RX ORDER — MYCOPHENOLATE MOFETIL 250 MG/1
750 CAPSULE ORAL
Status: DISCONTINUED | OUTPATIENT
Start: 2024-07-01 | End: 2024-07-02

## 2024-07-01 RX ORDER — IBUPROFEN 200 MG
950 CAPSULE ORAL DAILY
Status: DISCONTINUED | OUTPATIENT
Start: 2024-07-02 | End: 2024-07-02 | Stop reason: HOSPADM

## 2024-07-01 RX ORDER — CALCITONIN SALMON 200 [IU]/.09ML
1 SPRAY, METERED NASAL DAILY
COMMUNITY
End: 2024-07-15

## 2024-07-01 RX ORDER — AMLODIPINE BESYLATE 5 MG/1
10 TABLET ORAL EVERY MORNING
Status: DISCONTINUED | OUTPATIENT
Start: 2024-07-02 | End: 2024-07-02 | Stop reason: HOSPADM

## 2024-07-01 RX ORDER — ONDANSETRON 4 MG/1
4 TABLET, ORALLY DISINTEGRATING ORAL EVERY 6 HOURS PRN
Status: DISCONTINUED | OUTPATIENT
Start: 2024-07-01 | End: 2024-07-02 | Stop reason: HOSPADM

## 2024-07-01 RX ORDER — HYDROMORPHONE HYDROCHLORIDE 2 MG/1
2-4 TABLET ORAL EVERY 8 HOURS PRN
Status: DISCONTINUED | OUTPATIENT
Start: 2024-07-01 | End: 2024-07-02

## 2024-07-01 RX ORDER — PROCHLORPERAZINE MALEATE 5 MG
5 TABLET ORAL EVERY 6 HOURS PRN
Status: DISCONTINUED | OUTPATIENT
Start: 2024-07-01 | End: 2024-07-02 | Stop reason: HOSPADM

## 2024-07-01 RX ORDER — PREDNISONE 10 MG/1
10 TABLET ORAL SEE ADMIN INSTRUCTIONS
COMMUNITY
End: 2024-09-06

## 2024-07-01 ASSESSMENT — ACTIVITIES OF DAILY LIVING (ADL)
ADLS_ACUITY_SCORE: 35

## 2024-07-01 ASSESSMENT — ENCOUNTER SYMPTOMS
ABDOMINAL PAIN: 0
COUGH: 0
SHORTNESS OF BREATH: 1

## 2024-07-01 ASSESSMENT — COLUMBIA-SUICIDE SEVERITY RATING SCALE - C-SSRS
6. HAVE YOU EVER DONE ANYTHING, STARTED TO DO ANYTHING, OR PREPARED TO DO ANYTHING TO END YOUR LIFE?: NO
2. HAVE YOU ACTUALLY HAD ANY THOUGHTS OF KILLING YOURSELF IN THE PAST MONTH?: NO
1. IN THE PAST MONTH, HAVE YOU WISHED YOU WERE DEAD OR WISHED YOU COULD GO TO SLEEP AND NOT WAKE UP?: NO

## 2024-07-01 NOTE — ED PROVIDER NOTES
EMERGENCY DEPARTMENT ENCOUNTER      NAME: Maurice Biswas  AGE: 69 year old male  YOB: 1955  MRN: [id number]  EVALUATION DATE & TIME: No admission date for patient encounter.    PCP: Esa Valdivia    ED PROVIDER: Dwain aTn M.D.      Chief Complaint   Patient presents with    Shortness of Breath         FINAL IMPRESSION:  1.  Acute dyspnea.  2.  Chronic myasthenia gravis.      ED COURSE & MEDICAL DECISION MAKIN:59 PM I met with the patient to gather history and to perform my initial exam. We discussed plans for the ED course, including diagnostic testing and treatment. PPE worn: cloth mask.  Patient with chronic myasthenia gravis and notes feeling short of breath over the last several days.  Breathing is better when standing up.  In the past he has had similar presentation and eventually required 3 exchange transfusions.  Evaluation for shortness of breath proceeding.  9:03 PM BUN/creatinine mildly elevated 28 and 1.22.  Troponin was 17 and repeat was 16.  EKG unremarkable.  BNP negative.  CBC negative.  D-dimer normal for age.  Coags negative.  Viral testing negative.  Chest x-ray is clear.  Patient still feels subjectively short of breath and he feels it is worse than his admission in November.  Accordingly, we will page his neurology and discussed possible admission with them.  This 9:25 PM.  I spoke with Dr. French from neurology.  He recommends continuing the patient on his current home medications of CellCept, Mestinon, and prednisone.  Recommends the hospitalist order NIF and vital capacity every 8 hours.  He also asked to discuss this with nephrology tonight and asked them to see the patient tomorrow and set up 3 every other day exchange transfusions for plasmapheresis.  We will page the hospitalist and nephrology.  Results communicated to the patient.    Pertinent Labs & Imaging studies reviewed. (See chart for details)  69 year old male presents to the Emergency Department for  evaluation of shortness of breath.    At the conclusion of the encounter I discussed the results of all of the tests and the disposition. The questions were answered. The patient or family acknowledged understanding and was agreeable with the care plan.              Medical Decision Making  Obtained supplemental history:Supplemental history obtained?: No  Reviewed external records: Both inpatient and outpatient computer records were reviewed.   Care impacted by chronic illness:Hypertension and Other: Myasthenia gravis  Care significantly affected by social determinants of health:Access to Medical Care  Did you consider but not order tests?: Work up considered but not performed and documented in chart, if applicable  Did you interpret images independently?: Independent interpretation of ECG and images noted in documentation, when applicable.  Consultation discussion with other provider:Did you involve another provider (consultant, , pharmacy, etc.)?: I discussed the care with another health care provider, see documentation for details.  Possible admission after evaluation.      MEDICATIONS GIVEN IN THE EMERGENCY:  Medications - No data to display    NEW PRESCRIPTIONS STARTED AT TODAY'S ER VISIT  New Prescriptions    No medications on file          =================================================================    HPI    Patient information was obtained from: patient    Use of : N/A         Maurice Biswas is a 69 year old male with a pertinent history of Myasthenia Gravis, primary HTN, who presents to this ED via private vehicle with family for evaluation of shortness of breath.    Patient reports persistent shortness of breath for the past 3 days. He feels like this is similar to his previous myasthenia gravis flare up (last one in 9/2023 where he needed 3 blood exchanges during admission). He follows with neurology, Dr. Alamo. He otherwise denies associating cough, chest pain, and abdominal pain. He  was a former smoker about 41 years ago.    He does not identify any waxing or waning symptoms otherwise, exacerbating or alleviating features, associated symptoms except as mentioned. He denies any pain related complaints.    REVIEW OF SYSTEMS   Review of Systems   Respiratory:  Positive for shortness of breath. Negative for cough.    Cardiovascular:  Negative for chest pain.   Gastrointestinal:  Negative for abdominal pain.   All other systems reviewed and are negative.       PAST MEDICAL HISTORY:  History reviewed. No pertinent past medical history.    PAST SURGICAL HISTORY:  Past Surgical History:   Procedure Laterality Date    IR CVC NON TUNNEL PLACEMENT > 5 YRS  8/31/2023    IR RENAL BIOPSY LEFT  8/20/2018    PICC SINGLE LUMEN PLACEMENT  1/24/2024           CURRENT MEDICATIONS:    amLODIPine (NORVASC) 10 MG tablet  Calcium Carbonate-Vitamin D (OYSTER SHELL CALCIUM/D) 500-5 MG-MCG TABS  fish oil-omega-3 fatty acids 1000 MG capsule  fluocinonide (LIDEX) 0.05 % external solution  fluticasone (FLONASE) 50 mcg/actuation nasal spray  levothyroxine (SYNTHROID/LEVOTHROID) 112 MCG tablet  mycophenolate (GENERIC EQUIVALENT) 500 MG tablet  predniSONE (DELTASONE) 10 MG tablet  predniSONE (DELTASONE) 20 MG tablet  pyRIDostigmine (MESTINON) 60 MG tablet  traMADol (ULTRAM) 50 MG tablet        ALLERGIES:  Allergies   Allergen Reactions    Doxycycline Muscle Pain (Myalgia) and Other (See Comments)     Brought on Myasthenia Gravis       FAMILY HISTORY:  History reviewed. No pertinent family history.    SOCIAL HISTORY:   Social History     Socioeconomic History    Marital status:      Spouse name: None    Number of children: None    Years of education: None    Highest education level: None   Tobacco Use    Smoking status: Never    Smokeless tobacco: Never   Substance and Sexual Activity    Alcohol use: No    Drug use: No    Sexual activity: Yes     Partners: Female     Social Determinants of Health      Received from  "[facility]    Financial Resource Strain    [facility]    Social Connections     No drugs, alcohol, or tobacco.  VITALS:  BP (!) 151/94   Pulse 87   Temp 98.8  F (37.1  C) (Temporal)   Resp 16   Ht 1.753 m (5' 9\")   Wt 83.9 kg (185 lb)   SpO2 99%   BMI 27.32 kg/m      PHYSICAL EXAM    Vital Signs:  BP (!) 151/94   Pulse 87   Temp 98.8  F (37.1  C) (Temporal)   Resp 16   Ht 1.753 m (5' 9\")   Wt 83.9 kg (185 lb)   SpO2 99%   BMI 27.32 kg/m    General:  On entering the room he is in no apparent distress.  Wearing a torso brace.  Neck:  Neck supple with full range of motion and nontender.    Back:  Back and spine are nontender.  No costovertebral angle tenderness.    HEENT:  Oropharynx clear with moist mucous membranes.  HEENT unremarkable.    Pulmonary:  Chest clear to auscultation without rhonchi rales or wheezing.    Cardiovascular:  Cardiac regular rate and rhythm without murmurs rubs or gallops.    Abdomen:  Abdomen soft nontender.  There is no rebound or guarding.    Muskuloskeletal:  He moves all 4 without any difficulty and has normal neurovascular exams.  Extremities without clubbing, cyanosis, or edema.  Legs and calves are nontender.    Neuro:  He is alert and oriented ×3 and moves all extremities symmetrically.    Psych:  Normal affect.    Skin:  Unremarkable and warm and dry.       LAB:  All pertinent labs reviewed and interpreted.  Labs Ordered and Resulted from Time of ED Arrival to Time of ED Departure   BASIC METABOLIC PANEL - Abnormal       Result Value    Sodium 138      Potassium 4.9      Chloride 101      Carbon Dioxide (CO2) 24      Anion Gap 13      Urea Nitrogen 24.8 (*)     Creatinine 1.22 (*)     GFR Estimate 64      Calcium 9.3      Glucose 128 (*)    D DIMER QUANTITATIVE - Abnormal    D-Dimer Quantitative " 0.67 (*)    INR - Abnormal    INR 1.16 (*)    CBC WITH PLATELETS AND DIFFERENTIAL - Abnormal    WBC Count 9.6      RBC Count 4.33 (*)     Hemoglobin 12.8 (*)     Hematocrit 39.4 (*)     MCV 91      MCH 29.6      MCHC 32.5      RDW 13.2      Platelet Count 201      % Neutrophils 92      % Lymphocytes 5      % Monocytes 3      % Eosinophils 0      % Basophils 0      % Immature Granulocytes 1      NRBCs per 100 WBC 0      Absolute Neutrophils 8.8 (*)     Absolute Lymphocytes 0.5 (*)     Absolute Monocytes 0.3      Absolute Eosinophils 0.0      Absolute Basophils 0.0      Absolute Immature Granulocytes 0.1      Absolute NRBCs 0.0     TROPONIN T, HIGH SENSITIVITY - Normal    Troponin T, High Sensitivity 17     NT PROBNP INPATIENT - Normal    N terminal Pro BNP Inpatient 193     PARTIAL THROMBOPLASTIN TIME - Normal    aPTT 23     INFLUENZA A/B, RSV, & SARS-COV2 PCR - Normal    Influenza A PCR Negative      Influenza B PCR Negative      RSV PCR Negative      SARS CoV2 PCR Negative     TROPONIN T, HIGH SENSITIVITY - Normal    Troponin T, High Sensitivity 16         RADIOLOGY:  Reviewed all pertinent imaging. Please see official radiology report.  XR Chest 2 Views   Final Result   IMPRESSION: Lungs are clear. No effusions or pneumothorax. Heart size is normal. Vertebroplasty changes in the spine. Degenerative changes of the spine. No acute osseous findings.                 EKG:    Normal sinus rhythm at 72, left axis deviation, possible old anterior lateral wall MI versus poor R wave progression.  Otherwise no acute findings.  Very similar to previous EKG of November 2023.    I have independently reviewed and interpreted the EKG(s) documented above.    PROCEDURES:         I, Monica Georges, am serving as a scribe to document services personally performed by Dr. Tan based on my observation and the provider's statements to me. I, Dwain Tan MD attest that Monica Georges is acting in a scribe capacity, has observed my performance of  the services and has documented them in accordance with my direction.    Dwain Tan M.D.  Emergency Medicine  [facility] EMERGENCY DEPARTMENT  [address] [place] [id number] [phone]  Dept: [phone]       Dwain Tan MD  07/01/24 2107       Dwain Tan MD  07/01/24 2127

## 2024-07-01 NOTE — ED TRIAGE NOTES
The patient thinks his Mysthenia Gravis is acting up. Pt reports SOB over the past three days with no other sx's. Pt reports requiring transfusions in the past. Pt reports standing up improves the SOB. The pt presents wearing a brace due to recent fractures, repair on June 4th 2024.      Triage Assessment (Adult)       Row Name 07/01/24 1730          Triage Assessment    Airway WDL WDL        Respiratory WDL    Respiratory WDL X;rhythm/pattern     Rhythm/Pattern, Respiratory shortness of breath        Skin Circulation/Temperature WDL    Skin Circulation/Temperature WDL WDL        Cardiac WDL    Cardiac WDL WDL        Peripheral/Neurovascular WDL    Peripheral Neurovascular WDL WDL        Cognitive/Neuro/Behavioral WDL    Cognitive/Neuro/Behavioral WDL WDL

## 2024-07-02 ENCOUNTER — APPOINTMENT (OUTPATIENT)
Dept: OCCUPATIONAL THERAPY | Facility: HOSPITAL | Age: 69
End: 2024-07-02
Attending: INTERNAL MEDICINE
Payer: COMMERCIAL

## 2024-07-02 VITALS
HEART RATE: 63 BPM | HEIGHT: 69 IN | OXYGEN SATURATION: 96 % | SYSTOLIC BLOOD PRESSURE: 129 MMHG | WEIGHT: 183.4 LBS | BODY MASS INDEX: 27.16 KG/M2 | DIASTOLIC BLOOD PRESSURE: 80 MMHG | RESPIRATION RATE: 16 BRPM | TEMPERATURE: 97.9 F

## 2024-07-02 PROBLEM — G70.00 MYASTHENIA GRAVIS (H): Status: ACTIVE | Noted: 2023-08-30

## 2024-07-02 LAB
GLUCOSE BLDC GLUCOMTR-MCNC: 102 MG/DL (ref 70–99)
GLUCOSE BLDC GLUCOMTR-MCNC: 109 MG/DL (ref 70–99)
GLUCOSE BLDC GLUCOMTR-MCNC: 110 MG/DL (ref 70–99)

## 2024-07-02 PROCEDURE — 99238 HOSP IP/OBS DSCHRG MGMT 30/<: CPT | Performed by: INTERNAL MEDICINE

## 2024-07-02 PROCEDURE — 250N000013 HC RX MED GY IP 250 OP 250 PS 637: Performed by: PAIN MEDICINE

## 2024-07-02 PROCEDURE — G0378 HOSPITAL OBSERVATION PER HR: HCPCS

## 2024-07-02 PROCEDURE — 99222 1ST HOSP IP/OBS MODERATE 55: CPT | Performed by: PAIN MEDICINE

## 2024-07-02 PROCEDURE — 250N000012 HC RX MED GY IP 250 OP 636 PS 637: Performed by: PSYCHIATRY & NEUROLOGY

## 2024-07-02 PROCEDURE — 250N000012 HC RX MED GY IP 250 OP 636 PS 637: Performed by: HOSPITALIST

## 2024-07-02 PROCEDURE — 250N000013 HC RX MED GY IP 250 OP 250 PS 637: Performed by: PSYCHIATRY & NEUROLOGY

## 2024-07-02 PROCEDURE — 94150 VITAL CAPACITY TEST: CPT

## 2024-07-02 PROCEDURE — 97165 OT EVAL LOW COMPLEX 30 MIN: CPT | Mod: GO

## 2024-07-02 PROCEDURE — 250N000013 HC RX MED GY IP 250 OP 250 PS 637: Performed by: HOSPITALIST

## 2024-07-02 PROCEDURE — 99418 PROLNG IP/OBS E/M EA 15 MIN: CPT | Performed by: PSYCHIATRY & NEUROLOGY

## 2024-07-02 PROCEDURE — 99223 1ST HOSP IP/OBS HIGH 75: CPT | Performed by: PSYCHIATRY & NEUROLOGY

## 2024-07-02 PROCEDURE — 97535 SELF CARE MNGMENT TRAINING: CPT | Mod: GO

## 2024-07-02 RX ORDER — LIDOCAINE 4 G/G
3 PATCH TOPICAL
Status: DISCONTINUED | OUTPATIENT
Start: 2024-07-02 | End: 2024-07-02 | Stop reason: HOSPADM

## 2024-07-02 RX ORDER — MYCOPHENOLATE MOFETIL 500 MG/1
1000 TABLET ORAL 2 TIMES DAILY
Qty: 120 TABLET | Refills: 0 | Status: SHIPPED | OUTPATIENT
Start: 2024-07-02 | End: 2024-07-15

## 2024-07-02 RX ORDER — PYRIDOSTIGMINE BROMIDE 60 MG/1
60 TABLET ORAL EVERY 8 HOURS SCHEDULED
Status: DISCONTINUED | OUTPATIENT
Start: 2024-07-02 | End: 2024-07-02 | Stop reason: HOSPADM

## 2024-07-02 RX ORDER — ACETAMINOPHEN 325 MG/1
975 TABLET ORAL EVERY 6 HOURS
Status: DISCONTINUED | OUTPATIENT
Start: 2024-07-02 | End: 2024-07-02 | Stop reason: HOSPADM

## 2024-07-02 RX ORDER — MYCOPHENOLATE MOFETIL 250 MG/1
1000 CAPSULE ORAL
Status: DISCONTINUED | OUTPATIENT
Start: 2024-07-02 | End: 2024-07-02

## 2024-07-02 RX ORDER — MYCOPHENOLATE MOFETIL 500 MG/1
1000 TABLET ORAL
Status: DISCONTINUED | OUTPATIENT
Start: 2024-07-02 | End: 2024-07-02 | Stop reason: HOSPADM

## 2024-07-02 RX ORDER — ACETAMINOPHEN 325 MG/1
975 TABLET ORAL EVERY 6 HOURS
Qty: 48 TABLET | Refills: 0 | Status: SHIPPED | OUTPATIENT
Start: 2024-07-02 | End: 2024-07-06

## 2024-07-02 RX ORDER — NALOXONE HYDROCHLORIDE 0.4 MG/ML
0.4 INJECTION, SOLUTION INTRAMUSCULAR; INTRAVENOUS; SUBCUTANEOUS
Status: DISCONTINUED | OUTPATIENT
Start: 2024-07-02 | End: 2024-07-02 | Stop reason: HOSPADM

## 2024-07-02 RX ORDER — PYRIDOSTIGMINE BROMIDE 60 MG/1
60 TABLET ORAL EVERY 8 HOURS
Qty: 90 TABLET | Refills: 0 | Status: SHIPPED | OUTPATIENT
Start: 2024-07-02 | End: 2024-07-15

## 2024-07-02 RX ORDER — LIDOCAINE 4 G/G
3 PATCH TOPICAL EVERY 24 HOURS
Qty: 6 PATCH | Refills: 1 | Status: SHIPPED | OUTPATIENT
Start: 2024-07-02

## 2024-07-02 RX ORDER — HYDROMORPHONE HYDROCHLORIDE 2 MG/1
2 TABLET ORAL EVERY 8 HOURS PRN
Status: SHIPPED
Start: 2024-07-02

## 2024-07-02 RX ORDER — NALOXONE HYDROCHLORIDE 0.4 MG/ML
0.2 INJECTION, SOLUTION INTRAMUSCULAR; INTRAVENOUS; SUBCUTANEOUS
Status: DISCONTINUED | OUTPATIENT
Start: 2024-07-02 | End: 2024-07-02 | Stop reason: HOSPADM

## 2024-07-02 RX ORDER — HYDROMORPHONE HYDROCHLORIDE 2 MG/1
2 TABLET ORAL EVERY 8 HOURS PRN
Status: DISCONTINUED | OUTPATIENT
Start: 2024-07-02 | End: 2024-07-02 | Stop reason: HOSPADM

## 2024-07-02 RX ORDER — PYRIDOSTIGMINE BROMIDE 60 MG/1
60 TABLET ORAL EVERY 8 HOURS SCHEDULED
Status: DISCONTINUED | OUTPATIENT
Start: 2024-07-02 | End: 2024-07-02

## 2024-07-02 RX ADMIN — LIDOCAINE 1 PATCH: 4 PATCH TOPICAL at 00:11

## 2024-07-02 RX ADMIN — AMLODIPINE BESYLATE 10 MG: 5 TABLET ORAL at 09:11

## 2024-07-02 RX ADMIN — PREDNISONE 10 MG: 10 TABLET ORAL at 09:12

## 2024-07-02 RX ADMIN — PYRIDOSTIGMINE BROMIDE 60 MG: 60 TABLET ORAL at 10:13

## 2024-07-02 RX ADMIN — MYCOPHENOLATE MOFETIL 750 MG: 250 CAPSULE ORAL at 00:30

## 2024-07-02 RX ADMIN — MYCOPHENOLATE MOFETIL 1000 MG: 500 TABLET, FILM COATED ORAL at 10:52

## 2024-07-02 RX ADMIN — ACETAMINOPHEN 975 MG: 325 TABLET ORAL at 09:10

## 2024-07-02 RX ADMIN — PYRIDOSTIGMINE BROMIDE 60 MG: 60 TABLET ORAL at 13:28

## 2024-07-02 RX ADMIN — SALINE NASAL SPRAY 1 SPRAY: 1.5 SOLUTION NASAL at 00:16

## 2024-07-02 RX ADMIN — LIDOCAINE 3 PATCH: 4 PATCH TOPICAL at 09:09

## 2024-07-02 RX ADMIN — CALCIUM 500 MG: 500 TABLET ORAL at 09:13

## 2024-07-02 RX ADMIN — LEVOTHYROXINE SODIUM 112 MCG: 0.11 TABLET ORAL at 09:12

## 2024-07-02 RX ADMIN — ACETAMINOPHEN 975 MG: 325 TABLET ORAL at 13:27

## 2024-07-02 ASSESSMENT — ACTIVITIES OF DAILY LIVING (ADL)
ADLS_ACUITY_SCORE: 26
DEPENDENT_IADLS:: INDEPENDENT
ADLS_ACUITY_SCORE: 26

## 2024-07-02 NOTE — PROGRESS NOTES
Renal    Discussed with Dr. Hyatt. Plans for plasma exchange cancelled.  Patient was not seen.  Call us if needed.    Isaías Patel MD  Nephrology

## 2024-07-02 NOTE — PROGRESS NOTES
Discussed with Neurology and unlikely MG crisis and more likely poor inspiration due to pain. No Plasmapheresis needed and plan to control pain.    Pending PT/OT assessment plan to discharge this afternoon and discussed with patient who is in agreement

## 2024-07-02 NOTE — CONSULTS
ORTHOPEDIC CONSULTATION    Consultation  Maurice Biswas,  1955, MRN [id number]    Myasthenia gravis with (acute) exacerbation (H) [G70.01]    PCP: Esa Valdivia, [phone]   Code status:  Full Code       Extended Emergency Contact Information  Primary Emergency Contact: Naomi Biswas  Address: [address] [place], [place] [address] [place] [address]  Home Phone: [phone]  Mobile Phone: [phone]  Relation: Spouse  Secondary Emergency Contact: Maurice Biswas Jr   [place]  Mobile Phone: [phone]  Relation: Son         IMPRESSION:  Compression fractures of L1, L2, and L4 status post kyphoplasty's.  No change in pain since last kyphoplasty on  at the L2 and L4 levels.  No new radicular pain     PLAN:  This patient was discussed with Dr. Francisco Bush, on-call surgeon for [facility] and they are in agreement with the following plan.  -No plan for urgent surgical intervention at this time.  Patient has ongoing pain due to chronic compression fractures of the lumbar spine.  He is not developing any new radicular pain, pain is the same as it has been since prior to the most recent kyphoplasty.  He had failure of his kyphoplasty at L1 level and it is very likely that this failed kyphoplasty is what is causing his ongoing pain  -Continue bracing for stabilization.  -Inpatient pain team has been consulted and we would appreciate their recommendations.  -If pain is controlled and the patient is medically stable then we will continue to manage his spine as an outpatient areas there are no acute changes requiring intervention here inpatient is in agreement and understanding of this plan.    Thank you for including [facility] in the care of Maurice Biswas. It has been a pleasure participating in their care.        CHIEF COMPLAINT: Myasthenia gravis with (acute) exacerbation (H)    HISTORY OF PRESENT ILLNESS:  The patient is seen in orthopedic consultation at the request of  Kristy Rodriguez MD for compression fracture.  The patient is a 69 year old male       The patient presents today with ongoing pain in his low back due to chronic compression fractures.  He has been seen with [facility] as an outpatient by Dr. Francisco Bush.  He initially developed pain a few months ago and was found to have L1 compression fracture.  He had a kyphoplasty on 3/28 which did give him some relief of pain however pain returned and follow-up MRI redemonstrated Plasty failure and compression fracture at the L1 level in addition to compression fractures at L2 and L4.  He underwent kyphoplasty of the L2 and L4 levels on 6/4 but since then has not had any improvement in his pain.  He also has not had any increases or changes in his pain.  Pain is still at the same location.  He is not having any radicular pain or numbness/tingling in his legs.  Denies bowel/bladder change.  He does note severe claustrophobia and if he were to get an MRI he would like to do that as an outpatient at [facility] we can do an upright open MRI.    ALLERGIES:   Review of patient's allergies indicates   Allergies   Allergen Reactions    Doxycycline Muscle Pain (Myalgia) and Other (See Comments)     Brought on Myasthenia Gravis         MEDICATIONS UPON ADMISSION:  Medications were reviewed.  They include:   Medications Prior to Admission   Medication Sig Dispense Refill Last Dose    amLODIPine (NORVASC) 10 MG tablet Take 10 mg by mouth every morning   7/1/2024 at AM    calcitonin, salmon, (MIACALCIN) 200 UNIT/ACT nasal spray Spray 1 spray into one nostril alternating nostrils daily Alternate nostril each day.   Past Month at unknown    calcium carbonate (OS-SARAH) 500 MG tablet Take 1 tablet by mouth daily   7/1/2024 at AM    calcium citrate (CITRACAL) 950 (200 Ca) MG tablet Take 1 tablet by mouth daily   7/1/2024 at AM    fish oil-omega-3 fatty acids 1000 MG capsule Take 2 capsules by mouth 2 times daily   Past Month at unknown     fluocinonide (LIDEX) 0.05 % external cream Apply topically daily as needed (for psoriasis)   Unknown at PRN    fluocinonide (LIDEX) 0.05 % external solution Apply topically daily as needed (psoriasis)   Unknown at PRN    fluticasone (FLONASE) 50 mcg/actuation nasal spray Spray 2 sprays in nostril daily as needed   Past Week at PRN    HYDROmorphone (DILAUDID) 2 MG tablet Take 2-4 mg by mouth every 8 hours as needed for severe pain   7/1/2024 at 2mg AM    levothyroxine (SYNTHROID/LEVOTHROID) 112 MCG tablet Take 112 mcg by mouth daily   7/1/2024 at AM    mycophenolate (GENERIC EQUIVALENT) 500 MG tablet Take 1.5 tablets (750 mg) by mouth 2 times daily 90 tablet 11 7/1/2024 at AM    ondansetron (ZOFRAN ODT) 4 MG ODT tab Take 4 mg by mouth every 8 hours as needed for nausea (Takes before taking Hydromorphone to prevent nausea)   7/1/2024 at AM    predniSONE (DELTASONE) 10 MG tablet Take 10 mg by mouth See Admin Instructions Take 10mg every other morning with breakfast on EVEN days of the month.   6/30/2024 at 10 mg AM    predniSONE (DELTASONE) 20 MG tablet Take 40 mg by mouth See Admin Instructions Take 40 mg by mouth every other morning with breakfast on ODD days of the month.   7/1/2024 at 40mg AM    pyRIDostigmine (MESTINON) 60 MG tablet Take 60 mg by mouth 2 times daily (with meals) At breakfast and Lunch.   7/1/2024 at LUNCH    sodium chloride (OCEAN) 0.65 % nasal spray Spray 1 spray into both nostrils 3 times daily as needed for congestion   7/1/2024 at afternoon    Vitamin D-Vitamin K (VITAMIN K2-VITAMIN D3 PO) Take 1 tablet by mouth every morning   7/1/2024 at AM         SOCIAL HISTORY:   he  reports that he has never smoked. He has never used smokeless tobacco. He reports that he does not drink alcohol and does not use drugs.      FAMILY HISTORY:  family history is not on file.      REVIEW OF SYSTEMS:   Reviewed with patient. See HPI, otherwise negative       PHYSICAL EXAMINATION:  Vitals: /80 (BP Location:  "Right arm)   Pulse 63   Temp 97.9  F (36.6  C) (Oral)   Resp 16   Ht 1.753 m (5' 9\")   Wt 83.2 kg (183 lb 6.4 oz)   SpO2 96%   BMI 27.08 kg/m    General: On examination, the patient is resting comfortably, NAD, awake, and alert and oriented to person, place, time, and, and general circumstances   SKIN: There is no evidence of erythema, warmth, swelling, deformity, crepitus, break to the skin, open wound..  Discussed plan for outpatient follow-up with the patient and he  Pulses:  Dorsalis pedis pulse is intact and equal bilaterally  Sensation: intact and equal bilaterally to the distal lower extremities.  Tenderness: NTTP of the lumbar spinous process, lumbar paraspinous muscle, sacrum, SI joint, greater trochanter.    Motor:   LLE:   5/5 quad  5/5 hip flexors  5/5 gastroc  5/5 tib ant  5/5 ehl  RLE:   5/5 quads  5/5 hip flexors  5/5 gastroc  5/5 tib ant  5/5 ehl            RADIOGRAPHIC EVALUATION:  Personally reviewed    PERTINENT LABS:  Personally reviewed  Recent Labs   Lab Test 07/01/24  1800   INR 1.16*   HGB 12.8*      }      IVIS VALLADARES PA-C  Date: 7/2/2024  Time: 11:17 AM  [facility]    CC1:   Kristy Rodriguez MD    "

## 2024-07-02 NOTE — MEDICATION SCRIBE - ADMISSION MEDICATION HISTORY
"Medication Scribe Admission Medication History    Admission medication history is complete. The information provided in this note is only as accurate as the sources available at the time of the update.    Information Source(s): Patient and CareEverywhere/SureScripts via in-person    Pertinent Information: Pt reports managing their medications  Hydromorphone  -2-4mg q8h PRN for pain, per pt. Pt reports only taking 4mg if pain is very bad.  -recent fill hx 20ds 120 tabs, filled 6/14/24.  -pt prescribed Zofran ODT to take prior to Dilaudid, to prevent nausea caused by Dilaudid.  Miacalcin  -pt hasn't used recently  Ocean Lorena  -pt prefers using this over Flonase, has been using \"about three times a day as needed for congestion this past week\".  Ondansetron  -takes before taking Hydromorphone  q8h PRN  Prednisone  -takes 10mg QAMwAC on even days of the month  -takes 40mg QAMwAC on odd days of the month  Pyridostigime  -pt takes 60mg QAM wAC and a second 60mg dose roughly 4 hours later w/lunch.  Tramadol  -pt reports not working, no longer taking.  -removed from PTA list per pt.  Tizanidine  -prescribed around last Tramadol rx, pt reports not taking due to not working, left off PTA list (per pt).    Changes made to PTA medication list:  Added: Calcium Citrate, Calcium Carb, Vit D/K2 combo, Fluocinonide cream, Ocean spray, Ondansetron, Hydromorphone, Calcitonin [place]  Deleted: Calcium/Vit D combo, Tramadol  Changed: Prednisone to 10mg daily on even days. Prednisone odd days decreased to 40mg daily on ODD days (per pt).    Allergies reviewed with patient and updates made in EHR: yes    Medication History Completed By: Robin Foster 7/1/2024 9:31 PM    PTA Med List   Medication Sig Note Last Dose    amLODIPine (NORVASC) 10 MG tablet Take 10 mg by mouth every morning  7/1/2024 at AM    calcitonin, salmon, (MIACALCIN) 200 UNIT/ACT nasal spray Spray 1 spray into one nostril alternating nostrils daily Alternate nostril each " day.  Past Month at unknown    calcium carbonate (OS-SARAH) 500 MG tablet Take 1 tablet by mouth daily  7/1/2024 at AM    calcium citrate (CITRACAL) 950 (200 Ca) MG tablet Take 1 tablet by mouth daily  7/1/2024 at AM    fish oil-omega-3 fatty acids 1000 MG capsule Take 2 capsules by mouth 2 times daily  Past Month at unknown    fluocinonide (LIDEX) 0.05 % external cream Apply topically daily as needed (for psoriasis)  Unknown at PRN    fluocinonide (LIDEX) 0.05 % external solution Apply topically daily as needed (psoriasis)  Unknown at PRN    fluticasone (FLONASE) 50 mcg/actuation nasal spray Spray 2 sprays in nostril daily as needed  Past Week at PRN    HYDROmorphone (DILAUDID) 2 MG tablet Take 2-4 mg by mouth every 8 hours as needed for severe pain  7/1/2024 at 2mg AM    levothyroxine (SYNTHROID/LEVOTHROID) 112 MCG tablet Take 112 mcg by mouth daily  7/1/2024 at AM    mycophenolate (GENERIC EQUIVALENT) 500 MG tablet Take 1.5 tablets (750 mg) by mouth 2 times daily  7/1/2024 at AM    ondansetron (ZOFRAN ODT) 4 MG ODT tab Take 4 mg by mouth every 8 hours as needed for nausea (Takes before taking Hydromorphone to prevent nausea)  7/1/2024 at AM    predniSONE (DELTASONE) 10 MG tablet Take 10 mg by mouth See Admin Instructions Take 10mg every other morning with breakfast on EVEN days of the month. 7/1/2024: Even days of the month 6/30/2024 at 10 mg AM    predniSONE (DELTASONE) 20 MG tablet Take 40 mg by mouth See Admin Instructions Take 40 mg by mouth every other morning with breakfast on ODD days of the month. 7/1/2024: Odd days of the month. 7/1/2024 at 40mg AM    pyRIDostigmine (MESTINON) 60 MG tablet Take 60 mg by mouth 2 times daily (with meals) At breakfast and Lunch.  7/1/2024 at LUNCH    sodium chloride (OCEAN) 0.65 % nasal spray Spray 1 spray into both nostrils 3 times daily as needed for congestion  7/1/2024 at afternoon    Vitamin D-Vitamin K (VITAMIN K2-VITAMIN D3 PO) Take 1 tablet by mouth every morning   7/1/2024 at AM

## 2024-07-02 NOTE — CONSULTS
[facility] ACUTE INPATIENT PAIN SERVICE    [facility], [facility], [facility], [facility], [place]   PAIN consult       Assessment/Plan:  Maurice Biswas is a 69 year old male who was admitted on 7/1/2024.  I was asked by Dr. Gregory to see the patient for acute on chronic back pain. Admitted for orthopnea and dyspnea for past 3 days with concern for myasthenia gravis exacerbation. 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  shows lyrica 5/2024, tramadol 5/2024, oxycodone 5/2024, and dilaudid 6/2024. Describes pain as 6/10 and sharp pain upon movement and alleviated at rest.     PLAN:  Chronic pain secondary to vertebral fractures (has been taking dilaudid TID, and escalating opioid requirements since January). Unfortunately, he did not experience significant pain relief after the vertebroplasy in March for treatment of L1 fracture. He had subsequent L2 and L4 fractures treated with kyphoplasty on 6/4/2024 and progressively worsening back pain over the last month.  Will need to balance pain control with side effects of opioids- which certainly will impact/worsen respiratory suppression. However, if pain is poorly controlled- that may limit deep inspiration.   Multimodal Medication Therapy:   Adjuvants: Tylenol 975mg, Lidocaine - 3 patches   Opioids: Hydromorphone/Dilaudid 2mg every 8 hours as needed- aim to wean as tolerated.   Decrease dose from 2-4mg to 2 mg  Non-medication interventions- Ice, TSLO brace   Constipation Prophylaxis- Chronic diarrhea 2/2 Mestinon, PTA immodium, not taking in hospital.   Follow up /Discharge Recommendations - We recommend prescribing the following at the time of discharge: TBD.        Subjective:  Maurice was seen at the bedside today. He states that his back pain started in March at the time of the first vertebral fracture. Describes the pain  as sharp low back pain at L2 and L4 worsened with movement and moving positions. His walking is limited but he was able to walk from the bathroom to the bed and up to the recliner chair while I was in the room.       He states he has been taking dilaudid tablets at home that have been helping. We discussed the balance of taking opioid medications and how they could worsen a MG exacerbation. However, neurology states based on assessment and NIF/vital capacity that myasthenia crisis not suspected and plasmapheresis was canceled. Will try to optimize pain control with adjuvants and use dilaudid conservatively.     Of note per [place] - pt was placed on dilaudid recently (6/14) and question if  opioids are impacting his respiratory drive more so than pain itself.     06/14/2024 06/14/2024 1 Hydromorphone 2 Mg Tablet 120.00 20 Gr Cre [id number] Sup (1172) 0/0 60.00 [place]   05/30/2024 05/30/2024 1 Hydrocodone-Acetamin 5-325 Mg 20.00 7 Pa J, [id number] Sup (1172) 0/0 14.29 [place]   05/17/2024 05/17/2024 1 Tramadol Hcl 50 Mg Tablet 60.00 30 Al Duncan [id number] Sup (1172) 0/0 20.[place]   05/06/2024 05/06/2024 1 Tramadol Hcl 50 Mg Tablet 42.00 11 Le Kui [id number] Sup (1172) 0/0 38.18 [place]   05/06/2024 05/06/2024 1 Pregabalin 50 Mg Capsule 60.00 30 Le Kui [id number] Sup (1172) 0/0 0.67 [place]   05/01/2024 05/01/2024 1 Oxycodone Hcl (Ir) 5 Mg Tablet 12.00 3 Ol Ra [id number] Sup (1172) 0/0 30.00 [place]   04/22/2024 04/22/2024 1 Tramadol Hcl 50 Mg Tablet 14.00 7 Al Duncan [id number] Sup (1172) 0/0 20.[place]   04/15/2024 04/15/2024 1 Tramadol Hcl 50 Mg Tablet 14.00 7 Al Duncan [id number] Sup (1172) 0/0 20.00 [place]   03/14/2024 03/14/2024 1 Gabapentin 300 Mg Capsule 90.00 30 Al Duncan             History   Drug Use No         Tobacco Use      Smoking status: Never      Smokeless tobacco: Never        Objective:  Vital signs in last 24 hours:  B/P: 129/80, T: 97.9, P: 63, R: 16   Blood  "pressure 129/80, pulse 63, temperature 97.9  F (36.6  C), temperature source Oral, resp. rate 16, height 1.753 m (5' 9\"), weight 83.2 kg (183 lb 6.4 oz), SpO2 96%.      Review of Systems:   As per subjective, all others negative.    Physical Exam    General: in no apparent distress and non-toxic   HEENT: Head normocephalic atraumatic, oral mucosa moist. Sclerae anicteric  CV: Regular rhythm, normal rate, no murmurs  Resp: Breath pattern normal at rest. GE. No wheezes.   GI: LBM 7/2. No diarrhea.  Skin: No rashes or lesions  Extremities: BLE edema.   Psych: Normal affect, mood euthymic  Neuro: Grossly normal          Imaging:  Personally Reviewed.    Results for orders placed or performed during the hospital encounter of 07/01/24   XR Chest 2 Views    Impression    IMPRESSION: Lungs are clear. No effusions or pneumothorax. Heart size is normal. Vertebroplasty changes in the spine. Degenerative changes of the spine. No acute osseous findings.        Lab Results:  Personally Reviewed.   Last Comprehensive Metabolic Panel:  Sodium   Date Value Ref Range Status   07/01/2024 138 135 - 145 mmol/L Final     Potassium   Date Value Ref Range Status   07/01/2024 4.9 3.4 - 5.3 mmol/L Final   04/24/2018 4.5 3.5 - 5.0 mmol/L Final     Chloride   Date Value Ref Range Status   07/01/2024 101 98 - 107 mmol/L Final   04/24/2018 107 98 - 107 mmol/L Final     Carbon Dioxide (CO2)   Date Value Ref Range Status   07/01/2024 24 22 - 29 mmol/L Final   04/24/2018 24 22 - 31 mmol/L Final     Anion Gap   Date Value Ref Range Status   07/01/2024 13 7 - 15 mmol/L Final   04/24/2018 5 5 - 18 mmol/L Final     Glucose   Date Value Ref Range Status   04/24/2018 86 70 - 125 mg/dL Final     GLUCOSE BY METER POCT   Date Value Ref Range Status   07/02/2024 110 (H) 70 - 99 mg/dL Final     Urea Nitrogen   Date Value Ref Range Status   07/01/2024 24.8 (H) 8.0 - 23.0 mg/dL Final   04/24/2018 28 (H) 8 - 22 mg/dL Final     Creatinine   Date Value Ref Range " "Status   07/01/2024 1.22 (H) 0.67 - 1.17 mg/dL Final     GFR Estimate   Date Value Ref Range Status   07/01/2024 64 >60 mL/min/1.73m2 Final     Comment:     eGFR calculated using 2021 CKD-EPI equation.   04/24/2018 43 (L) >60 mL/min/1.73m2 Final     GFR, ESTIMATED POCT   Date Value Ref Range Status   10/30/2023 59 (L) >60 mL/min/1.73m2 Final     Calcium   Date Value Ref Range Status   07/01/2024 9.3 8.8 - 10.2 mg/dL Final        UA: No results found for: \"UAMP\", \"UBARB\", \"BENZODIAZEUR\", \"UCANN\", \"UCOC\", \"OPIT\", \"UPCP\"           Please see A&P for additional details of medical decision making.  MANAGEMENT DISCUSSED with the following over the past 24 hours: Patient, RN   NOTE(S)/MEDICAL RECORDS REVIEWED over the past 24 hours: Medicine, neurology, nursing, nephrology  Tests personally interpreted in the past 24 hours:  - CHEST XRAY showing no effusion or pneumothorax and no acute osseous findings.   Tests REVIEWED in the past 24 hours:  - Crt  SUPPLEMENTAL HISTORY, in addition to the patient's history, over the past 24 hours obtained from:   - RN  Medical complexity over the past 24 hours:  - Prescription DRUG MANAGEMENT performed        Pain team will sign off. Jerica Hampton served as scribe, all services provided by me.       Damaris Wolf APRN, CNS-BC, CNP  Acute Care Pain Management Program   Hours of pain coverage Mon-Fri 8-1600, afterhours please call the house officer    [place] ([place], [place], [place], [place])   Page via [employer] web console -Click for [employer]            "

## 2024-07-02 NOTE — PLAN OF CARE
"Goal Outcome Evaluation:       Patient is alert and oriented.  Denied pain at rest, though seemed to have some discomfort in back when turning to side.  Lidocaine patch applied to lower back.  Patient's vital signs are stable.  He reported some shortness of breath when lying flat at midnight, but that his breathing was better when upright.  Head of bed kept greater than 30 degrees while in bed.  Saline spray given for nasal congestion.  Patient able to ambulate independently in room.  Oriented to call light use.  Will continue to monitor.      Problem: Adult Inpatient Plan of Care  Goal: Plan of Care Review  Description: The Plan of Care Review/Shift note should be completed every shift.  The Outcome Evaluation is a brief statement about your assessment that the patient is improving, declining, or no change.  This information will be displayed automatically on your shift  note.  Outcome: Progressing  Goal: Patient-Specific Goal (Individualized)  Description: You can add care plan individualizations to a care plan. Examples of Individualization might be:  \"Parent requests to be called daily at 9am for status\", \"I have a hard time hearing out of my right ear\", or \"Do not touch me to wake me up as it startles  me\".  Outcome: Progressing  Goal: Absence of Hospital-Acquired Illness or Injury  Outcome: Progressing  Intervention: Identify and Manage Fall Risk  Recent Flowsheet Documentation  Taken 7/1/2024 0611 by Rochelle Mcdaniel, RN  Safety Promotion/Fall Prevention:   safety round/check completed   nonskid shoes/slippers when out of bed   lighting adjusted   increased rounding and observation   clutter free environment maintained  Intervention: Prevent Skin Injury  Recent Flowsheet Documentation  Taken 7/1/2024 3123 by Rochelle Mcdaniel, RN  Body Position: position changed independently  Intervention: Prevent and Manage VTE (Venous Thromboembolism) Risk  Recent Flowsheet Documentation  Taken 7/2/2024 0345 by Violeta, " MORGAN Byrd  VTE Prevention/Management: SCDs on (sequential compression devices)  Taken 7/1/2024 2358 by Rochelle Mcdaniel RN  VTE Prevention/Management: SCDs on (sequential compression devices)  Goal: Optimal Comfort and Wellbeing  Outcome: Progressing  Goal: Readiness for Transition of Care  Outcome: Progressing

## 2024-07-02 NOTE — PLAN OF CARE
Occupational Therapy Discharge Summary    Reason for therapy discharge:    Discharged home    Progress towards therapy goal(s). See goals on Care Plan in [facility] electronic health record for goal details.  Goals met.    Therapy recommendation(s):    No further OT indicated

## 2024-07-02 NOTE — PROGRESS NOTES
"Occupational Therapy     07/02/24 1310   Appointment Info   Signing Clinician's Name / Credentials (OT) No Saleh OTR/L   Rehab Comments (OT) per report, waiting on evaluation in order to DC; pt agreeable.   Quick Adds   Quick Adds Certification   Living Environment   People in Home spouse   Current Living Arrangements house   Home Accessibility stairs to enter home;stairs within home   Number of Stairs, Main Entrance 2   Stair Railings, Main Entrance none   Number of Stairs, Within Home, Primary greater than 10 stairs   Stair Railings, Within Home, Primary railing on right side (ascending)   Living Environment Comments tub/shower combo   Self-Care   Usual Activity Tolerance good   Current Activity Tolerance moderate   Equipment Currently Used at Home cane, straight   Fall history within last six months no   Activity/Exercise/Self-Care Comment pt reports recently occ A for ADLs as needed-LB drsg; recently purchased a cane for ambulation.   General Information   Onset of Illness/Injury or Date of Surgery 07/01/24   Referring Physician Kristy Rodriguez MD   Patient/Family Therapy Goal Statement (OT) none stated   Existing Precautions/Restrictions   (pt wearing brace when OOB (per ortho note \"continue bracing\"))   General Observations and Info Per chart:  \"69 year old male admitted on 7/1/2024. He came to the ED for evaluation of orthopnea, dyspnea on exertion x 3 days concerning for MG exacerbation\"   Cognitive Status Examination   Affect/Mental Status (Cognitive) WNL   Follows Commands WNL   Sensory   Sensory Quick Adds UE sensation intact   Pain Assessment   Patient Currently in Pain   (back pain-pt reports hasn't improved since recent kyphoplasty)   Posture   Posture not impaired   Range of Motion Comprehensive   Comment, General Range of Motion demo functional UE ROM as demo through functional tasks   Strength Comprehensive (MMT)   Comment, General Manual Muscle Testing (MMT) Assessment demo funcitonal UE " strength as demo through ADLs   Bed Mobility   Comment (Bed Mobility) pt has been sleeping in recliner sofa in basement-only goes downstairs to sleep, otherwise remains on main level.   Transfers   Transfer Comments SBA   Balance   Balance Comments SBA w/o AD in room; pt wearing back brace   Activities of Daily Living   BADL Assessment/Intervention lower body dressing   Lower Body Dressing Assessment/Training   Comment, (Lower Body Dressing) SBA slip on shoes-observed edema in feet-pt reports current shoes fit; hasn't been able to get socks on.   Clinical Impression   Criteria for Skilled Therapeutic Interventions Met (OT) Yes, treatment indicated   OT Diagnosis decreased ADL activity tolerance   OT Problem List-Impairments impacting ADL activity tolerance impaired;mobility   Assessment of Occupational Performance 1-3 Performance Deficits   Identified Performance Deficits decreased activity tolerance   Planned Therapy Interventions (OT) progressive activity/exercise;home program guidelines   Clinical Decision Making Complexity (OT) problem focused assessment/low complexity   Risk & Benefits of therapy have been explained evaluation/treatment results reviewed;participants voiced agreement with care plan;patient   OT Total Evaluation Time   OT Eval, Low Complexity Minutes (58081) 24   Therapy Certification   Medical Diagnosis hypothyroidism   Start of Care Date 07/02/24   Certification date from 07/02/24   Certification date to 07/02/24   OT Goals   Therapy Frequency (OT) One time eval and treatment   OT Predicted Duration/Target Date for Goal Attainment 07/02/24   OT Goals Aerobic Activity;OT Goal 1   OT: Perform aerobic activity with stable cardiovascular response intermittent activity;10 minutes   OT: Goal 1 Pt to be able to verbalize 3 mvmts to avoid for body mech during ADLs to minimize pain & promote healing .   Self-Care/Home Management   Self-Care/Home Mgmt/ADL, Compensatory, Meal Prep Minutes (80397) 12    Symptoms Noted During/After Treatment (Meal Preparation/Planning Training) none   Treatment Detail/Skilled Intervention Additional functional mobility w/SBA (pt defers the need for his cane for longer distances this session) ~150 ft. Pt w/slight limping-edema in feet; instructed/reinforced to elevate. No overt unsteadiness/no LOB. Pt visibly slightly SOB, denies feeling SOB-O2 remained > 95% during session on RA; HR 80's. Instructed on body mech & possible LH AE that could be beneficial. Pt has a reacher & borrowed a sock aid (stated his feet are too swollen to use so his spouse has been assisting) Instructed on car trf tech to minimize back pain. Briefly educ on EC/WS. Pt reports SOB much improved & denies any further questions or concerns for DC home.   OT Discharge Planning   OT Plan DC home   OT Discharge Recommendation (DC Rec) home with assist   OT Rationale for DC Rec Pt demo SBA w/ADLs, trfs & ambulation. Pt reports his spouse can A as needed once home.   OT Brief overview of current status SBA/mod I trfs, ambulation   Total Session Time   Timed Code Treatment Minutes 10   Total Session Time (sum of timed and untimed services) 20    [facility]  OUTPATIENT OCCUPATIONAL THERAPY  EVALUATION  PLAN OF TREATMENT FOR OUTPATIENT REHABILITATION  (COMPLETE FOR INITIAL CLAIMS ONLY)  Patient's Last Name, First Name, M.I.  YOB: 1955  ZulayMaurice  GILBERT                          Provider's Name  [facility] Medical Record No.  [id number]                             Onset Date:  07/01/24   Start of Care Date:  07/02/24   Type:     ___PT   _X_OT   ___SLP Medical Diagnosis:  hypothyroidism                    OT Diagnosis:  decreased ADL activity tolerance Visits from SOC:  1     See note for plan of treatment, functional goals and certification details    I CERTIFY THE NEED FOR THESE SERVICES FURNISHED UNDER        THIS PLAN OF TREATMENT AND WHILE  UNDER MY CARE     (Physician co-signature of this document indicates review and certification of the therapy plan).

## 2024-07-02 NOTE — DISCHARGE SUMMARY
"[facility]  Hospitalist Discharge Summary      Date of Admission:  7/1/2024  Date of Discharge:  7/2/2024  Discharging Provider: Krisyt Rodriguez MD  Discharge Service: Hospitalist Service    Discharge Diagnoses   Active Problems:    Acquired hypothyroidism    Myasthenia gravis (H)    Stage 3a chronic kidney disease (H)    Primary hypertension    Steroid-induced hyperglycemia    Acute midline low back pain without sciatica    Back pain      Clinically Significant Risk Factors     # Overweight: Estimated body mass index is 27.08 kg/m  as calculated from the following:    Height as of this encounter: 1.753 m (5' 9\").    Weight as of this encounter: 83.2 kg (183 lb 6.4 oz).       Follow-ups Needed After Discharge   Follow-up Appointments     Follow Up Care      Please follow-up with Dr. Alec Bush's team in 2 weeks at [facility]. Call our scheduling line at [phone] to make an   appointment, if you do not already have one scheduled.        Follow-up and recommended labs and tests       Follow up with primary care provider, Esa Valdivia, within 3-5days, to   evaluate medication change and for hospital follow- up. The following   labs/tests are recommended: BMP and CBC    Neurology to arrange outpatient follow up for steroid taper            Unresulted Labs Ordered in the Past 30 Days of this Admission       No orders found for last 31 day(s).        These results will be followed up by     Discharge Disposition   Discharged to home  Condition at discharge: Stable    Hospital Course   Maurice Biswas is a 69 year old male admitted on 7/1/2024. He came to the ED for evaluation of orthopnea, dyspnea on exertion x 3 days concerning for MG exacerbation but discussed with Neurology and unlikely crisis and symptoms secondary to compression fractures and pain      #Myasthenia gravis not in exacerbation  - NIF -60  - discussed with neurology and increased Mycophenolate and " pyridostigmine  - outpatient follow up      #Acute on chronic back pain  #Chronic compression fractures  -History of vertebroplasty of L1 on 3/28/2024; L2 and L4 on 6/4/2024  - still on prednisone and per patient neurologist weaning off slowly.  - lidocaine patch  -Orthopedic and pain management consults  -Continue PTA calcitonin, calcium carbonate, calcium citrate, hydromorphone     #Essential hypertension  -Continue PTA amlodipine     #Hypothyroidism  -Continue PTA levothyroxine      #Chronic kidney disease stage II/IIIa  #IgA nephropathy  -Stable renal function     #Anemia of chronic disease  -Stable hemoglobin     #Coagulation defect  -INR 1.16     #Lower extremity edema  -Likely fluid retention secondary to prednisone         Consultations This Hospital Stay   NEUROLOGY IP CONSULT  NEPHROLOGY IP CONSULT  ORTHOPEDIC SURGERY IP CONSULT  PAIN MANAGEMENT ADULT IP CONSULT  CARE MANAGEMENT / SOCIAL WORK IP CONSULT  OCCUPATIONAL THERAPY ADULT IP CONSULT  PHYSICAL THERAPY ADULT IP CONSULT    Code Status   Full Code    Time Spent on this Encounter   I, Kristy Rodriguez MD, personally saw the patient today and spent less than or equal to 30 minutes discharging this patient.       Kristy Rodriguez MD  [place] [id number]  Phone: [phone]  Fax: [phone]  ______________________________________________________________________    Physical Exam   Vital Signs: Temp: 97.9  F (36.6  C) Temp src: Oral BP: 129/80 Pulse: 63   Resp: 16 SpO2: 96 % O2 Device: None (Room air)    Weight: 183 lbs 6.4 oz  Physical Exam  Vitals and nursing note reviewed.   Constitutional:       Appearance: Normal appearance.   Cardiovascular:      Rate and Rhythm: Normal rate and regular rhythm.      Pulses: Normal pulses.   Pulmonary:      Effort: Pulmonary effort is normal.      Breath sounds: Normal breath sounds.      Comments: Winces with deep inspiration, unlabored  breathing   Abdominal:      General: Bowel sounds are normal.      Palpations: Abdomen is soft.   Musculoskeletal:         General: Normal range of motion.   Skin:     General: Skin is warm and dry.      Capillary Refill: Capillary refill takes less than 2 seconds.   Neurological:      Mental Status: He is alert and oriented to person, place, and time. Mental status is at baseline.              Primary Care Physician   Esa Valdivia    Discharge Orders      Follow Up Care    Please follow-up with Dr. Alec Bush's team in 2 weeks at [facility]. Call our scheduling line at [phone] to make an appointment, if you do not already have one scheduled.     Reason for your hospital stay    Active Problems:    Acquired hypothyroidism    Myasthenia gravis (H)    Stage 3a chronic kidney disease (H)    Primary hypertension    Steroid-induced hyperglycemia    Acute midline low back pain without sciatica    Back pain     Follow-up and recommended labs and tests     Follow up with primary care provider, Esa Valdivia, within 3-5days, to evaluate medication change and for hospital follow- up. The following labs/tests are recommended: BMP and CBC    Neurology to arrange outpatient follow up for steroid taper     Activity    Your activity upon discharge: activity as tolerated continue restrictions as previous. And wear TLSO brace as instructed by ortho     Incentive Spirometry    Continue to use incentive spirometer 8 times a day Until deep breathing is completely pain free without the need to splint     Diet    Follow this diet upon discharge: Orders Placed This Encounter      Combination Diet Moderate Consistent Carb (60 g CHO per Meal) Diet; Low Saturated Fat Na <2400mg Diet       Significant Results and Procedures   Most Recent 3 CBC's:  Recent Labs   Lab Test 07/01/24  1800 06/10/24  1902 05/01/24  0831   WBC 9.6 12.8* 8.5   HGB 12.8* 12.8* 11.5*   MCV 91 91 92    208 155     Most Recent 3 BMP's:  Recent Labs    Lab Test 07/02/24  1208 07/02/24  0927 07/02/24  0002 07/01/24  1800 06/10/24  1902 05/01/24  1100 05/01/24  0831   NA  --   --   --  138 141  --  142   POTASSIUM  --   --   --  4.9 4.3  --  3.9   CHLORIDE  --   --   --  101 106  --  106   CO2  --   --   --  24 22  --  26   BUN  --   --   --  24.8* 25.7*  --  34.8*   CR  --   --   --  1.22* 1.26*  --  1.44*   ANIONGAP  --   --   --  13 13  --  10   SARAH  --   --   --  9.3 9.2  --  9.0   * 109* 110* 128* 118*   < > 80    < > = values in this interval not displayed.     Most Recent 2 LFT's:  Recent Labs   Lab Test 06/10/24  1902 05/01/24  0831   AST 13 14   ALT 27 28   ALKPHOS 93 62   BILITOTAL 0.7 0.6   ,   Results for orders placed or performed during the hospital encounter of 07/01/24   XR Chest 2 Views    Narrative    EXAM: XR CHEST 2 VIEWS  LOCATION: [facility]  DATE: 7/1/2024    INDICATION: Shortness of breath  COMPARISON: Chest CT from 10/23/2023      Impression    IMPRESSION: Lungs are clear. No effusions or pneumothorax. Heart size is normal. Vertebroplasty changes in the spine. Degenerative changes of the spine. No acute osseous findings.       Discharge Medications   Current Discharge Medication List        START taking these medications    Details   acetaminophen (TYLENOL) 325 MG tablet Take 3 tablets (975 mg) by mouth every 6 hours for 4 days  Qty: 48 tablet, Refills: 0    Associated Diagnoses: Acute midline low back pain without sciatica      Lidocaine (LIDOCARE) 4 % Patch Place 3 patches onto the skin every 24 hours To prevent lidocaine toxicity, patient should be patch free for 12 hrs daily.  Qty: 6 patch, Refills: 1    Associated Diagnoses: Acute midline low back pain without sciatica           CONTINUE these medications which have CHANGED    Details   HYDROmorphone (DILAUDID) 2 MG tablet Take 1 tablet (2 mg) by mouth every 8 hours as needed for severe pain    Comments: Use prescription at home with decreased  dose  Associated Diagnoses: Acute midline low back pain without sciatica      mycophenolate (GENERIC EQUIVALENT) 500 MG tablet Take 2 tablets (1,000 mg) by mouth 2 times daily for 30 days  Qty: 120 tablet, Refills: 0    Associated Diagnoses: Myasthenia gravis (H)      pyRIDostigmine (MESTINON) 60 MG tablet Take 1 tablet (60 mg) by mouth every 8 hours for 30 days  Qty: 90 tablet, Refills: 0    Associated Diagnoses: Myasthenia gravis (H)           CONTINUE these medications which have NOT CHANGED    Details   amLODIPine (NORVASC) 10 MG tablet Take 10 mg by mouth every morning      calcitonin, salmon, (MIACALCIN) 200 UNIT/ACT nasal spray Spray 1 spray into one nostril alternating nostrils daily Alternate nostril each day.      calcium carbonate (OS-SARAH) 500 MG tablet Take 1 tablet by mouth daily      calcium citrate (CITRACAL) 950 (200 Ca) MG tablet Take 1 tablet by mouth daily      fish oil-omega-3 fatty acids 1000 MG capsule Take 2 capsules by mouth 2 times daily      fluocinonide (LIDEX) 0.05 % external cream Apply topically daily as needed (for psoriasis)      fluocinonide (LIDEX) 0.05 % external solution Apply topically daily as needed (psoriasis)      fluticasone (FLONASE) 50 mcg/actuation nasal spray Spray 2 sprays in nostril daily as needed      levothyroxine (SYNTHROID/LEVOTHROID) 112 MCG tablet Take 112 mcg by mouth daily      ondansetron (ZOFRAN ODT) 4 MG ODT tab Take 4 mg by mouth every 8 hours as needed for nausea (Takes before taking Hydromorphone to prevent nausea)      !! predniSONE (DELTASONE) 10 MG tablet Take 10 mg by mouth See Admin Instructions Take 10mg every other morning with breakfast on EVEN days of the month.      !! predniSONE (DELTASONE) 20 MG tablet Take 40 mg by mouth See Admin Instructions Take 40 mg by mouth every other morning with breakfast on ODD days of the month.      sodium chloride (OCEAN) 0.65 % nasal spray Spray 1 spray into both nostrils 3 times daily as needed for congestion       Vitamin D-Vitamin K (VITAMIN K2-VITAMIN D3 PO) Take 1 tablet by mouth every morning       !! - Potential duplicate medications found. Please discuss with provider.        Allergies   Allergies   Allergen Reactions    Doxycycline Muscle Pain (Myalgia) and Other (See Comments)     Brought on Myasthenia Gravis

## 2024-07-02 NOTE — PROGRESS NOTES
I was contacted by the ER to consult on this patient as neurology is requesting plasma exchange for MG recalcitrant to medication alone.     Full consult to come in the AM. Call with questions.

## 2024-07-02 NOTE — PLAN OF CARE
"  Patient is A/O, reported 3/10 back pain that is chronic.  Managed by tylenol and lidocaine patches.  Patient is at baseline, and ambulating IND.  Neurology met with patient, it is determined plasmapheresis is not necessary.  His previous CC was related to his spinal fracture and not Myasthenia Gravis.  Discharge order is in.  Patient to transport home with family.    Tele: Med Surg    PRIMARY DIAGNOSIS: \"GENERIC\" NURSING  OUTPATIENT/OBSERVATION GOALS TO BE MET BEFORE DISCHARGE:  ADLs back to baseline: Yes    Activity and level of assistance: Ambulating independently.    Pain status: chronic back pain, it is managing in his acceptance window    Return to near baseline physical activity: Yes     Discharge Planner Nurse   Safe discharge environment identified: Yes  Barriers to discharge: No       Entered by: Joce Sierra RN 07/02/2024 1:14 PM     Please review provider order for any additional goals.   Nurse to notify provider when observation goals have been met and patient is ready for discharge.    Problem: Adult Inpatient Plan of Care  Goal: Plan of Care Review  Description: The Plan of Care Review/Shift note should be completed every shift.  The Outcome Evaluation is a brief statement about your assessment that the patient is improving, declining, or no change.  This information will be displayed automatically on your shift  note.  Outcome: Met  Goal: Patient-Specific Goal (Individualized)  Description: You can add care plan individualizations to a care plan. Examples of Individualization might be:  \"Parent requests to be called daily at 9am for status\", \"I have a hard time hearing out of my right ear\", or \"Do not touch me to wake me up as it startles  me\".  Outcome: Met  Goal: Absence of Hospital-Acquired Illness or Injury  Outcome: Met  Intervention: Identify and Manage Fall Risk  Recent Flowsheet Documentation  Taken 7/2/2024 0900 by Joce Sierra, RN  Safety Promotion/Fall Prevention:   activity " supervised   clutter free environment maintained   increased rounding and observation   lighting adjusted   mobility aid in reach   nonskid shoes/slippers when out of bed   patient and family education   room door open   room near nurse's station   room organization consistent   safety round/check completed  Intervention: Prevent and Manage VTE (Venous Thromboembolism) Risk  Recent Flowsheet Documentation  Taken 7/2/2024 0900 by Joce Sierra RN  VTE Prevention/Management:   SCDs off (sequential compression devices)   compression stockings off  Intervention: Prevent Infection  Recent Flowsheet Documentation  Taken 7/2/2024 0900 by Joce Sierra RN  Infection Prevention: hand hygiene promoted  Goal: Optimal Comfort and Wellbeing  Outcome: Met  Intervention: Monitor Pain and Promote Comfort  Recent Flowsheet Documentation  Taken 7/2/2024 0900 by Joce Sierra RN  Pain Management Interventions:   medication (see MAR)   emotional support  Goal: Readiness for Transition of Care  Outcome: Met     Problem: Risk for Delirium  Goal: Optimal Coping  Outcome: Met  Goal: Improved Behavioral Control  Outcome: Met  Intervention: Minimize Safety Risk  Recent Flowsheet Documentation  Taken 7/2/2024 0900 by Joce Sierra RN  Communication Enhancement Strategies: call light answered in person  Enhanced Safety Measures: (patient is a/o, ind. steady knows to call for help when needed) other (see comments)  Goal: Improved Attention and Thought Clarity  Outcome: Met  Intervention: Maximize Cognitive Function  Recent Flowsheet Documentation  Taken 7/2/2024 0900 by Joce Sierra RN  Sensory Stimulation Regulation: care clustered  Reorientation Measures: clock in view  Goal: Improved Sleep  Outcome: Met  Berlin Sierra RN

## 2024-07-02 NOTE — CONSULTS
Care Management Initial Consult    General Information  Assessment completed with: Patient, Patient  Type of CM/SW Visit: Initial Assessment    Primary Care Provider verified and updated as needed: Yes   Readmission within the last 30 days: no previous admission in last 30 days      Reason for Consult: discharge planning  Advance Care Planning: Advance Care Planning Reviewed: verified with patient        Communication Assessment  Patient's communication style: spoken language (English or Bilingual)    Hearing Difficulty or Deaf: no   Wear Glasses or Blind: yes    Cognitive  Cognitive/Neuro/Behavioral: WDL  Level of Consciousness: alert  Arousal Level: opens eyes spontaneously  Orientation: oriented x 4  Mood/Behavior: calm, cooperative, behavior appropriate to situation  Best Language: 0 - No aphasia  Speech: clear, spontaneous, logical    Living Environment:   People in home: spouse  Naomi  Current living Arrangements: house      Able to return to prior arrangements: yes    Family/Social Support:  Care provided by: self  Provides care for: no one  Marital Status:   Wife  Naomi       Description of Support System: Supportive    Support Assessment: Adequate family and caregiver support    Current Resources:   Patient receiving home care services: No     Community Resources: None  Equipment currently used at home: cane, straight  Supplies currently used at home: None    Employment/Financial:  Employment Status: retired        Financial Concerns: none   Referral to Financial Worker: No     Does the patient's insurance plan have a 3 day qualifying hospital stay waiver?  No    Lifestyle & Psychosocial Needs:  Social Determinants of Health     Food Insecurity: Not on file   Depression: Not at risk (2/29/2024)    [facility]    PHQ-2     PHQ-2 TOTAL SCORE: 1   Housing Stability: Not on file   Tobacco Use: Low Risk   (7/1/2024)    Patient History     Smoking Tobacco Use: Never     Smokeless Tobacco Use: Never     Passive Exposure: Not on file   Recent Concern: Tobacco Use - Medium Risk (6/7/2024)    Received from [facility]    Patient History     Smoking Tobacco Use: Former     Smokeless Tobacco Use: Never     Passive Exposure: Not on file   Financial Resource Strain: High Risk (1/1/2022)    Received from [facility], [facility]    Financial Resource Strain     Difficulty of Paying Living Expenses: Not on file     Difficulty of Paying Living Expenses: Not on file   Alcohol Use: Not on file   Transportation Needs: Not on file   Physical Activity: Not on file   Interpersonal Safety: Not on file   Stress: Not on file   Social Connections: Unknown (1/1/2022)    Received from [facility], [facility]    Social Connections     Frequency of Communication with Friends and Family: Not on file   Health Literacy: Not on file     Functional Status:  Prior to admission patient needed assistance:   Dependent ADLs:: Independent, Ambulation-cane  Dependent IADLs:: Independent    Additional Information:  Writer met with patient at bedside to review role of care management services, discuss goals of care and assess need for any possible services at discharge. Patient alert, answering questions appropriately and engaged in the conversation. Patient reported no HCD. Lives with spouse in a house. Report independent with ADLs/IADLs. Has cane and body brace. No community resources. Goal is to return home. Anticipate family will transport.       Yolande Dominguez RN

## 2024-07-02 NOTE — CONSULTS
NEUROLOGY INPATIENT CONSULTATION NOTE       [facility]  [address] [place] [address]  Tel: [phone]  Fax: [phone]  [email or web address]     Maurice Biswas,  1955, MRN [id number]  PCP: Esa Valdivia  Date: 2024     ASSESSMENT & PLAN     Diagnosis code: Myasthenia gravis    R/O myasthenia crisis  69-year-old male with history of CKD stage IIIa, HTN, IgA nephropathy, prostate cancer, myasthenia gravis admitted with progressive dyspnea on exertion for the last 3 days.  He had myasthenia crisis last year that required plasma exchange  Increase CellCept to 1000 mg twice daily  Currently patient is on prednisone 10 mg alternating with 40 mg every other day  Currently is on Mestinon twice a day and in the past had some issues with diarrhea but I would recommend increasing it to 60 mg 3 times daily  Weaning parameters (NIF, vital capacity)  If vital capacity and NIF are low, will consult nephrology for plasma exchange every other day x 3.  Although I am not convinced patient has myasthenic crisis.  I suspect his breathing issues are due to his recent compression fracture requiring kyphoplasty    Thank you again for this referral, please feel free to contact me if you have any questions.    Murali Hyatt MD  [facility]     CHIEF COMPLAINT Myasthenia gravis with (acute) exacerbation (H)     HISTORY OF PRESENT ILLNESS     We have been requested by Dr. Rodriguez to evaluate Maurice Biswas who is a 69 year old  male for myasthenia gravis    Patient is 69-year-old male with history of CKD stage IIIa, HTN, IgA nephropathy, prostate cancer and myasthenia gravis who was brought to the emergency room with progressive dyspnea on exertion for the last 3 days.  He was recently diagnosed with L1 compression fracture and required kyphoplasty.  His back pain required increased amount of tramadol, gabapentin and Lyrica in addition to  "hydromorphone and Zofran.  Progressively in the last 3 days he has noticed some shortness of breath especially when he takes a deep breath.  He denies any ptosis, diplopia, dysarthria, difficulty managing his secretions or any increased weakness.  He is interested in getting off of prednisone as he fears his vertebral issues are result of long-term use of steroids.    Patient has history of myasthenia gravis that was diagnosed in August 2021 and initially had ocular findings.  He was initially treated with Mestinon and in August 2023 immunosuppressive drugs were added.  He had a flareup in August 2023 that required plasma exchange.  Currently he is on CellCept 750 mg twice daily, prednisone 50 mg every other day and Mestinon 60 mg 2 times daily     PROBLEM LIST      Patient Active Problem List   Diagnosis    Septic bursitis    NILA (acute kidney injury) (H24)    Anemia, unspecified type    Acquired hypothyroidism    Sinus bradycardia    Abnormal LFTs    Myasthenia gravis (H)    Stage 3a chronic kidney disease (H)    Primary hypertension    Steroid-induced hyperglycemia    Pain of finger of left hand    Suppurative tenosynovitis of flexor tendon    Pyogenic arthritis of right hand (H)    Acute midline low back pain without sciatica    Back pain    Myasthenia gravis with (acute) exacerbation (H)    IgA nephropathy    Prostate cancer (H)    Psoriatic arthritis (H)      Clinically Significant Risk Factors Present on Admission                 # Coagulation Defect: INR = 1.16 (Ref range: 0.85 - 1.15) and/or PTT = 23 Seconds (Ref range: 22 - 38 Seconds), will monitor for bleeding        # Hypertension: Noted on problem list        # Overweight: Estimated body mass index is 27.08 kg/m  as calculated from the following:    Height as of this encounter: 1.753 m (5' 9\").    Weight as of this encounter: 83.2 kg (183 lb 6.4 oz).              PAST MEDICAL & SURGICAL HISTORY     Past Medical History: Patient  has no past medical " history on file.    Past Surgical History: He  has a past surgical history that includes IR Renal Biopsy Left (8/20/2018); IR CVC Non Tunnel Placement > 5 Yrs (8/31/2023); and PICC/Midline Placement (1/24/2024).     SOCIAL HISTORY     Reviewed, and he  reports that he has never smoked. He has never used smokeless tobacco. He reports that he does not drink alcohol and does not use drugs.     FAMILY HISTORY     Reviewed, and family history is not on file.     ALLERGIES     Allergies   Allergen Reactions    Doxycycline Muscle Pain (Myalgia) and Other (See Comments)     Brought on Myasthenia Gravis        REVIEW OF SYSTEMS     Pertinent items are noted in HPI.     HOME & HOSPITAL MEDICATIONS     Prior to Admission Medications  Medications Prior to Admission   Medication Sig Dispense Refill Last Dose    amLODIPine (NORVASC) 10 MG tablet Take 10 mg by mouth every morning   7/1/2024 at AM    calcitonin, salmon, (MIACALCIN) 200 UNIT/ACT nasal spray Spray 1 spray into one nostril alternating nostrils daily Alternate nostril each day.   Past Month at unknown    calcium carbonate (OS-SARAH) 500 MG tablet Take 1 tablet by mouth daily   7/1/2024 at AM    calcium citrate (CITRACAL) 950 (200 Ca) MG tablet Take 1 tablet by mouth daily   7/1/2024 at AM    fish oil-omega-3 fatty acids 1000 MG capsule Take 2 capsules by mouth 2 times daily   Past Month at unknown    fluocinonide (LIDEX) 0.05 % external cream Apply topically daily as needed (for psoriasis)   Unknown at PRN    fluocinonide (LIDEX) 0.05 % external solution Apply topically daily as needed (psoriasis)   Unknown at PRN    fluticasone (FLONASE) 50 mcg/actuation nasal spray Spray 2 sprays in nostril daily as needed   Past Week at PRN    HYDROmorphone (DILAUDID) 2 MG tablet Take 2-4 mg by mouth every 8 hours as needed for severe pain   7/1/2024 at 2mg AM    levothyroxine (SYNTHROID/LEVOTHROID) 112 MCG tablet Take 112 mcg by mouth daily   7/1/2024 at AM    mycophenolate (GENERIC  EQUIVALENT) 500 MG tablet Take 1.5 tablets (750 mg) by mouth 2 times daily 90 tablet 11 7/1/2024 at AM    ondansetron (ZOFRAN ODT) 4 MG ODT tab Take 4 mg by mouth every 8 hours as needed for nausea (Takes before taking Hydromorphone to prevent nausea)   7/1/2024 at AM    predniSONE (DELTASONE) 10 MG tablet Take 10 mg by mouth See Admin Instructions Take 10mg every other morning with breakfast on EVEN days of the month.   6/30/2024 at 10 mg AM    predniSONE (DELTASONE) 20 MG tablet Take 40 mg by mouth See Admin Instructions Take 40 mg by mouth every other morning with breakfast on ODD days of the month.   7/1/2024 at 40mg AM    pyRIDostigmine (MESTINON) 60 MG tablet Take 60 mg by mouth 2 times daily (with meals) At breakfast and Lunch.   7/1/2024 at LUNCH    sodium chloride (OCEAN) 0.65 % nasal spray Spray 1 spray into both nostrils 3 times daily as needed for congestion   7/1/2024 at afternoon    Vitamin D-Vitamin K (VITAMIN K2-VITAMIN D3 PO) Take 1 tablet by mouth every morning   7/1/2024 at AM       Hospital Medications  Current Facility-Administered Medications   Medication Dose Route Frequency Provider Last Rate Last Admin    acetaminophen (TYLENOL) tablet 975 mg  975 mg Oral Q6H Damaris Wolf APRN CNP        amLODIPine (NORVASC) tablet 10 mg  10 mg Oral QAM Asher Gregory MD        calcitonin (salmon) (MIACALCIN) nasal spray 1 spray  1 spray Alternating Nostrils Daily Asher Gregory MD        calcium carbonate 500 mg (elemental) (OSCAL) tablet 500 mg  500 mg Oral Daily Asher Gregory MD        calcium citrate (CITRACAL) tablet 950 mg  950 mg Oral Daily Asher Gregory MD        levothyroxine (SYNTHROID/LEVOTHROID) tablet 112 mcg  112 mcg Oral Daily Asher Gregory MD        Lidocaine (LIDOCARE) 4 % Patch 3 patch  3 patch Transdermal Q24H Damaris Wolf APRN CNP        mycophenolate (GENERIC EQUIVALENT) capsule 1,000 mg  1,000 mg Oral BID IS Murali Hyatt MD         predniSONE (DELTASONE) tablet 10 mg  10 mg Oral Every Other Day Asher Gregory MD        [START ON 7/3/2024] predniSONE (DELTASONE) tablet 40 mg  40 mg Oral Every Other Day Asher Gregory MD        pyRIDostigmine (MESTINON) tablet 60 mg  60 mg Oral Q8H Murali He MD        sodium chloride (PF) 0.9% PF flush 3 mL  3 mL Intracatheter Q8H Asher Gregory MD   3 mL at 07/02/24 0015        PHYSICAL EXAM     Vital signs  Temp:  [97.8  F (36.6  C)-98.8  F (37.1  C)] 97.9  F (36.6  C)  Pulse:  [63-87] 63  Resp:  [12-16] 16  BP: (127-151)/(67-94) 129/80  SpO2:  [96 %-99 %] 96 %    General Physical Exam: Patient is alert and oriented x 3. Vital signs were reviewed and are documented in EMR. Neck was supple, no carotid bruit, thyromegaly, JVD or lymphadenopathy noted.  Neurological Exam:  Patient is alert and oriented x 3 no acute distress.  Speech normal with no dysarthria or aphasia.  Cranial nerves II through XII intact no ptosis.  Palate remains symmetrically.  Strength in extremities 5/5 reflexes 1+ toes downgoing.  No dysmetria noted on finger-nose testing.  Gait normal     DIAGNOSTIC STUDIES     Pertinent Radiology   Radiology Results: Reviewed impression and images     CXR  Lungs are clear. No effusions or pneumothorax. Heart size is normal. Vertebroplasty changes in the spine. Degenerative changes of the spine. No acute osseous findings.     Pertinent Labs   Lab Results: Personally Reviewed   Recent Results (from the past 24 hour(s))   Extra Red Top Tube    Collection Time: 07/01/24  5:57 PM   Result Value Ref Range    Hold Specimen JIC    Troponin T, High Sensitivity    Collection Time: 07/01/24  6:00 PM   Result Value Ref Range    Troponin T, High Sensitivity 17 <=22 ng/L   Extra Blue Top Tube    Collection Time: 07/01/24  6:00 PM   Result Value Ref Range    Hold Specimen JIC    Extra Green Top (Lithium Heparin) Tube    Collection Time: 07/01/24  6:00 PM   Result Value Ref Range    Hold  Specimen [facility]    Extra Purple Top Tube    Collection Time: 07/01/24  6:00 PM   Result Value Ref Range    Hold Specimen JI    Basic metabolic panel    Collection Time: 07/01/24  6:00 PM   Result Value Ref Range    Sodium 138 135 - 145 mmol/L    Potassium 4.9 3.4 - 5.3 mmol/L    Chloride 101 98 - 107 mmol/L    Carbon Dioxide (CO2) 24 22 - 29 mmol/L    Anion Gap 13 7 - 15 mmol/L    Urea Nitrogen 24.8 (H) 8.0 - 23.0 mg/dL    Creatinine 1.22 (H) 0.67 - 1.17 mg/dL    GFR Estimate 64 >60 mL/min/1.73m2    Calcium 9.3 8.8 - 10.2 mg/dL    Glucose 128 (H) 70 - 99 mg/dL   Nt probnp inpatient (BNP)    Collection Time: 07/01/24  6:00 PM   Result Value Ref Range    N terminal Pro BNP Inpatient 193 0 - 900 pg/mL   D dimer quantitative    Collection Time: 07/01/24  6:00 PM   Result Value Ref Range    D-Dimer Quantitative 0.67 (H) 0.00 - 0.50 ug/mL FEU   INR    Collection Time: 07/01/24  6:00 PM   Result Value Ref Range    INR 1.16 (H) 0.85 - 1.15   Partial thromboplastin time    Collection Time: 07/01/24  6:00 PM   Result Value Ref Range    aPTT 23 22 - 38 Seconds   CBC with platelets and differential    Collection Time: 07/01/24  6:00 PM   Result Value Ref Range    WBC Count 9.6 4.0 - 11.0 10e3/uL    RBC Count 4.33 (L) 4.40 - 5.90 10e6/uL    Hemoglobin 12.8 (L) 13.3 - 17.7 g/dL    Hematocrit 39.4 (L) 40.0 - 53.0 %    MCV 91 78 - 100 fL    MCH 29.6 26.5 - 33.0 pg    MCHC 32.5 31.5 - 36.5 g/dL    RDW 13.2 10.0 - 15.0 %    Platelet Count 201 150 - 450 10e3/uL    % Neutrophils 92 %    % Lymphocytes 5 %    % Monocytes 3 %    % Eosinophils 0 %    % Basophils 0 %    % Immature Granulocytes 1 %    NRBCs per 100 WBC 0 <1 /100    Absolute Neutrophils 8.8 (H) 1.6 - 8.3 10e3/uL    Absolute Lymphocytes 0.5 (L) 0.8 - 5.3 10e3/uL    Absolute Monocytes 0.3 0.0 - 1.3 10e3/uL    Absolute Eosinophils 0.0 0.0 - 0.7 10e3/uL    Absolute Basophils 0.0 0.0 - 0.2 10e3/uL    Absolute Immature Granulocytes 0.1 <=0.4 10e3/uL    Absolute NRBCs 0.0 10e3/uL    Symptomatic Influenza A/B, RSV, & SARS-CoV2 PCR (COVID-19) Nasopharyngeal    Collection Time: 07/01/24  6:11 PM    Specimen: Nasopharyngeal; Swab   Result Value Ref Range    Influenza A PCR Negative Negative    Influenza B PCR Negative Negative    RSV PCR Negative Negative    SARS CoV2 PCR Negative Negative   Troponin T, High Sensitivity    Collection Time: 07/01/24  8:32 PM   Result Value Ref Range    Troponin T, High Sensitivity 16 <=22 ng/L   Glucose by meter    Collection Time: 07/02/24 12:02 AM   Result Value Ref Range    GLUCOSE BY METER POCT 110 (H) 70 - 99 mg/dL       Total time spent for face to face visit, reviewing labs/imaging studies, counseling and coordination of care was: 1 Hour 30 Minutes More than 50% of this time was spent on counseling and coordination of care.    This note was dictated using voice recognition software.  Any grammatical or context distortions are unintentional and inherent to the software.

## 2024-07-02 NOTE — PROGRESS NOTES
Care Management Discharge Note    Discharge Date: 07/02/2024       Discharge Disposition: Home    Discharge Services: None    Discharge DME: None    Discharge Transportation: family or friend will provide    Private pay costs discussed: Not applicable    Does the patient's insurance plan have a 3 day qualifying hospital stay waiver?  No    PAS Confirmation Code:  NA  Patient/family educated on Medicare website which has current facility and service quality ratings: NA    Education Provided on the Discharge Plan: Yes per team  Persons Notified of Discharge Plans: Patient  Patient/Family in Agreement with the Plan: yes    Handoff Referral Completed: Yes    Additional Information:  Patient discharge to home. Family will transport.    Yolande Dominguez RN

## 2024-07-02 NOTE — H&P
[facility]    History and Physical - Hospitalist Service       Date of Admission:  7/1/2024    Assessment & Plan      Maurice Biswas is a 69 year old male admitted on 7/1/2024. He came to the ED for evaluation of orthopnea, dyspnea on exertion x 3 days concerning for MG exacerbation    #Myasthenia gravis exacerbation  -Discussed with ED provider, neurology recommended continue PTA medications, NIF/FVC every 8 hours, nephrology consult for plasmapheresis every other day x 3  -PTA meds: Mycophenolate, prednisone, pyridostigmine    #Acute on chronic back pain  #Chronic compression fractures  -History of vertebroplasty of L1 on 3/28/2024; L2 and L4 on 6/4/2024  -Caution with narcotics, try to minimize given MG exacerbation while monitoring respiratory reserve  -Try lidocaine patch  -Orthopedic and pain management consults  -Continue PTA calcitonin, calcium carbonate, calcium citrate, hydromorphone    #Essential hypertension  -Continue PTA amlodipine    #Hypothyroidism  -Continue PTA levothyroxine    #Steroid-induced hyperglycemia  -Monitor glucose    #Chronic kidney disease stage II/IIIa  #IgA nephropathy  -Stable renal function  -Avoid nephrotoxins    #Anemia of chronic disease  -Stable hemoglobin    #Coagulation defect  -INR 1.16    #Lower extremity edema  -Likely fluid retention secondary to prednisone  -Monitor volume status        Diet: Combination Diet Moderate Consistent Carb (60 g CHO per Meal) Diet; Low Saturated Fat Na <2400mg Diet  DVT Prophylaxis: Pneumatic Compression Devices  [place] Catheter: Not present  Lines: None     Cardiac Monitoring: None  Code Status: Full Code        Disposition Plan     Medically Ready for Discharge: Anticipated in 2-4 Days           Asher Gregory MD  Hospitalist Service  [facility]  Securely message with Vocera (more info)  Text page via LiveRail Paging/Directory      ______________________________________________________________________    Chief Complaint   Orthopnea, dyspnea on exertion    History is obtained from the patient, electronic health record, and emergency department physician    History of Present Illness   Maurice Biswas is a 69 year old male who came to the ED for evaluation of progressive orthopnea and dyspnea on exertion for 3 days.  Past medical history of myasthenia gravis, hypertension, chronic kidney disease, IgA nephropathy, chronic anemia, steroids hyperglycemia, lumbar compression fractures, chronic back pain, psoriatic arthritis, hypothyroidism, prostate cancer.  Patient was admitted here from 8/30/2023 through 9/5/2023 and treated for myasthenia gravis exacerbation with plasmapheresis.  Most recently, he was diagnosed with a nontraumatic L1 compression fracture treated with kyphoplasty on 3/28/2024.  He had recurrent pain and was diagnosed with vertebral fractures of L2 and L4 requiring kyphoplasty on 6/4/2024.  Patient felt as though the second procedure did not improve the pain like the first.  He has tried medications like tramadol, gabapentin, Lyrica without relief.  He takes hydromorphone, however becomes nauseated and has been taking Zofran.  He denies fevers, chills, sore throat, cough, chest pain, palpitations, nausea, vomiting, diarrhea, dysuria or hematuria.  However, over the last 3 days he has noticed some shortness of breath while laying in bed.  The symptoms improve standing up but has also noted shortness of breath with exertion.      Past Medical History    History reviewed. No pertinent past medical history.    Past Surgical History   Past Surgical History:   Procedure Laterality Date    IR CVC NON TUNNEL PLACEMENT > 5 YRS  8/31/2023    IR RENAL BIOPSY LEFT  8/20/2018    PICC SINGLE LUMEN PLACEMENT  1/24/2024       Prior to Admission Medications   Prior to Admission Medications   Prescriptions Last Dose Informant Patient Reported?  Taking?   HYDROmorphone (DILAUDID) 2 MG tablet 7/1/2024 at 2mg AM  Yes Yes   Sig: Take 2-4 mg by mouth every 8 hours as needed for severe pain   Vitamin D-Vitamin K (VITAMIN K2-VITAMIN D3 PO) 7/1/2024 at AM  Yes Yes   Sig: Take 1 tablet by mouth every morning   amLODIPine (NORVASC) 10 MG tablet 7/1/2024 at AM  Yes Yes   Sig: Take 10 mg by mouth every morning   calcitonin, salmon, (MIACALCIN) 200 UNIT/ACT nasal spray Past Month at unknown  Yes Yes   Sig: Spray 1 spray into one nostril alternating nostrils daily Alternate nostril each day.   calcium carbonate (OS-SARAH) 500 MG tablet 7/1/2024 at AM  Yes Yes   Sig: Take 1 tablet by mouth daily   calcium citrate (CITRACAL) 950 (200 Ca) MG tablet 7/1/2024 at AM  Yes Yes   Sig: Take 1 tablet by mouth daily   fish oil-omega-3 fatty acids 1000 MG capsule Past Month at unknown Self Yes Yes   Sig: Take 2 capsules by mouth 2 times daily   fluocinonide (LIDEX) 0.05 % external cream Unknown at PRN  Yes Yes   Sig: Apply topically daily as needed (for psoriasis)   fluocinonide (LIDEX) 0.05 % external solution Unknown at PRN Self Yes Yes   Sig: Apply topically daily as needed (psoriasis)   fluticasone (FLONASE) 50 mcg/actuation nasal spray Past Week at PRN Self Yes Yes   Sig: Spray 2 sprays in nostril daily as needed   levothyroxine (SYNTHROID/LEVOTHROID) 112 MCG tablet 7/1/2024 at AM Self Yes Yes   Sig: Take 112 mcg by mouth daily   mycophenolate (GENERIC EQUIVALENT) 500 MG tablet 7/1/2024 at AM Self No Yes   Sig: Take 1.5 tablets (750 mg) by mouth 2 times daily   ondansetron (ZOFRAN ODT) 4 MG ODT tab 7/1/2024 at AM  Yes Yes   Sig: Take 4 mg by mouth every 8 hours as needed for nausea (Takes before taking Hydromorphone to prevent nausea)   predniSONE (DELTASONE) 10 MG tablet 6/30/2024 at 10 mg AM  Yes Yes   Sig: Take 10 mg by mouth See Admin Instructions Take 10mg every other morning with breakfast on EVEN days of the month.   predniSONE (DELTASONE) 20 MG tablet 7/1/2024 at 40mg AM   Yes Yes   Sig: Take 40 mg by mouth See Admin Instructions Take 40 mg by mouth every other morning with breakfast on ODD days of the month.   pyRIDostigmine (MESTINON) 60 MG tablet 7/1/2024 at LUNCH  Yes Yes   Sig: Take 60 mg by mouth 2 times daily (with meals) At breakfast and Lunch.   sodium chloride (OCEAN) 0.65 % nasal spray 7/1/2024 at afternoon  Yes Yes   Sig: Spray 1 spray into both nostrils 3 times daily as needed for congestion      Facility-Administered Medications: None           Physical Exam   Vital Signs: Temp: 98.8  F (37.1  C) Temp src: Temporal BP: (!) 151/94 Pulse: 87   Resp: 16 SpO2: 99 % O2 Device: None (Room air)    Weight: 185 lbs 0 oz    Constitutional: mild distress  Respiratory: no increased work of breathing and clear to auscultation  Cardiovascular: regular rate and rhythm  GI: normal bowel sounds  Skin: no bruising or bleeding  Musculoskeletal: 2+ pitting edema  Neurologic: Mental Status Exam:  Level of Alertness:   awake  Orientation:   person, place, time    Medical Decision Making       MANAGEMENT DISCUSSED with the following over the past 24 hours: Patient       Data     I have personally reviewed the following data over the past 24 hrs:    9.6  \   12.8 (L)   / 201     138 101 24.8 (H) /  128 (H)   4.9 24 1.22 (H) \     Trop: 16 BNP: 193     INR:  1.16 (H) PTT:  23   D-dimer:  0.67 (H) Fibrinogen:  N/A       Imaging results reviewed over the past 24 hrs:   Recent Results (from the past 24 hour(s))   XR Chest 2 Views    Narrative    EXAM: XR CHEST 2 VIEWS  LOCATION: [facility]  DATE: 7/1/2024    INDICATION: Shortness of breath  COMPARISON: Chest CT from 10/23/2023      Impression    IMPRESSION: Lungs are clear. No effusions or pneumothorax. Heart size is normal. Vertebroplasty changes in the spine. Degenerative changes of the spine. No acute osseous findings.

## 2024-07-05 ENCOUNTER — PATIENT OUTREACH (OUTPATIENT)
Dept: CARE COORDINATION | Facility: CLINIC | Age: 69
End: 2024-07-05
Payer: COMMERCIAL

## 2024-07-05 NOTE — PROGRESS NOTES
[facility] Contact  [facility]/Voicemail     Clinical Data: Post-Discharge Outreach     Outreach attempted x 2.  Left message on patient's voicemail, providing [facility]'s central phone number of [phone] ([phone]) for questions/concerns and/or to schedule an appt with an [facility] provider, if they do not have a PCP.      Plan:  [facility] will do no further outreaches at this time.       CRISTEL Melgoza  [phone]  [facility]

## 2024-07-06 LAB
ATRIAL RATE - MUSE: 72 BPM
DIASTOLIC BLOOD PRESSURE - MUSE: NORMAL MMHG
INTERPRETATION ECG - MUSE: NORMAL
P AXIS - MUSE: 32 DEGREES
PR INTERVAL - MUSE: 146 MS
QRS DURATION - MUSE: 80 MS
QT - MUSE: 388 MS
QTC - MUSE: 424 MS
R AXIS - MUSE: -45 DEGREES
SYSTOLIC BLOOD PRESSURE - MUSE: NORMAL MMHG
T AXIS - MUSE: 27 DEGREES
VENTRICULAR RATE- MUSE: 72 BPM

## 2024-07-12 NOTE — PROGRESS NOTES
In person evaluation        HPI  8/30/2023, hospital consultation  9/26/2023, in person visit  1/10/2024, in person visit  7/15/2024, in person visit    69-year-old being evaluated neurologically for:  Myasthenia gravis  B12 deficiency  Chronic kidney disease    Since last seen  Patient has had difficulty with L2/L3/L4 compression fractures    Patient went to the ER 7/1/2024 complained of shortness of breath  Patient thought that this was like his myasthenia gravis exacerbation  NIF vital capacity okay mild well (NIF -60)  They had increased his CellCept because it was thought that he was having exacerbation but after evaluated the sensation of needing to take of breath was from the compression fractures and brace that he was wearing    Patient with significant side effects from prednisone  Thin skin with easy bruising  Accelerated thought process  Poor sleep  Compression fractures    We are trying to taper him down  Currently on prednisone   40 mg on the even day  10 mg on the day (will stop this 7/15/2024) see if he can have a day with no prednisone and alternate    Mestinon 60 mg  Some diarrhea  Some difficulty with increased saliva  Decreased dose 60 mg in the morning 30 mg in the afternoon (7/15/2024)    CellCept back to his previous dose of 750 twice daily    Patient will call in August and we will decide if we can decrease the even day of prednisone as above  He will follow-up in November    Myasthenia gravis quite stable on 7/15/2024  Talking is good prolonged in paragraphs without difficulty  No shortness of breath  No slurred speech  No dysarthria  No dysphagia  No diplopia  Proximal motor strength good    We discussed  Side effects of CellCept/prednisone            A.  Myasthenia gravis        Myasthenia gravis crises 8/30/2023        ER 8/30/2023 with increasing symptoms from his myasthenia gravis.        Has had the diagnosis for at least 2 years followed at the [facility]         Recently seen at the  "[facility] day before admission has been on prednisone 60 mg for couple of days.           Patient had increasing fatigue during the month of August has seen his primary regard to this.           9/26/2023.  No shortness of breath  Talks and paragraphs  Some difficulty with swallowing in the morning but okay once he starts his meds  No significant diplopia  A little bit of left eyelid ptosis but not as bad as before  No proximal muscle weakness  Complains of feeling \"tired\"           Able to talk in long sentences         Able to swallow okay at bedside    B.  CKD 3 a, secondary to IgA nephropathy        Followed by Dr. Mcfarlane of [facility] [place] ([phone] for kidney disease)        CKD 3a, Ig A nephropathy.         Kidney biopsy done in August 2018 with IgA nephropathy with moderate interstitial fibrosis and tubular atrophy     Patient states that he is kidney doctor was okay with IV IgG and the osmotic load if necessary in the future.      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  With these symptoms he has moved to more generalized myasthenia gravis    He was started on steroids fairly recently in August and ramped up fairly quickly  In some patients they can get slight worsening of their myasthenia gravis before it improves with a quick increase in prednisone.    Recommend short course of plasma exchange to bring things under control August 30, 2023 when he presented to the ER now stabilized        Past medical " history  Myasthenia gravis (November 2021)  CKD IgA nephropathy  Hypertension  Hypothyroidism  Prostatism/prostate cancer  Anemia  Psoriasis  Kidney stone  Adenomatous polyp    Habits  Past smoker quit  Does not drink alcohol    Family history  Father colon cancer  Mother heart disease/hypertension  Maternal aunt heart disease  Sister hypertension      Work-up           8/30/2023  NIF       -40  VC       2.38  CT scan chest 10/30/2023  No thymic mass or other acute cardiopulmonary abnormality.   B12 174,  (10/18/2023)     Laboratory data review    NA/K                        138/4.0  BUN/Cr                     29/1.24  GLU                          78  Wbc/hgb                  11.8/12.3  Plts                           187,000      Exam  Review of systems  Pertinent positives and negatives  No diplopia no dysarthria no dysphagia  No ptosis  No shortness of breath talks in long sentences and paragraphs  Does have some thin skin with bruising    No ataxia  Myasthenia gravis symptoms stable  Does wear a brace for compression fractures    Otherwise review of systems negative      General exam  Blood pressure 128/78, pulse 72  Alert orient x3  HEENT no ptosis even with extended eye elevation  Lungs clear  Heart rate regular  Abdomen soft  Symmetrical pulses  No edema in the feet        Neurologic exam  Alert orient x3  Normal prosody of speech  Normal naming  Normal comprehension  Normal repetition  No aphasia  No neglect  Memory recall okay    Cranials 2 through 12  No ophthalmoplegia  No ptosis with extended eye elevation today  Eye closure strong  Mouth closure strong  Visual fields intact  Face symmetrical  Tongue twisters good      Neck flexors strong  Neck extensors strong    Upper extremities reported right over left  Deltoid 5/5  Biceps 5/5  Triceps 5/5  Wrist/finger extensors 5/5  Wrist/finger flexors 5/5  Intrinsic hand strength 5/5    No drift no tremor normal rapid alternating movements    Lower  extremities reported right over left  Iliopsoas 5/5  Quadriceps 5/5  Hamstring 5/5  Anterior tibial 5/5    Gait  Able to stand up without difficulty marches in place   Fairly quick gait even with back brace        Assessment/plan    Myasthenia gravis exacerbation       Onset November 2021/diagnosis at the [facility]    2.  CKD stage III with IgA nephropathy with moderate interstitial fibrosis and tubular atrophy on previous biopsy       Concern with above renal disease patient not a good candidate for IV IgG.    3.   B12 deficiency diagnosed by primary MD        B12 174,  (10/18/2023)        Primary MD was starting B12 replacement    4.  Compression fractures L3/L4/L5       Follows with [facility] specialist and kyphoplasty       Seeing specialist for treatment of osteoporosis       We are trying to taper down on the prednisone    Diagnosis  Myasthenia gravis exacerbation  CKD stage III with IgA nephropathy  B12 deficiency (diagnosed 10/18/2023)        Patient's nephrologist said that IV IgG would be okay osmotic load would be okay for the kidney  We would stay for this for if he has a significant exacerbation    CT scan chest negative for thymic tumor or mass October 2023  CT scan of the chest rule out thymic tumor    CellCept 750 mg twice daily (started September 2023)    We are trying to taper him down  Currently on prednisone   40 mg on the even day  10 mg on the day (will stop this 7/15/2024) see if he can have a day with no prednisone and alternate    Mestinon 60 mg  Some diarrhea  Some difficulty with increased saliva  Decreased dose 60 mg in the morning 30 mg in the afternoon (7/15/2024)    CellCept back to his previous dose of 750 twice daily    Patient will call in August and we will decide if we can decrease the even day of prednisone as above  He will follow-up in November    Mestinon 60 mg tablet 1 tab at 830, 1 tab at 1:30 PM  Could decrease to half a tablet twice per day if still with teary eyes  or increased saliva or diarrhea      Previously discussed other treatment options  Rediscussed the diagnosis and treatment risks and benefits of various treatments as above  Also talked about Rituxan and other treatments should they become necessary  Talked about the more generalized nature of his myasthenia gravis at this time    Current plan  CellCept 750 p.o. twice daily  Mestinon 60 mg / 30 mg  Prednisone 40 mg alternating with zero    Call end of August to see if we can decrease the 40 mg to 30 mg  Follow-up in November for recheck    Prolonged discussion about all of the above.  Reviewed hospital chart    Total care time today 40 minutes  The longitudinal plan of care for the diagnosis(es)/condition(s) as documented were addressed during this visit. Due to the added complexity in care, I will continue to support Maurice in the subsequent management and with ongoing continuity of care.      Addendum 10/9/2024  Patient decrease prednisone to 30 mg every other day (9/9/2024)  Patient doing well can decrease prednisone to 20 mg every other day (10/9/2024)  Keep follow-up visit as planned 11/15/2024

## 2024-07-15 ENCOUNTER — OFFICE VISIT (OUTPATIENT)
Dept: NEUROLOGY | Facility: CLINIC | Age: 69
End: 2024-07-15
Payer: COMMERCIAL

## 2024-07-15 VITALS
WEIGHT: 180 LBS | BODY MASS INDEX: 26.66 KG/M2 | DIASTOLIC BLOOD PRESSURE: 78 MMHG | HEART RATE: 72 BPM | SYSTOLIC BLOOD PRESSURE: 128 MMHG | HEIGHT: 69 IN

## 2024-07-15 DIAGNOSIS — G70.00 MYASTHENIA GRAVIS (H): Primary | ICD-10-CM

## 2024-07-15 PROCEDURE — 99215 OFFICE O/P EST HI 40 MIN: CPT | Performed by: PSYCHIATRY & NEUROLOGY

## 2024-07-15 PROCEDURE — G2211 COMPLEX E/M VISIT ADD ON: HCPCS | Performed by: PSYCHIATRY & NEUROLOGY

## 2024-07-15 RX ORDER — PYRIDOSTIGMINE BROMIDE 60 MG/1
60 TABLET ORAL 2 TIMES DAILY
Qty: 60 TABLET | Refills: 11 | Status: SHIPPED | OUTPATIENT
Start: 2024-07-15

## 2024-07-15 RX ORDER — MYCOPHENOLATE MOFETIL 500 MG/1
750 TABLET ORAL 2 TIMES DAILY
Qty: 90 TABLET | Refills: 11 | Status: SHIPPED | OUTPATIENT
Start: 2024-07-15

## 2024-07-15 NOTE — NURSING NOTE
Chief Complaint   Patient presents with    Myasthenia Gravis     6 month follow up. He has fractures of L2, L3 and L4 due to the prednisone.     Aurora Lorenzo LPN on 7/15/2024 at 3:05 PM

## 2024-07-15 NOTE — LETTER
7/15/2024      Maurice Biswas  [address] [address]      Dear Colleague,    Thank you for referring your patient, Maurice Biswas, to the [facility] [place]. Please see a copy of my visit note below.    In person evaluation        HPI  8/30/2023, hospital consultation  9/26/2023, in person visit  1/10/2024, in person visit  7/15/2024, in person visit    69-year-old being evaluated neurologically for:  Myasthenia gravis  B12 deficiency  Chronic kidney disease    Since last seen  Patient has had difficulty with L2/L3/L4 compression fractures    Patient went to the ER 7/1/2024 complained of shortness of breath  Patient thought that this was like his myasthenia gravis exacerbation  NIF vital capacity okay mild well (NIF -60)  They had increased his CellCept because it was thought that he was having exacerbation but after evaluated the sensation of needing to take of breath was from the compression fractures and brace that he was wearing    Patient with significant side effects from prednisone  Thin skin with easy bruising  Accelerated thought process  Poor sleep  Compression fractures    We are trying to taper him down  Currently on prednisone   40 mg on the even day  10 mg on the day (will stop this 7/15/2024) see if he can have a day with no prednisone and alternate    Mestinon 60 mg  Some diarrhea  Some difficulty with increased saliva  Decreased dose 60 mg in the morning 30 mg in the afternoon (7/15/2024)    CellCept back to his previous dose of 750 twice daily    Patient will call in August and we will decide if we can decrease the even day of prednisone as above  He will follow-up in November    Myasthenia gravis quite stable on 7/15/2024  Talking is good prolonged in paragraphs without difficulty  No shortness of breath  No slurred speech  No dysarthria  No dysphagia  No diplopia  Proximal motor strength good    We discussed  Side effects of  "CellCept/prednisone            A.  Myasthenia gravis        Myasthenia gravis crises 8/30/2023        ER 8/30/2023 with increasing symptoms from his myasthenia gravis.        Has had the diagnosis for at least 2 years followed at the [facility]         Recently seen at the [facility] day before admission has been on prednisone 60 mg for couple of days.           Patient had increasing fatigue during the month of August has seen his primary regard to this.           9/26/2023.  No shortness of breath  Talks and paragraphs  Some difficulty with swallowing in the morning but okay once he starts his meds  No significant diplopia  A little bit of left eyelid ptosis but not as bad as before  No proximal muscle weakness  Complains of feeling \"tired\"           Able to talk in long sentences         Able to swallow okay at bedside    B.  CKD 3 a, secondary to IgA nephropathy        Followed by Dr. Mcfarlane of [facility] [place] ([phone] for kidney disease)        CKD 3a, Ig A nephropathy.         Kidney biopsy done in August 2018 with IgA nephropathy with moderate interstitial fibrosis and tubular atrophy     Patient states that he is kidney doctor was okay with IV IgG and the osmotic load if necessary in the future.      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  With these symptoms he has moved to more generalized myasthenia gravis    He was started on steroids fairly recently in August and ramped up fairly quickly  In some patients they can " get slight worsening of their myasthenia gravis before it improves with a quick increase in prednisone.    Recommend short course of plasma exchange to bring things under control August 30, 2023 when he presented to the ER now stabilized        Past medical history  Myasthenia gravis (November 2021)  CKD IgA nephropathy  Hypertension  Hypothyroidism  Prostatism/prostate cancer  Anemia  Psoriasis  Kidney stone  Adenomatous polyp    Habits  Past smoker quit  Does not drink alcohol    Family history  Father colon cancer  Mother heart disease/hypertension  Maternal aunt heart disease  Sister hypertension      Work-up           8/30/2023  NIF       -40  VC       2.38  CT scan chest 10/30/2023  No thymic mass or other acute cardiopulmonary abnormality.   B12 174,  (10/18/2023)     Laboratory data review    NA/K                        138/4.0  BUN/Cr                     29/1.24  GLU                          78  Wbc/hgb                  11.8/12.3  Plts                           187,000      Exam  Review of systems  Pertinent positives and negatives  No diplopia no dysarthria no dysphagia  No ptosis  No shortness of breath talks in long sentences and paragraphs  Does have some thin skin with bruising    No ataxia  Myasthenia gravis symptoms stable  Does wear a brace for compression fractures    Otherwise review of systems negative      General exam  Blood pressure 128/78, pulse 72  Alert orient x3  HEENT no ptosis even with extended eye elevation  Lungs clear  Heart rate regular  Abdomen soft  Symmetrical pulses  No edema in the feet        Neurologic exam  Alert orient x3  Normal prosody of speech  Normal naming  Normal comprehension  Normal repetition  No aphasia  No neglect  Memory recall okay    Cranials 2 through 12  No ophthalmoplegia  No ptosis with extended eye elevation today  Eye closure strong  Mouth closure strong  Visual fields intact  Face symmetrical  Tongue twisters good      Neck flexors strong  Neck  extensors strong    Upper extremities reported right over left  Deltoid 5/5  Biceps 5/5  Triceps 5/5  Wrist/finger extensors 5/5  Wrist/finger flexors 5/5  Intrinsic hand strength 5/5    No drift no tremor normal rapid alternating movements    Lower extremities reported right over left  Iliopsoas 5/5  Quadriceps 5/5  Hamstring 5/5  Anterior tibial 5/5    Gait  Able to stand up without difficulty marches in place   Fairly quick gait even with back brace        Assessment/plan    Myasthenia gravis exacerbation       Onset November 2021/diagnosis at the [facility]    2.  CKD stage III with IgA nephropathy with moderate interstitial fibrosis and tubular atrophy on previous biopsy       Concern with above renal disease patient not a good candidate for IV IgG.    3.   B12 deficiency diagnosed by primary MD        B12 174,  (10/18/2023)        Primary MD was starting B12 replacement    4.  Compression fractures L3/L4/L5       Follows with [facility] and kyphoplasty       Seeing specialist for treatment of osteoporosis       We are trying to taper down on the prednisone    Diagnosis  Myasthenia gravis exacerbation  CKD stage III with IgA nephropathy  B12 deficiency (diagnosed 10/18/2023)        Patient's nephrologist said that IV IgG would be okay osmotic load would be okay for the kidney  We would stay for this for if he has a significant exacerbation    CT scan chest negative for thymic tumor or mass October 2023  CT scan of the chest rule out thymic tumor    CellCept 750 mg twice daily (started September 2023)    We are trying to taper him down  Currently on prednisone   40 mg on the even day  10 mg on the day (will stop this 7/15/2024) see if he can have a day with no prednisone and alternate    Mestinon 60 mg  Some diarrhea  Some difficulty with increased saliva  Decreased dose 60 mg in the morning 30 mg in the afternoon (7/15/2024)    CellCept back to his previous dose of 750 twice daily    Patient  will call in August and we will decide if we can decrease the even day of prednisone as above  He will follow-up in November    Mestinon 60 mg tablet 1 tab at 830, 1 tab at 1:30 PM  Could decrease to half a tablet twice per day if still with teary eyes or increased saliva or diarrhea      Previously discussed other treatment options  Rediscussed the diagnosis and treatment risks and benefits of various treatments as above  Also talked about Rituxan and other treatments should they become necessary  Talked about the more generalized nature of his myasthenia gravis at this time    Current plan  CellCept 750 p.o. twice daily  Mestinon 60 mg / 30 mg  Prednisone 40 mg alternating with zero    Call end of August to see if we can decrease the 40 mg to 30 mg  Follow-up in November for recheck    Prolonged discussion about all of the above.  Reviewed hospital chart    Total care time today 40 minutes  The longitudinal plan of care for the diagnosis(es)/condition(s) as documented were addressed during this visit. Due to the added complexity in care, I will continue to support Maurice in the subsequent management and with ongoing continuity of care.                  Again, thank you for allowing me to participate in the care of your patient.        Sincerely,        leola Alamo MD

## 2024-09-04 ENCOUNTER — TELEPHONE (OUTPATIENT)
Dept: NEUROLOGY | Facility: CLINIC | Age: 69
End: 2024-09-04
Payer: COMMERCIAL

## 2024-09-04 DIAGNOSIS — G70.00 MYASTHENIA GRAVIS (H): Primary | ICD-10-CM

## 2024-09-04 NOTE — TELEPHONE ENCOUNTER
[facility] Call Center    Phone Message    May a detailed message be left on voicemail: yes     Reason for Call: Patient requests a call back concerning medications that Dr. Alamo reduced dose.  (Prednisone and Pyridostigmine)    Action Taken: [facility]    Travel Screening: Not Applicable     Date of Service:

## 2024-09-06 RX ORDER — PREDNISONE 10 MG/1
TABLET ORAL
Qty: 45 TABLET | Refills: 11 | Status: SHIPPED | OUTPATIENT
Start: 2024-09-06

## 2024-09-06 NOTE — TELEPHONE ENCOUNTER
Discussed prednisone dose decrease with patient, he is agreeable.     RN cannot send in new rx, but I pended it for provider review and signature below.     Prednisone 30mg every other day, qty 45 refills 2.     Pt will call if he has any symptom concerns with reduced dose and will call with update in 1 month.     Thank you   Deshawn Pedro RN, BSN  [facility]

## 2024-09-06 NOTE — TELEPHONE ENCOUNTER
Patient can decrease prednisone dose to 30 mg every other day.  Please check with patient and he probably will need a prednisone prescription for 10 mg tablets, taking 3 tablets every other day.  Please send this in for him thank you.  Also let him know about the dose change.  leola Alamo MD on 9/6/2024 at 12:36 PM

## 2024-10-07 ENCOUNTER — TELEPHONE (OUTPATIENT)
Dept: NEUROLOGY | Facility: CLINIC | Age: 69
End: 2024-10-07
Payer: COMMERCIAL

## 2024-10-07 NOTE — TELEPHONE ENCOUNTER
[facility] Call Center    Phone Message    May a detailed message be left on voicemail: yes     Reason for Call: Medication Question or concern regarding medication   Prescription Clarification  Name of Medication:   predniSONE (DELTASONE)     pyRIDostigmine (MESTINON)  Prescribing Provider: Leonel Alamo MD   Pharmacy:    What on the order needs clarification? PT would like to discuss further reducing the medications.    Please call Maurice at [phone] to discuss further.      Action Taken: Message routed to:  Other: [facility]    Travel Screening: Not Applicable     Date of Service:

## 2024-10-08 NOTE — TELEPHONE ENCOUNTER
Pt decreased prednisone to 30mg every other day on 9/6. Is tolerating well, asking about further reduction of dose? Please advise.     Deshawn Pedro RN, BSN  [facility]

## 2024-10-09 NOTE — TELEPHONE ENCOUNTER
Patient decrease prednisone to 30 mg every other day (9/9/2024)  Patient doing well can decrease prednisone to 20 mg every other day (10/9/2024)  Keep follow-up visit as planned 11/15/2024  Let patient know thank you.  leola Alamo MD on 10/9/2024 at 4:11 PM

## 2024-10-10 NOTE — TELEPHONE ENCOUNTER
RN returned call to pt, he is agreeable to 20mg prednisone dose every other day. Reports that he has 2 additional compression fractures in spine, but is tolerating decreased prednisone without any adverse effects.     Will follow up 11/15 as scheduled.       Deshawn Pedro RN, BSN  [facility]

## 2024-10-27 ENCOUNTER — APPOINTMENT (OUTPATIENT)
Dept: RADIOLOGY | Facility: HOSPITAL | Age: 69
End: 2024-10-27
Attending: EMERGENCY MEDICINE
Payer: COMMERCIAL

## 2024-10-27 ENCOUNTER — APPOINTMENT (OUTPATIENT)
Dept: ULTRASOUND IMAGING | Facility: HOSPITAL | Age: 69
End: 2024-10-27
Attending: EMERGENCY MEDICINE
Payer: COMMERCIAL

## 2024-10-27 ENCOUNTER — HOSPITAL ENCOUNTER (EMERGENCY)
Facility: HOSPITAL | Age: 69
Discharge: HOME | End: 2024-10-27
Attending: EMERGENCY MEDICINE | Admitting: EMERGENCY MEDICINE
Payer: COMMERCIAL

## 2024-10-27 VITALS
SYSTOLIC BLOOD PRESSURE: 122 MMHG | DIASTOLIC BLOOD PRESSURE: 64 MMHG | TEMPERATURE: 97.7 F | BODY MASS INDEX: 27.51 KG/M2 | WEIGHT: 181.5 LBS | HEART RATE: 60 BPM | RESPIRATION RATE: 16 BRPM | HEIGHT: 68 IN | OXYGEN SATURATION: 97 %

## 2024-10-27 DIAGNOSIS — M71.21 SYNOVIAL CYST OF RIGHT POPLITEAL SPACE: ICD-10-CM

## 2024-10-27 PROBLEM — I12.9 BENIGN HYPERTENSIVE KIDNEY DISEASE: Status: ACTIVE | Noted: 2023-12-04

## 2024-10-27 PROBLEM — M15.0 PRIMARY OSTEOARTHRITIS INVOLVING MULTIPLE JOINTS: Status: ACTIVE | Noted: 2021-05-14

## 2024-10-27 PROBLEM — N25.0 RENAL OSTEODYSTROPHY: Chronic | Status: ACTIVE | Noted: 2022-04-25

## 2024-10-27 PROBLEM — H53.2 DIPLOPIA: Status: ACTIVE | Noted: 2021-07-31

## 2024-10-27 PROBLEM — G89.29 CHRONIC PAIN OF BOTH KNEES: Status: ACTIVE | Noted: 2021-05-14

## 2024-10-27 PROBLEM — N40.0 PROSTATISM: Status: ACTIVE | Noted: 2022-04-25

## 2024-10-27 PROBLEM — M25.561 CHRONIC PAIN OF BOTH KNEES: Status: ACTIVE | Noted: 2021-05-14

## 2024-10-27 PROBLEM — D12.6 ADENOMATOUS POLYP OF COLON: Status: ACTIVE | Noted: 2020-12-09

## 2024-10-27 PROBLEM — H02.402 PTOSIS OF EYELID, LEFT: Status: ACTIVE | Noted: 2021-07-31

## 2024-10-27 PROBLEM — M25.562 CHRONIC PAIN OF BOTH KNEES: Status: ACTIVE | Noted: 2021-05-14

## 2024-10-27 PROBLEM — M81.0 OSTEOPOROSIS: Status: ACTIVE | Noted: 2024-06-07

## 2024-10-27 PROBLEM — N20.0 NEPHROLITHIASIS: Status: ACTIVE | Noted: 2023-02-03

## 2024-10-27 LAB
ANION GAP SERPL CALCULATED.3IONS-SCNC: 12 MMOL/L (ref 7–15)
APTT PPP: 26 SECONDS (ref 22–38)
BUN SERPL-MCNC: 19.3 MG/DL (ref 8–23)
CALCIUM SERPL-MCNC: 9.3 MG/DL (ref 8.8–10.4)
CHLORIDE SERPL-SCNC: 104 MMOL/L (ref 98–107)
CREAT SERPL-MCNC: 1.17 MG/DL (ref 0.67–1.17)
CRP SERPL-MCNC: 3.8 MG/L
EGFRCR SERPLBLD CKD-EPI 2021: 67 ML/MIN/1.73M2
ERYTHROCYTE [DISTWIDTH] IN BLOOD BY AUTOMATED COUNT: 13.1 % (ref 10–15)
GLUCOSE SERPL-MCNC: 107 MG/DL (ref 70–99)
HCO3 SERPL-SCNC: 25 MMOL/L (ref 22–29)
HCT VFR BLD AUTO: 41.8 % (ref 40–53)
HGB BLD-MCNC: 13.6 G/DL (ref 13.3–17.7)
INR PPP: 1.12 (ref 0.85–1.15)
MCH RBC QN AUTO: 29.2 PG (ref 26.5–33)
MCHC RBC AUTO-ENTMCNC: 32.5 G/DL (ref 31.5–36.5)
MCV RBC AUTO: 90 FL (ref 78–100)
PLATELET # BLD AUTO: 209 10E3/UL (ref 150–450)
POTASSIUM SERPL-SCNC: 3.9 MMOL/L (ref 3.4–5.3)
RBC # BLD AUTO: 4.65 10E6/UL (ref 4.4–5.9)
SODIUM SERPL-SCNC: 141 MMOL/L (ref 135–145)
WBC # BLD AUTO: 10.7 10E3/UL (ref 4–11)

## 2024-10-27 PROCEDURE — 250N000013 HC RX MED GY IP 250 OP 250 PS 637: Performed by: EMERGENCY MEDICINE

## 2024-10-27 PROCEDURE — 85610 PROTHROMBIN TIME: CPT | Performed by: EMERGENCY MEDICINE

## 2024-10-27 PROCEDURE — 99284 EMERGENCY DEPT VISIT MOD MDM: CPT | Mod: 25

## 2024-10-27 PROCEDURE — 93971 EXTREMITY STUDY: CPT | Mod: RT

## 2024-10-27 PROCEDURE — 36415 COLL VENOUS BLD VENIPUNCTURE: CPT | Performed by: EMERGENCY MEDICINE

## 2024-10-27 PROCEDURE — 80048 BASIC METABOLIC PNL TOTAL CA: CPT | Performed by: EMERGENCY MEDICINE

## 2024-10-27 PROCEDURE — 73560 X-RAY EXAM OF KNEE 1 OR 2: CPT | Mod: RT

## 2024-10-27 PROCEDURE — 85730 THROMBOPLASTIN TIME PARTIAL: CPT | Performed by: EMERGENCY MEDICINE

## 2024-10-27 PROCEDURE — 86140 C-REACTIVE PROTEIN: CPT | Performed by: EMERGENCY MEDICINE

## 2024-10-27 PROCEDURE — 85027 COMPLETE CBC AUTOMATED: CPT | Performed by: EMERGENCY MEDICINE

## 2024-10-27 RX ORDER — HYDROMORPHONE HYDROCHLORIDE 2 MG/1
2 TABLET ORAL ONCE
Status: COMPLETED | OUTPATIENT
Start: 2024-10-27 | End: 2024-10-27

## 2024-10-27 RX ADMIN — HYDROMORPHONE HYDROCHLORIDE 2 MG: 2 TABLET ORAL at 13:53

## 2024-10-27 ASSESSMENT — COLUMBIA-SUICIDE SEVERITY RATING SCALE - C-SSRS
2. HAVE YOU ACTUALLY HAD ANY THOUGHTS OF KILLING YOURSELF IN THE PAST MONTH?: NO
1. IN THE PAST MONTH, HAVE YOU WISHED YOU WERE DEAD OR WISHED YOU COULD GO TO SLEEP AND NOT WAKE UP?: NO
6. HAVE YOU EVER DONE ANYTHING, STARTED TO DO ANYTHING, OR PREPARED TO DO ANYTHING TO END YOUR LIFE?: NO

## 2024-10-27 ASSESSMENT — ACTIVITIES OF DAILY LIVING (ADL)
ADLS_ACUITY_SCORE: 0

## 2024-10-27 NOTE — DISCHARGE INSTRUCTIONS
Please follow-up with the [facility] early this upcoming week; call tomorrow morning to arrange appointment.    Return to the ER for worsening symptoms, worsening pain, swelling / redness to the knee / leg, weakness or numbness of the right leg, fever or other concerns.    Use the ACE wrap for gentle compression.

## 2024-10-27 NOTE — ED TRIAGE NOTES
Patient with R leg pain that began on Friday. Patient states pain is localized to behind his knee and calf. Has chronic knee pain, but this is different. Patient takes Tramadol and Dilaudid for chronic back pain, and those medications did not touch the pain to his leg. Denies any hx of blood clots and is not on blood thinners.      Triage Assessment (Adult)       Row Name 10/27/24 1054          Triage Assessment    Airway WDL WDL        Respiratory WDL    Respiratory WDL WDL        Skin Circulation/Temperature WDL    Skin Circulation/Temperature WDL WDL        Cardiac WDL    Cardiac WDL WDL        Peripheral/Neurovascular WDL    Peripheral Neurovascular WDL WDL        Cognitive/Neuro/Behavioral WDL    Cognitive/Neuro/Behavioral WDL WDL

## 2024-10-27 NOTE — ED PROVIDER NOTES
Emergency Department Encounter     Evaluation Date & Time:   No admission date for patient encounter.    CHIEF COMPLAINT:  Leg Pain      Triage Note:       Impression and Plan       FINAL IMPRESSION:    ICD-10-CM    1. Synovial cyst of right popliteal space  M71.21 Orthopedic  Referral            ED COURSE & MEDICAL DECISION MAKIN:11 AM I met with the patient for the initial interview and physical examination. Discussed plan for treatment and workup in the ED.    1:53 PM Patient to be discharged by RN.    69 year old male, history of prostate cancer, myasthenia gravis, septic bursitis, sinus bradycardia, HTN, CKD and hypothyroidism, who presents for evaluation of atraumatic pain to the right popliteal fossa x 1.5 days, which has started to radiate to the lateral aspect of the right calf.     He reports chronic right knee and low back pain, however this feels different.     He denies associated extremity weakness and new paresthesias (baseline numbness due to neuropathy).     No fevers.    Denies recent travel.    On exam, there is mild swelling right knee compared to the left with no associated erythema or warmth. No pain with active and passive ROM of the knee to suggest infectious etiology, such as septic joint. There is mild tenderness to palpation over the lateral aspect of the knee.  CMS intact.    Labs remarkable for no leukocytosis (WBC 10.7) with normal CRP (3.8).  He has no significant electrolyte derangements or renal impairment.  Coags WNL.    X-rays right knee demonstrate anatomic alignment right knee with no acute displaced right knee fracture. Advanced medial compartment right knee osteoarthritis. Patellofemoral spurring. No sizable right knee joint effusion. Extensor tendon enthesopathy at the patella. Arterial calcification.    DVT and popliteal cyst considered for which venous ultrasound was performed and demonstrated no DVT RLE with a bilobed, complex popliteal fossa  cyst.    Patient has tried several medications for pain, including acetaminophen, tramadol, Dilaudid, Voltaren and lidocaine patches without relief.  He has also tried a knee brace, icing and application of heat.    ACE wrap applied for gentle compression and patient was given a dose of po Dilaudid in the ED.    Patient discharged to home with follow-up with [facility]; a referral was ordered.  Return precautions provided.  Patient stable throughout ED course.      At the conclusion of the encounter I discussed the results of all the tests and the disposition. The questions were answered. The patient and family acknowledged understanding and were agreeable with the care plan.      Medical Decision Making    History:  Obtained supplemental history:Supplemental history obtained?: No  Reviewed external records: External records reviewed?: No    External consultation:  Did you consider but not order tests?: Work up considered but not performed and documented in chart, if applicable  Did you interpret images independently?: Independent interpretation of ECG and images noted in documentation, when applicable.  Consultation discussion with other provider:Did you involve another provider (consultant, MH, pharmacy, etc.)?: No    Complicating factors:  Care impacted by chronic illness:Chronic Kidney Disease and Hypertension, myasthenia gravis, prostate cancer, hypothyroidism  Care significantly affected by social determinants of health:N/A    Disposition considerations: Discharge. I recommended the patient continue their current prescription strength medication(s): narcotic pain medications. I considered admission, but ultimately discharged patient given reassuring evaluation.      MEDICATIONS GIVEN IN THE EMERGENCY DEPARTMENT:  Medications   HYDROmorphone (DILAUDID) tablet 2 mg (2 mg Oral $Given 10/27/24 0967)       NEW PRESCRIPTIONS STARTED AT TODAY'S ED VISIT:  New Prescriptions    No medications on file       HPI      HPI     Maurice Biswas is a 69 year old male, history of hypertension, chronic kidney disease stage 3a, osteoarthritis, osteoporosis, septic bursitis, and myasthenia gravis, who presents to this ED by walk-in for evaluation of leg pain.    Patient reports chronic right knee pain / soreness for years. Today, he presents with 1.5 days of new pain to the back and sides of his right knee radiating down his right lower leg. He also endorses increased right knee swelling compared to his left. He denies recent travel, recent long car rides, and known injuries to his right knee. Patient has a history of neuropathy in his feet, and reports that his right foot is not more numb than his left at this time. Denies lower extremity weakness. Patient has tried tramadol without relief and dilaudid with 1 hour of relief before his pain returned. No associated fevers.     Patient reports a history of fractures in all of his lumbar vertebrae. He sustained some of the fractures in March of 2024 and had kyphoplasty surgery in June. His pain worsened, so he saw an orthopedic surgeon for an x-ray that found more fractures in his L3 and L5. Patient has another orthopedic surgery scheduled for 11/2/24. He does not feel like his back problems are connected to his leg pain. He denies urinary retention and incontinence of urine or stool.    Patient otherwise denies chest pain, shortness of breath, abdominal pain, and any other symptoms or complaints at this time.     REVIEW OF SYSTEMS:  All other systems reviewed and are negative.      Medical History     History reviewed. No pertinent past medical history.    Past Surgical History:   Procedure Laterality Date    IR CVC NON TUNNEL PLACEMENT > 5 YRS  8/31/2023    IR RENAL BIOPSY LEFT  8/20/2018    PICC SINGLE LUMEN PLACEMENT  1/24/2024       No family history on file.    Social History     Tobacco Use    Smoking status: Never    Smokeless tobacco: Never   Substance Use Topics    Alcohol use:  "No    Drug use: No       amLODIPine (NORVASC) 10 MG tablet  calcium carbonate (OS-SARAH) 500 MG tablet  calcium citrate (CITRACAL) 950 (200 Ca) MG tablet  fish oil-omega-3 fatty acids 1000 MG capsule  fluocinonide (LIDEX) 0.05 % external cream  fluocinonide (LIDEX) 0.05 % external solution  fluticasone (FLONASE) 50 mcg/actuation nasal spray  HYDROmorphone (DILAUDID) 2 MG tablet  levothyroxine (SYNTHROID/LEVOTHROID) 112 MCG tablet  Lidocaine (LIDOCARE) 4 % Patch  mycophenolate (GENERIC EQUIVALENT) 500 MG tablet  ondansetron (ZOFRAN ODT) 4 MG ODT tab  predniSONE (DELTASONE) 10 MG tablet  pyRIDostigmine (MESTINON) 60 MG tablet  sodium chloride (OCEAN) 0.65 % nasal spray  Vitamin D-Vitamin K (VITAMIN K2-VITAMIN D3 PO)        Physical Exam     First Vitals:  Patient Vitals for the past 24 hrs:   BP Temp Temp src Pulse Resp SpO2 Height Weight   10/27/24 1123 120/62 -- -- 65 -- 96 % -- --   10/27/24 1055 121/62 97.7  F (36.5  C) Oral 72 16 96 % 1.727 m (5' 8\") 82.3 kg (181 lb 8 oz)       PHYSICAL EXAM:   Physical Exam    GENERAL: Awake, alert.  In mild acute distress.   HEENT: Normocephalic, atraumatic.   NECK: No stridor.  PULMONARY: Symmetrical breath sounds without distress.  Lungs clear to auscultation bilaterally without wheezes, rhonchi or rales.  CARDIO: Regular rate and rhythm.  No significant murmur, rub or gallop.  Pedal pulses strong and symmetrical.   ABDOMINAL: Abdomen soft, non-distended and non-tender to palpation.    EXTREMITIES: Mild swelling right knee compared to the left with no associated erythema or warmth. No pain with active and passive ROM of the knee. There is mild tenderness to palpation over the lateral aspect of the knee. The RLE is warm and well perfused with strong and symmetrical pedal pulses. No lower extremity swelling or edema.      NEURO: Alert and oriented to person, place and time.  Cranial nerves grossly intact.  No focal motor deficit.  Strength 5/5 BL lower extremities (hip flexion, " knee flexion / extension, plantarflexion / dorsiflexion ankles and great toes) with sensation to light touch grossly intact.   PSYCH: Normal mood and affect.  SKIN: No erythema, RLE.     Results     LAB:  All pertinent labs reviewed and interpreted  Labs Ordered and Resulted from Time of ED Arrival to Time of ED Departure   BASIC METABOLIC PANEL - Abnormal       Result Value    Sodium 141      Potassium 3.9      Chloride 104      Carbon Dioxide (CO2) 25      Anion Gap 12      Urea Nitrogen 19.3      Creatinine 1.17      GFR Estimate 67      Calcium 9.3      Glucose 107 (*)    CBC WITH PLATELETS - Normal    WBC Count 10.7      RBC Count 4.65      Hemoglobin 13.6      Hematocrit 41.8      MCV 90      MCH 29.2      MCHC 32.5      RDW 13.1      Platelet Count 209     INR - Normal    INR 1.12     PARTIAL THROMBOPLASTIN TIME - Normal    aPTT 26     CRP INFLAMMATION - Normal    CRP Inflammation 3.80         RADIOLOGY:  US Lower Extremity Venous Duplex Right   Final Result   IMPRESSION:   1.  No deep venous thrombosis in the right leg.   2.  Bilobed, complex popliteal fossa cyst as noted above. Nonemergent MRI of the knee can evaluate knee.      XR Knee Right 1/2 Views   Final Result   IMPRESSION: Anatomic alignment right knee. No acute displaced right knee fracture. Advanced medial compartment right knee osteoarthritis. Patellofemoral spurring. No sizable right knee joint effusion. Extensor tendon enthesopathy at the patella. Arterial    calcification.            I, rKupa Fitzpatrick, am serving as a scribe to document services personally performed by Bonny Pink MD based on my observation and the provider's statements to me. I, Bonny Pink MD attest that Krupa Fitzpatrick is acting in a scribe capacity, has observed my performance of the services and has documented them in accordance with my direction.    Bonny Pink MD  Emergency Medicine  [facility] EMERGENCY DEPARTMENT          Apryl  Bonny Muñoz MD  10/28/24 1541

## 2024-11-02 ENCOUNTER — TRANSFERRED RECORDS (OUTPATIENT)
Dept: HEALTH INFORMATION MANAGEMENT | Facility: CLINIC | Age: 69
End: 2024-11-02
Payer: COMMERCIAL

## 2024-11-13 NOTE — PROGRESS NOTES
"In person evaluation        HPI  8/30/2023, hospital consultation  9/26/2023, in person visit  1/10/2024, in person visit  7/15/2024, in person visit  11/15/2024, in person visit    69-year-old being evaluated neurologically for:  Myasthenia gravis  B12 deficiency  Chronic kidney disease    Patient with myasthenia gravis currently doing well  We are trying to taper down off the prednisone as he has a lot of compression fractures in his back    Did have lumbar compression fractures with some kyphoplasty done  Since I last saw him they have seen some other \"compression fractures small\"    Mestinon 2 tablets twice a day for 3 days, then 1 tablet twice a day for 3 days, then 1/2 tablet twice a day for 3 days, then 1/2 tablet daily for three days.    Prednisone taper  Prednisone 30 mg every other day (9/9/2024)  Prednisone 20 mg every other day (10/9/2024)  Prednisone 10 mg every other day (11/15/2024)    Discussed as we get to lower doses we may need to go slower as he may have some adrenal insufficiency if we go too fast    Decrease Mestinon half a tablet twice per day patient's choice to decrease further depending on symptoms  Does have some loose stool but no severe diarrhea    Neuroexam stable  Eye closure strong  No diplopia  Mouth closure strong  No shortness of breath  Counts up to 25 slowly with 1 breath without any difficulty  Talks and paragraphs    Proximal muscle strength upper and lower extremities good      Patient has had difficulty with L2/L3/L4 compression fractures    Patient will continue on the CellCept                A.  Myasthenia gravis        Myasthenia gravis crises 8/30/2023        ER 8/30/2023 with increasing symptoms from his myasthenia gravis.        Has had the diagnosis for at least 2 years followed at the [facility]         Recently seen at the [facility] day before admission has been on prednisone 60 mg for couple of days.           Patient had increasing fatigue during the month of " "August has seen his primary regard to this.           9/26/2023.  No shortness of breath  Talks and paragraphs  Some difficulty with swallowing in the morning but okay once he starts his meds  No significant diplopia  A little bit of left eyelid ptosis but not as bad as before  No proximal muscle weakness  Complains of feeling \"tired\"           Able to talk in long sentences         Able to swallow okay at bedside    B.  CKD 3 a, secondary to IgA nephropathy        Followed by Dr. Mcfarlane of [facility] [place] ([phone] for kidney disease)        CKD 3a, Ig A nephropathy.         Kidney biopsy done in August 2018 with IgA nephropathy with moderate interstitial fibrosis and tubular atrophy     Patient states that he is kidney doctor was okay with IV IgG and the osmotic load if necessary in the future.      C   B12 deficiency diagnosed by primary MD        B12 174,  (10/18/2023)         Primary MD was starting B12 replacement            Past neurologic history  Onset of ptosis and diplopia possibly back in August 2021  Diagnosed at the [facility] with what sounds like predominantly ocular myasthenia gravis  Treated with Mestinon and no immunosuppression until just recently August 2023 August 2023 developed fatigue/some air hunger, increased ptosis increased diplopia some trouble with swallowing.  With these symptoms he has moved to more generalized myasthenia gravis    He was started on steroids fairly recently in August and ramped up fairly quickly  In some patients they can get slight worsening of their myasthenia gravis before it improves with a quick increase in prednisone.    Recommend short course of plasma exchange to bring things under control August 30, 2023 when he presented to the ER now stabilized        Past medical history  Myasthenia gravis (November 2021)  CKD IgA nephropathy  Hypertension  Hypothyroidism  Prostatism/prostate cancer  Anemia  Psoriasis  Kidney " stone  Adenomatous polyp    Habits  Past smoker quit  Does not drink alcohol    Family history  Father colon cancer  Mother heart disease/hypertension  Maternal aunt heart disease  Sister hypertension      Work-up           8/30/2023  NIF       -40  VC       2.38  CT scan chest 10/30/2023  No thymic mass or other acute cardiopulmonary abnormality.   B12 174,  (10/18/2023)     Laboratory data review                                  7/2024              10/2024  NA/K                        138/4.0              141/3.9  BUN/Cr                     29/1.24             19.3/1.17  GLU                          78                     107  Wbc/hgb                  11.8/12.3           10.7/13.6  Plts                           187,000            29,000  B12                                                   1559  TSH                                                   0.27      Exam  Review of systems  Pertinent positives and negatives  No diplopia  No dysarthria  No dysphagia  No shortness of breath talks in paragraphs  Counts to 25 with 1 breath counting slowly  No proximal weakness in the arms or legs  Gait okay    Does have compression fractures but those are healing  Otherwise review of systems negative      General exam  Blood pressure 134/79, pulse 67  Alert orient x3  HEENT no ptosis even with extended eye elevation  Lungs clear  Heart rate regular  Abdomen soft  Symmetrical pulses  No edema in the feet        Neurologic exam  Alert orient x3  Normal prosody of speech  Normal naming  Normal comprehension  Normal repetition  No aphasia  No neglect  Memory recall okay    Cranials 2 through 12  No ophthalmoplegia  No ptosis with extended eye elevation today  Eye closure strong  Mouth closure strong  Visual fields intact  Face symmetrical  Tongue twisters good    With 1 breath can count slowly up to 25    Neck flexors strong  Neck extensors strong    Upper extremities reported right over left  Deltoid 5/5  Biceps  5/5  Triceps 5/5  Wrist/finger extensors 5/5  Wrist/finger flexors 5/5  Intrinsic hand strength 5/5    No drift no tremor normal rapid alternating movements    Lower extremities reported right over left  Iliopsoas 5/5  Quadriceps 5/5  Hamstring 5/5  Anterior tibial 5/5    Gait  Able to stand up without difficulty marches in place   Fairly quick gait even with back brace        Assessment/plan    Myasthenia gravis exacerbation       Onset November 2021/diagnosis at the [facility]    2.  CKD stage III with IgA nephropathy with moderate interstitial fibrosis and tubular atrophy on previous biopsy       Concern with above renal disease patient not a good candidate for IV IgG.    3.   B12 deficiency diagnosed by primary MD        B12 174,  (10/18/2023)        Primary MD was starting B12 replacement    4.  Compression fractures L3/L4/L5       Follows with [place] specialist and kyphoplasty       Seeing specialist for treatment of osteoporosis       We are trying to taper down on the prednisone    Diagnosis  Myasthenia gravis exacerbation  CKD stage III with IgA nephropathy  B12 deficiency (diagnosed 10/18/2023)      Previously discussed other treatment options  Rediscussed the diagnosis and treatment risks and benefits of various treatments as above  Also talked about Rituxan and other treatments should they become necessary  Talked about the more generalized nature of his myasthenia gravis at this time    Patient's nephrologist said that IV IgG would be okay osmotic load would be okay for the kidney  We would stay for this for if he has a significant exacerbation    CT scan chest negative for thymic tumor or mass October 2023  CT scan of the chest rule out thymic tumor    CellCept 750 mg twice daily (started September 2023)    Mestinon 60 mg tablet  Patient slowly decreasing will be on a half twice daily and try to cut out another dose if tolerated and can taper off if he feels that symptoms are  good    Prednisone taper  Prednisone 30 mg every other day (9/9/2024)  Prednisone 20 mg every other day (10/9/2024)  Prednisone 10 mg every other day (11/15/2024)    Patient will call in about 6 weeks and if doing well we will try to go down to 5 mg every other day  Then we may have to switch to a different size tablet in 6 weeks to go down to 2.5 mg every other day    We may have to taper slowly as he gets down to lower doses to avoid adrenal insufficiency which I did discuss with him    Multiple issues as above discussed  Follow-up July 2025  He will keep in touch by phone to slowly taper down the prednisone    Has multiple surgeries planned for the future  Cataract surgery  Right knee surgery  Follows with other specialist for his compression fractures and osteoporosis    Total care time today 40 minutes, complex neurologic condition with medication side effects with adjustment in dosing.  The longitudinal plan of care for the diagnosis(es)/condition(s) as documented were addressed during this visit. Due to the added complexity in care, I will continue to support Maurice in the subsequent management and with ongoing continuity of care.      As part of visit today  Reviewed laboratory data  Reviewed EMR notes  Reviewed medication changes since last seen

## 2024-11-15 ENCOUNTER — OFFICE VISIT (OUTPATIENT)
Dept: NEUROLOGY | Facility: CLINIC | Age: 69
End: 2024-11-15
Payer: COMMERCIAL

## 2024-11-15 VITALS
DIASTOLIC BLOOD PRESSURE: 79 MMHG | HEART RATE: 67 BPM | BODY MASS INDEX: 27.43 KG/M2 | WEIGHT: 181 LBS | SYSTOLIC BLOOD PRESSURE: 134 MMHG | HEIGHT: 68 IN

## 2024-11-15 DIAGNOSIS — I10 PRIMARY HYPERTENSION: ICD-10-CM

## 2024-11-15 DIAGNOSIS — G70.00 MYASTHENIA GRAVIS (H): Primary | ICD-10-CM

## 2024-11-15 RX ORDER — PREDNISONE 10 MG/1
TABLET ORAL
Qty: 45 TABLET | Refills: 3 | Status: SHIPPED | OUTPATIENT
Start: 2024-11-15

## 2024-11-15 RX ORDER — LOSARTAN POTASSIUM 25 MG/1
25 TABLET ORAL DAILY
COMMUNITY

## 2024-11-15 NOTE — LETTER
"11/15/2024      Maurice Biswas  [address] [facility] [address]      Dear Colleague,    Thank you for referring your patient, Maurice Biswas, to the [facility] [place]. Please see a copy of my visit note below.    In person evaluation        HPI  8/30/2023, hospital consultation  9/26/2023, in person visit  1/10/2024, in person visit  7/15/2024, in person visit  11/15/2024, in person visit    69-year-old being evaluated neurologically for:  Myasthenia gravis  B12 deficiency  Chronic kidney disease    Patient with myasthenia gravis currently doing well  We are trying to taper down off the prednisone as he has a lot of compression fractures in his back    Did have lumbar compression fractures with some kyphoplasty done  Since I last saw him they have seen some other \"compression fractures small\"    Mestinon 2 tablets twice a day for 3 days, then 1 tablet twice a day for 3 days, then 1/2 tablet twice a day for 3 days, then 1/2 tablet daily for three days.    Prednisone taper  Prednisone 30 mg every other day (9/9/2024)  Prednisone 20 mg every other day (10/9/2024)  Prednisone 10 mg every other day (11/15/2024)    Discussed as we get to lower doses we may need to go slower as he may have some adrenal insufficiency if we go too fast    Decrease Mestinon half a tablet twice per day patient's choice to decrease further depending on symptoms  Does have some loose stool but no severe diarrhea    Neuroexam stable  Eye closure strong  No diplopia  Mouth closure strong  No shortness of breath  Counts up to 25 slowly with 1 breath without any difficulty  Talks and paragraphs    Proximal muscle strength upper and lower extremities good      Patient has had difficulty with L2/L3/L4 compression fractures    Patient will continue on the CellCept                A.  Myasthenia gravis        Myasthenia gravis crises 8/30/2023        ER 8/30/2023 with increasing symptoms from his myasthenia gravis.        " "Has had the diagnosis for at [facility]         [facility] has been on prednisone 60 mg for couple of days.           Patient had increasing fatigue during the month of August has seen his primary regard to this.           9/26/2023.  No shortness of breath  Talks and paragraphs  Some difficulty with swallowing in the morning but okay once he starts his meds  No significant diplopia  A little bit of left eyelid ptosis but not as bad as before  No proximal muscle weakness  Complains of feeling \"tired\"           Able to talk in long sentences         Able to swallow okay at bedside    B.  CKD 3 a, secondary to IgA nephropathy        Followed by Dr. Mcfarlane of [facility] [place] ([phone] for kidney disease)        CKD 3a, Ig A nephropathy.         Kidney biopsy done in August 2018 with IgA nephropathy with moderate interstitial fibrosis and tubular atrophy     Patient states that he is kidney doctor was okay with IV IgG and the osmotic load if necessary in the future.      C   B12 deficiency diagnosed by primary MD        B12 174,  (10/18/2023)         Primary MD was starting B12 replacement            Past neurologic history  Onset of ptosis and diplopia possibly back in August 2021  Diagnosed at the [facility] with what sounds like predominantly ocular myasthenia gravis  Treated with Mestinon and no immunosuppression until just recently August 2023 August 2023 developed fatigue/some air hunger, increased ptosis increased diplopia some trouble with swallowing.  With these symptoms he has moved to more generalized myasthenia gravis    He was started on steroids fairly recently in August and ramped up fairly quickly  In some patients they can get slight worsening of their myasthenia gravis before it improves with a quick increase in prednisone.    Recommend short course of plasma exchange to bring things under " control August 30, 2023 when he presented to the ER now stabilized        Past medical history  Myasthenia gravis (November 2021)  CKD IgA nephropathy  Hypertension  Hypothyroidism  Prostatism/prostate cancer  Anemia  Psoriasis  Kidney stone  Adenomatous polyp    Habits  Past smoker quit  Does not drink alcohol    Family history  Father colon cancer  Mother heart disease/hypertension  Maternal aunt heart disease  Sister hypertension      Work-up           8/30/2023  NIF       -40  VC       2.38  CT scan chest 10/30/2023  No thymic mass or other acute cardiopulmonary abnormality.   B12 174,  (10/18/2023)     Laboratory data review                                  7/2024              10/2024  NA/K                        138/4.0              141/3.9  BUN/Cr                     29/1.24             19.3/1.17  GLU                          78                     107  Wbc/hgb                  11.8/12.3           10.7/13.6  Plts                           187,000            29,000  B12                                                   1559  TSH                                                   0.27      Exam  Review of systems  Pertinent positives and negatives  No diplopia  No dysarthria  No dysphagia  No shortness of breath talks in paragraphs  Counts to 25 with 1 breath counting slowly  No proximal weakness in the arms or legs  Gait okay    Does have compression fractures but those are healing  Otherwise review of systems negative      General exam  Blood pressure 134/79, pulse 67  Alert orient x3  HEENT no ptosis even with extended eye elevation  Lungs clear  Heart rate regular  Abdomen soft  Symmetrical pulses  No edema in the feet        Neurologic exam  Alert orient x3  Normal prosody of speech  Normal naming  Normal comprehension  Normal repetition  No aphasia  No neglect  Memory recall okay    Cranials 2 through 12  No ophthalmoplegia  No ptosis with extended eye elevation today  Eye closure strong  Mouth  closure strong  Visual fields intact  Face symmetrical  Tongue twisters good    With 1 breath can count slowly up to 25    Neck flexors strong  Neck extensors strong    Upper extremities reported right over left  Deltoid 5/5  Biceps 5/5  Triceps 5/5  Wrist/finger extensors 5/5  Wrist/finger flexors 5/5  Intrinsic hand strength 5/5    No drift no tremor normal rapid alternating movements    Lower extremities reported right over left  Iliopsoas 5/5  Quadriceps 5/5  Hamstring 5/5  Anterior tibial 5/5    Gait  Able to stand up without difficulty marches in place   Fairly quick gait even with back brace        Assessment/plan    Myasthenia gravis exacerbation       Onset November 2021/diagnosis at the [facility]    2.  CKD stage III with IgA nephropathy with moderate interstitial fibrosis and tubular atrophy on previous biopsy       Concern with above renal disease patient not a good candidate for IV IgG.    3.   B12 deficiency diagnosed by primary MD        B12 174,  (10/18/2023)        Primary MD was starting B12 replacement    4.  Compression fractures L3/L4/L5       Follows with [place] specialist and kyphoplasty       Seeing specialist for treatment of osteoporosis       We are trying to taper down on the prednisone    Diagnosis  Myasthenia gravis exacerbation  CKD stage III with IgA nephropathy  B12 deficiency (diagnosed 10/18/2023)      Previously discussed other treatment options  Rediscussed the diagnosis and treatment risks and benefits of various treatments as above  Also talked about Rituxan and other treatments should they become necessary  Talked about the more generalized nature of his myasthenia gravis at this time    Patient's nephrologist said that IV IgG would be okay osmotic load would be okay for the kidney  We would stay for this for if he has a significant exacerbation    CT scan chest negative for thymic tumor or mass October 2023  CT scan of the chest rule out thymic tumor    CellCept 750  mg twice daily (started September 2023)    Mestinon 60 mg tablet  Patient slowly decreasing will be on a half twice daily and try to cut out another dose if tolerated and can taper off if he feels that symptoms are good    Prednisone taper  Prednisone 30 mg every other day (9/9/2024)  Prednisone 20 mg every other day (10/9/2024)  Prednisone 10 mg every other day (11/15/2024)    Patient will call in about 6 weeks and if doing well we will try to go down to 5 mg every other day  Then we may have to switch to a different size tablet in 6 weeks to go down to 2.5 mg every other day    We may have to taper slowly as he gets down to lower doses to avoid adrenal insufficiency which I did discuss with him    Multiple issues as above discussed  Follow-up July 2025  He will keep in touch by phone to slowly taper down the prednisone    Has multiple surgeries planned for the future  Cataract surgery  Right knee surgery  Follows with other specialist for his compression fractures and osteoporosis    Total care time today 40 minutes, complex neurologic condition with medication side effects with adjustment in dosing.  The longitudinal plan of care for the diagnosis(es)/condition(s) as documented were addressed during this visit. Due to the added complexity in care, I will continue to support Maurice in the subsequent management and with ongoing continuity of care.      As part of visit today  Reviewed laboratory data  Reviewed EMR notes  Reviewed medication changes since last seen        Again, thank you for allowing me to participate in the care of your patient.        Sincerely,        leola Alamo MD

## 2024-11-15 NOTE — NURSING NOTE
Chief Complaint   Patient presents with    Myasthenia Gravis     4 month follow up. Pt states he is doing well. Denies any new concerns.     Aurora Lorenzo LPN on 11/15/2024 at 2:38 PM

## 2025-01-02 ENCOUNTER — TELEPHONE (OUTPATIENT)
Dept: NEUROLOGY | Facility: CLINIC | Age: 70
End: 2025-01-02
Payer: COMMERCIAL

## 2025-01-02 DIAGNOSIS — G70.00 MYASTHENIA GRAVIS (H): ICD-10-CM

## 2025-01-02 NOTE — TELEPHONE ENCOUNTER
[facility] Call Center    Phone Message    May a detailed message be left on voicemail: yes     Reason for Call: Medication Question or concern regarding medication   Prescription Clarification  Name of Medication: predniSONE (DELTASONE)    pyRIDostigmine (MESTINON)   Prescribing Provider: Leonel Alamo MD   Pharmacy:    What on the order needs clarification? Pt would like to discuss making changes to the dosages.      Action Taken: Message routed to:  Other: [facility]    Travel Screening: Not Applicable     Date of Service:

## 2025-01-02 NOTE — TELEPHONE ENCOUNTER
Returned call to Maurice. Per LOV 11/15, patient started prednisone and mestinon taper. He states he is currently taking prednisone 10 mg every other day. He is also taking mestinon 1/2 tablet on days he is taking prednisone, and 1/2 tablet BID on days that he does not take prednisone. He is denying any side effects or issues since beginning the taper and would like Dr. Alamo' further input on decreasing dosages since it has been six weeks on this schedule.     Kady HAGEN RN, BSN  [facility] Neurology

## 2025-01-03 NOTE — TELEPHONE ENCOUNTER
Attempted to call patient to relay Dr. Alamo message. [facility].    Will also send patient a [employer] message relaying information.     Angela NICK RN, BSN  [facility] Neurology

## 2025-01-03 NOTE — TELEPHONE ENCOUNTER
Patient could decrease prednisone 10 mg tablet, to  0.5 tab every other day.  Should continue this for at least 3+ months and if stable then can call and we could make further adjustments.

## 2025-01-06 RX ORDER — PREDNISONE 10 MG/1
5 TABLET ORAL EVERY OTHER DAY
Qty: 23 TABLET | Refills: 5 | Status: SHIPPED | OUTPATIENT
Start: 2025-01-06

## 2025-01-06 NOTE — TELEPHONE ENCOUNTER
Patient responded via mychart encounter. Agreeable to prednisone 5mg every other day and will continue current dose of pyridastigmine.     Rx pended below for provider signature, reflects decreased dose of prednisone with updated instructions.     Deshawn GUAMAN RN, BSN  [facility]

## 2025-03-15 ENCOUNTER — HEALTH MAINTENANCE LETTER (OUTPATIENT)
Age: 70
End: 2025-03-15

## 2025-03-31 ENCOUNTER — TELEPHONE (OUTPATIENT)
Dept: NEUROLOGY | Facility: CLINIC | Age: 70
End: 2025-03-31
Payer: COMMERCIAL

## 2025-03-31 NOTE — TELEPHONE ENCOUNTER
As per patient's note he is off the Mestinon that is okay I guess at this time if he is doing well.  In regards to the prednisone per note,  January 2025 decreased to 5 mg every other day.  If doing well we could have the patient go to 5 mg Monday/Thursday/Saturday and see if things stay stable.  Would like him on this dose for at least 2 months to make sure that he is stable before going down further  This is not so much for his myasthenia gravis but to make sure he does not have any adrenal insufficiency.  Please let patient know  leola Alamo MD on 3/31/2025 at 4:29 PM

## 2025-03-31 NOTE — TELEPHONE ENCOUNTER
See message below. Provider approved reduction of prednisone to 5mg every other day in January but advised that mestinon dosing remain unchanged.     Pt self discontinued mestinon, tapering as detailed in message below and reports that he feels good, no changes in condition.     Asking to discontinue prednisone. Please advise regarding mestinon discontinuation and request to discontinue prednisone.     Deshawn GUAMAN RN, BSN  [facility] Neurology

## 2025-03-31 NOTE — TELEPHONE ENCOUNTER
[facility] Call Center    Phone Message    May a detailed message be left on voicemail: yes     Reason for Call: Other:       Pt calling in to give an update on his pyRIDostigmine (MESTINON) 60 MG tablet and predniSONE (DELTASONE) 10 MG tablet.   1.Pt reports he decreased his predniSONE (DELTASONE) 10 MG tablet on January 7th and is now taking 5 MG only on odd days and feels great with no issues.Pt is requesting to stop taking the medication to see how he feels but would like Leonel Pimentel opinion on stopping the medication.  2. Pt states that he has been taking pyRIDostigmine (MESTINON) 60 MG tablet every day and then he reduced it to a pill every other day and then a half of a pill and now he has not taken any for the last two weeks and feels great.     Pt is requesting call back to discuss. Pt call back # [phone]      Action Taken: Message routed to:  Other: [facility]    Travel Screening: Not Applicable

## 2025-04-01 NOTE — TELEPHONE ENCOUNTER
ANNA lettalize Richards know that Dr. Alamo doesn't have any further recommendations at this time, and to keep his scheduled follow-up appointment on 7/16.    Joan Limon RN, BSN  [facility] Neurology

## 2025-04-01 NOTE — TELEPHONE ENCOUNTER
Swelling is more likely from the prednisone, do not want to change further treatment at this time continue with plan that we just gave keep follow-up as planned.  leola Alamo MD on 4/1/2025 at 11:55 AM

## 2025-04-01 NOTE — TELEPHONE ENCOUNTER
Called pt and relayed Dr. Alamo message- see below.  He verbalized understanding and will decrease the prednisone.     He did notice after decreasing the prednisone, that he started having edema in BLE. He read that this could be side effect of mycophenolate.     He got a new bed and sleeps with feet elevated, and edema is better in morning, but increases throughout the day.     Denies and SOB, dyspnea, color changes or pain.     Advised elevating legs when sitting, compression socks and reducing salt intake, and if sx increase or new sx arise to call clinic.     Pt verbalized understanding and had no further questions at this time.  Advised can call back to clinic at any time with questions or concerns.     Theresa RN, BSN  [facility] Neurology

## 2025-04-05 ENCOUNTER — HEALTH MAINTENANCE LETTER (OUTPATIENT)
Age: 70
End: 2025-04-05

## 2025-04-14 ENCOUNTER — OFFICE VISIT (OUTPATIENT)
Dept: PODIATRY | Facility: CLINIC | Age: 70
End: 2025-04-14
Payer: COMMERCIAL

## 2025-04-14 DIAGNOSIS — M20.42 HAMMERTOE, BILATERAL: ICD-10-CM

## 2025-04-14 DIAGNOSIS — G60.9 IDIOPATHIC NEUROPATHY: Primary | ICD-10-CM

## 2025-04-14 DIAGNOSIS — M20.41 HAMMERTOE, BILATERAL: ICD-10-CM

## 2025-04-14 PROCEDURE — 99203 OFFICE O/P NEW LOW 30 MIN: CPT | Performed by: PODIATRIST

## 2025-04-14 NOTE — LETTER
4/14/2025      Maurice Biswas  [address] [address]      Dear Colleague,    Thank you for referring your patient, Maurice Biswas, to the [facility]. Please see a copy of my visit note below.          FOOT AND ANKLE SURGERY/PODIATRY CONSULT NOTE         ASSESSMENT:   Idiopathic peripheral neuropathy  Hammertoes          TREATMENT:  - I discussed with the patient that on exam today there is no pain to palpation.  Overall foot structure appears to be very good with only hammertoes on exam today.    -I reviewed with the patient that his symptoms of tingling discomfort at night is likely due to idiopathic peripheral neuropathy.  I have recommended that he follow-up with his neurologist for possible EMG.    -We discussed treatment options including gabapentin which she has tried in the past and has had side effect of swelling in his feet.  We also discussed possible use of Lyrica.  I will defer to his neurologist for further recommendations.    -Patient's questions invited and answered.  He was encouraged to call my office with any further questions or concerns.     Harjinder Yancey DPM  [facility] Podiatry/Foot & Ankle Surgery      HPI: I was asked to see Maurice Biswas today for bilateral foot pain.  Patient describes having a tingling sensation at night which is uncomfortable and has progressed over the past several weeks.  He denies any trauma or discomfort with ambulation.  Past medical history significant for myasthenia gravis.  Patient has a neurologist that he has seen in the recent past and is also tried gabapentin for lower back pain which caused his feet to swell.    History reviewed. No pertinent past medical history.      Social History     Socioeconomic History     Marital status:      Spouse name: Not on file     Number of children: Not on file     Years of education: Not on file     Highest education level: Not on file   Occupational History     Not on  file   Tobacco Use     Smoking status: Never     Smokeless tobacco: Never   Substance and Sexual Activity     Alcohol use: No     Drug use: No     Sexual activity: Yes     Partners: Female   Other Topics Concern     Not on file   Social History Narrative     Not on file     Social Drivers of Health     Financial Resource Strain: Low Risk  (12/3/2024)    [facility]    Financial Resource Strain      Difficulty of Paying Living Expenses: 3      Difficulty of Paying Living Expenses: Not on file   Food Insecurity: No Food Insecurity (12/3/2024)    Received from [facility]    Food Insecurity      Do you worry your food will run out before you are able to buy more?: 1   Transportation Needs: No Transportation Needs (12/3/2024)    [facility]    Transportation Needs      Does lack of transportation keep you from medical appointments?: 1      Does lack of transportation keep you from work, meetings or getting things that you need?: 1   Physical Activity: Not on file   Stress: Not on file   Social Connections: Socially Integrated (12/3/2024)    Received from [facility]    Social Connections      Do you often feel lonely or isolated from those around you?: 0   Interpersonal Safety: Not on file   Housing Stability: Low Risk  (12/3/2024)    [facility]    Housing Stability      What is your housing situation today?: 1            Allergies   Allergen Reactions     Doxycycline Muscle Pain (Myalgia) and Other (See Comments)     Brought on Myasthenia Gravis         MEDICATIONS:   Current Outpatient Medications   Medication Sig Dispense Refill     amLODIPine (NORVASC) 10 MG tablet Take 10 mg by mouth every morning       calcium carbonate (OS-SARAH) 500 MG tablet Take 1 tablet by mouth daily       calcium citrate (CITRACAL) 950  (200 Ca) MG tablet Take 1 tablet by mouth daily       fish oil-omega-3 fatty acids 1000 MG capsule Take 2 capsules by mouth 2 times daily       HYDROmorphone (DILAUDID) 2 MG tablet Take 1 tablet (2 mg) by mouth every 8 hours as needed for severe pain       levothyroxine (SYNTHROID/LEVOTHROID) 112 MCG tablet Take 112 mcg by mouth daily       losartan (COZAAR) 25 MG tablet Take 25 mg by mouth daily.       mycophenolate (GENERIC EQUIVALENT) 500 MG tablet Take 1.5 tablets (750 mg) by mouth 2 times daily 90 tablet 11     predniSONE (DELTASONE) 10 MG tablet Take 0.5 tablets (5 mg) by mouth every other day. 23 tablet 5     Vitamin D-Vitamin K (VITAMIN K2-VITAMIN D3 PO) Take 1 tablet by mouth every morning       pyRIDostigmine (MESTINON) 60 MG tablet Take 1 tablet (60 mg) by mouth 2 times daily (Patient not taking: Reported on 4/14/2025) 60 tablet 11     No current facility-administered medications for this visit.        History reviewed. No pertinent family history.       Review of Systems - 10 point Review of Systems is negative except for bilateral foot pain which is noted in HPI.    OBJECTIVE:  Appearance: alert, well appearing, and in no distress.    VITAL SIGNS: There were no vitals taken for this visit.      General appearance: Patient is alert and fully cooperative with history & exam.  No sign of distress is noted during the visit.     Psychiatric: Affect is pleasant & appropriate.  Patient appears motivated to improve health.     Respiratory: Breathing is regular & unlabored while sitting.     HEENT: Hearing is intact to spoken word.  Speech is clear.  No gross evidence of visual impairment that would impact ambulation.      Vascular: Dorsalis pedis and posterior tibial pulses are palpable. There is pedal hair growth bilateral.  CFT < 3 sec from anterior tibial surface to distal digits bilateral. There is no appreciable edema noted.  Dermatologic: Turgor and texture are within normal limits. No coloration or  temperature changes. No primary or secondary lesions noted.  Neurologic: All epicritic and proprioceptive sensations are grossly intact bilateral.  Musculoskeletal: Contracted digits bilateral.         Again, thank you for allowing me to participate in the care of your patient.        Sincerely,        Harjinder Yancey DPM    Electronically signed

## 2025-04-14 NOTE — PROGRESS NOTES
FOOT AND ANKLE SURGERY/PODIATRY CONSULT NOTE         ASSESSMENT:   Idiopathic peripheral neuropathy  Hammertoes          TREATMENT:  - I discussed with the patient that on exam today there is no pain to palpation.  Overall foot structure appears to be very good with only hammertoes on exam today.    -I reviewed with the patient that his symptoms of tingling discomfort at night is likely due to idiopathic peripheral neuropathy.  I have recommended that he follow-up with his neurologist for possible EMG.    -We discussed treatment options including gabapentin which she has tried in the past and has had side effect of swelling in his feet.  We also discussed possible use of Lyrica.  I will defer to his neurologist for further recommendations.    -Patient's questions invited and answered.  He was encouraged to call my office with any further questions or concerns.     Harjinder Yancey DPM  [facility] Podiat[facility]/Foot & Ankle Surgery      HPI: I was asked to see Maurice Biswas today for bilateral foot pain.  Patient describes having a tingling sensation at night which is uncomfortable and has progressed over the past several weeks.  He denies any trauma or discomfort with ambulation.  Past medical history significant for myasthenia gravis.  Patient has a neurologist that he has seen in the recent past and is also tried gabapentin for lower back pain which caused his feet to swell.    History reviewed. No pertinent past medical history.      Social History     Socioeconomic History    Marital status:      Spouse name: Not on file    Number of children: Not on file    Years of education: Not on file    Highest education level: Not on file   Occupational History    Not on file   Tobacco Use    Smoking status: Never    Smokeless tobacco: Never   Substance and Sexual Activity    Alcohol use: No    Drug use: No    Sexual activity: Yes     Partners: Female   Other Topics Concern    Not on file   Social History  Narrative    Not on file     Social Drivers of Health     Financial Resource Strain: Low Risk  (12/3/2024)    [facility]    Financial Resource Strain     Difficulty of Paying Living Expenses: 3     Difficulty of Paying Living Expenses: Not on file   Food Insecurity: No Food Insecurity (12/3/2024)    Received from [facility]    Food Insecurity     Do you worry your food will run out before you are able to buy more?: 1   Transportation Needs: No Transportation Needs (12/3/2024)    Received from [facility]    Transportation Needs     Does lack of transportation keep you from medical appointments?: 1     Does lack of transportation keep you from work, meetings or getting things that you need?: 1   Physical Activity: Not on file   Stress: Not on file   Social Connections: Socially Integrated (12/3/2024)    Received from [facility]    Social Connections     Do you often feel lonely or isolated from those around you?: 0   Interpersonal Safety: Not on file   Housing Stability: Low Risk  (12/3/2024)    [facility]    Housing Stability     What is your housing situation today?: 1            Allergies   Allergen Reactions    Doxycycline Muscle Pain (Myalgia) and Other (See Comments)     Brought on Myasthenia Gravis         MEDICATIONS:   Current Outpatient Medications   Medication Sig Dispense Refill    amLODIPine (NORVASC) 10 MG tablet Take 10 mg by mouth every morning      calcium carbonate (OS-SARAH) 500 MG tablet Take 1 tablet by mouth daily      calcium citrate (CITRACAL) 950 (200 Ca) MG tablet Take 1 tablet by mouth daily      fish oil-omega-3 fatty acids 1000 MG capsule Take 2 capsules by mouth 2 times daily      HYDROmorphone (DILAUDID) 2 MG tablet Take 1 tablet (2 mg) by mouth every 8 hours as needed for severe pain       levothyroxine (SYNTHROID/LEVOTHROID) 112 MCG tablet Take 112 mcg by mouth daily      losartan (COZAAR) 25 MG tablet Take 25 mg by mouth daily.      mycophenolate (GENERIC EQUIVALENT) 500 MG tablet Take 1.5 tablets (750 mg) by mouth 2 times daily 90 tablet 11    predniSONE (DELTASONE) 10 MG tablet Take 0.5 tablets (5 mg) by mouth every other day. 23 tablet 5    Vitamin D-Vitamin K (VITAMIN K2-VITAMIN D3 PO) Take 1 tablet by mouth every morning      pyRIDostigmine (MESTINON) 60 MG tablet Take 1 tablet (60 mg) by mouth 2 times daily (Patient not taking: Reported on 4/14/2025) 60 tablet 11     No current facility-administered medications for this visit.        History reviewed. No pertinent family history.       Review of Systems - 10 point Review of Systems is negative except for bilateral foot pain which is noted in HPI.    OBJECTIVE:  Appearance: alert, well appearing, and in no distress.    VITAL SIGNS: There were no vitals taken for this visit.      General appearance: Patient is alert and fully cooperative with history & exam.  No sign of distress is noted during the visit.     Psychiatric: Affect is pleasant & appropriate.  Patient appears motivated to improve health.     Respiratory: Breathing is regular & unlabored while sitting.     HEENT: Hearing is intact to spoken word.  Speech is clear.  No gross evidence of visual impairment that would impact ambulation.      Vascular: Dorsalis pedis and posterior tibial pulses are palpable. There is pedal hair growth bilateral.  CFT < 3 sec from anterior tibial surface to distal digits bilateral. There is no appreciable edema noted.  Dermatologic: Turgor and texture are within normal limits. No coloration or temperature changes. No primary or secondary lesions noted.  Neurologic: All epicritic and proprioceptive sensations are grossly intact bilateral.  Musculoskeletal: Contracted digits bilateral.

## 2025-05-06 ENCOUNTER — TELEPHONE (OUTPATIENT)
Dept: NEUROLOGY | Facility: CLINIC | Age: 70
End: 2025-05-06
Payer: COMMERCIAL

## 2025-05-06 DIAGNOSIS — G62.9 NEUROPATHY: Primary | ICD-10-CM

## 2025-05-06 NOTE — TELEPHONE ENCOUNTER
[facility] Call Center    Phone Message    May a detailed message be left on voicemail: yes     Reason for Call: Order(s): Other:   Reason for requested: testing for his leg pain  Date needed: asap, would like to have completed prior to his appointment on 716/25.  Provider name: Dr. Jasmeet Richards stated that he was informed by his podiatrist that he has neuropathy and he would need to request any testing from his neurologist.    Action Taken: Message routed to:  Other: [facility]    Travel Screening: Not Applicable     Date of Service:

## 2025-05-06 NOTE — TELEPHONE ENCOUNTER
See if patient can be set up for EMG of 1 leg in 1 arm.  As I usually like to compare the arm to the leg when diagnosing neuropathy  Patient is also had past compression fractures L2/L3/L4 which could cause radiculopathy  Leg which should be tested should be the one that has the worst symptoms.  Of note patient did have B12 deficiency that was significant which can also cause neuropathy should be on B12 replacement already.

## 2025-05-06 NOTE — TELEPHONE ENCOUNTER
Patient reports that his podiatrist suspects that he has neuropathy due to numbness in feet. He has never addressed this during past neurological appointments, you follow for MG.     He is asking if any testing can be ordered now prior to July appt?     Deshawn GUAMAN RN, BSN  [facility]rology

## 2025-05-07 NOTE — TELEPHONE ENCOUNTER
Spoke to pt, appt moved to 8/11/25 to accommodate EMG prior and then see Dr. Alamo after.  Will watch for EMG cancellations to get pt seen sooner if able.    Aurora Lorenzo LPN on 5/7/2025 at 3:52 PM

## 2025-06-04 ENCOUNTER — TELEPHONE (OUTPATIENT)
Dept: NEUROLOGY | Facility: CLINIC | Age: 70
End: 2025-06-04
Payer: COMMERCIAL

## 2025-06-04 DIAGNOSIS — G70.00 MYASTHENIA GRAVIS (H): Primary | ICD-10-CM

## 2025-06-04 RX ORDER — PREDNISONE 1 MG/1
2 TABLET ORAL EVERY OTHER DAY
Qty: 30 TABLET | Refills: 11 | Status: SHIPPED | OUTPATIENT
Start: 2025-06-04

## 2025-06-04 RX ORDER — MYCOPHENOLATE MOFETIL 500 MG/1
750 TABLET ORAL 2 TIMES DAILY
Qty: 270 TABLET | Refills: 3 | Status: SHIPPED | OUTPATIENT
Start: 2025-06-04

## 2025-06-04 NOTE — TELEPHONE ENCOUNTER
I sent in prescription for prednisone 1 mg tablet, patient should reduce prednisone dose to 2 mg every other day.  Please let patient know thank you  leola Alamo MD on 6/4/2025 at 3:12 PM

## 2025-06-04 NOTE — TELEPHONE ENCOUNTER
[facility] Call Center    Phone Message    May a detailed message be left on voicemail: yes     Reason for Call: Other:        Pt requesting call back to discuss reducing his predniSONE (DELTASONE) 10 MG tablet. Pt states he is currently on 5 MG a day and has been feeling great. Pt would like to discuss if reducing it further would be an option. Pt also would like to know if he can get a 90 day supply of the medication vs a 30 day.    Pt's call back # [phone]    Action Taken: Other: [facility]    Travel Screening: Not Applicable

## 2025-06-04 NOTE — TELEPHONE ENCOUNTER
RN called Maurice and relayed the following from Dr. Alamo:    I sent in prescription for prednisone 1 mg tablet, patient should reduce prednisone dose to 2 mg every other day.     Maurice expressed understanding.    Joan Limon RN, BSN  [facility]

## 2025-06-04 NOTE — TELEPHONE ENCOUNTER
"RN returned call to Maurice.    Per chart review, Maurice is prescribed 5 mg prednisone every other day and CellCept 1.5 tablets (750 mg) BID. Maurice is trying to taper down the prednisone d/t compression fx's in the lumbar spine.    Maurice informed RN that he's doing really good with the current dose of prednisone and wants to know if Dr. Alamo wants to taper the prednisone down further. Denies any issues with breathing or swallowing, telling RN \"I feel great, no issues.\"    Maurice also told RN he'd like the CellCept sent to his pharmacy as a 90 day supply.    Signed Prescriptions:                        Disp   Refills    mycophenolate (GENERIC EQUIVALENT) 500 MG *270 ta*3        Sig: Take 1.5 tablets (750 mg) by mouth 2 times daily.  Authorizing Provider: NOE ALAMO  Ordering User: VON LIMON    Forwarding to Dr. Alamo for prednisone recommendations for Maurice.    Von Limon RN, BSN  [facility] Neurology    "

## 2025-07-28 ENCOUNTER — TELEPHONE (OUTPATIENT)
Dept: NEUROLOGY | Facility: CLINIC | Age: 70
End: 2025-07-28
Payer: COMMERCIAL

## 2025-08-11 ENCOUNTER — OFFICE VISIT (OUTPATIENT)
Dept: NEUROLOGY | Facility: CLINIC | Age: 70
End: 2025-08-11
Attending: PSYCHIATRY & NEUROLOGY
Payer: COMMERCIAL

## 2025-08-11 ENCOUNTER — OFFICE VISIT (OUTPATIENT)
Dept: NEUROLOGY | Facility: CLINIC | Age: 70
End: 2025-08-11
Payer: COMMERCIAL

## 2025-08-11 ENCOUNTER — LAB (OUTPATIENT)
Dept: LAB | Facility: HOSPITAL | Age: 70
End: 2025-08-11
Payer: COMMERCIAL

## 2025-08-11 VITALS
HEIGHT: 68 IN | DIASTOLIC BLOOD PRESSURE: 74 MMHG | HEART RATE: 58 BPM | WEIGHT: 181 LBS | SYSTOLIC BLOOD PRESSURE: 129 MMHG | BODY MASS INDEX: 27.43 KG/M2

## 2025-08-11 DIAGNOSIS — E53.8 VITAMIN B12 DEFICIENCY (NON ANEMIC): ICD-10-CM

## 2025-08-11 DIAGNOSIS — G62.9 NEUROPATHY: Primary | ICD-10-CM

## 2025-08-11 DIAGNOSIS — G62.9 NEUROPATHY: ICD-10-CM

## 2025-08-11 DIAGNOSIS — G70.00 MYASTHENIA GRAVIS (H): ICD-10-CM

## 2025-08-11 LAB — VIT B12 SERPL-MCNC: 472 PG/ML (ref 232–1245)

## 2025-08-11 PROCEDURE — 86235 NUCLEAR ANTIGEN ANTIBODY: CPT

## 2025-08-11 PROCEDURE — 99215 OFFICE O/P EST HI 40 MIN: CPT | Performed by: PSYCHIATRY & NEUROLOGY

## 2025-08-11 PROCEDURE — 3078F DIAST BP <80 MM HG: CPT | Performed by: PSYCHIATRY & NEUROLOGY

## 2025-08-11 PROCEDURE — 82607 VITAMIN B-12: CPT

## 2025-08-11 PROCEDURE — 86334 IMMUNOFIX E-PHORESIS SERUM: CPT | Mod: 26 | Performed by: PATHOLOGY

## 2025-08-11 PROCEDURE — 86618 LYME DISEASE ANTIBODY: CPT

## 2025-08-11 PROCEDURE — 36415 COLL VENOUS BLD VENIPUNCTURE: CPT

## 2025-08-11 PROCEDURE — G2211 COMPLEX E/M VISIT ADD ON: HCPCS | Performed by: PSYCHIATRY & NEUROLOGY

## 2025-08-11 PROCEDURE — 86038 ANTINUCLEAR ANTIBODIES: CPT

## 2025-08-11 PROCEDURE — 99207 PR NO CHARGE NURSE ONLY: CPT | Performed by: PSYCHIATRY & NEUROLOGY

## 2025-08-11 PROCEDURE — 83516 IMMUNOASSAY NONANTIBODY: CPT

## 2025-08-11 PROCEDURE — 95886 MUSC TEST DONE W/N TEST COMP: CPT | Performed by: PSYCHIATRY & NEUROLOGY

## 2025-08-11 PROCEDURE — 86334 IMMUNOFIX E-PHORESIS SERUM: CPT | Performed by: PATHOLOGY

## 2025-08-11 PROCEDURE — 3074F SYST BP LT 130 MM HG: CPT | Performed by: PSYCHIATRY & NEUROLOGY

## 2025-08-11 PROCEDURE — 95912 NRV CNDJ TEST 11-12 STUDIES: CPT | Performed by: PSYCHIATRY & NEUROLOGY

## 2025-08-11 RX ORDER — MYCOPHENOLATE MOFETIL 500 MG/1
750 TABLET ORAL 2 TIMES DAILY
Qty: 270 TABLET | Refills: 3 | Status: SHIPPED | OUTPATIENT
Start: 2025-08-11

## 2025-08-12 LAB
B BURGDOR IGG+IGM SER QL: 0.05
ENA SS-A AB SER IA-ACNC: <0.5 U/ML
ENA SS-A AB SER IA-ACNC: NEGATIVE
ENA SS-B IGG SER IA-ACNC: <0.6 U/ML
ENA SS-B IGG SER IA-ACNC: NEGATIVE
LOCATION OF TASK: NORMAL
PROT PATTERN SERPL IFE-IMP: NORMAL

## 2025-08-13 LAB
ANA SER QL IF: NEGATIVE
MAG IGM SER IA-ACNC: <1000 TU
SGPG IGM SER-ACNC: 0.22 IV

## 2025-08-14 LAB
GM1 GANGL IGG SER IA-ACNC: 4 IV
GM1 GANGL IGM SER IA-ACNC: 7 IV

## (undated) DEVICE — DRSG GAUZE 4X4" 3033

## (undated) DEVICE — CUSTOM PACK UPPER EXTREMITY SOP5BUEHEC

## (undated) DEVICE — TUBING IRRIG TUR Y TYPE 96" LF 6543-01

## (undated) DEVICE — GLOVE UNDER INDICATOR PI SZ 7.0 LF 41670

## (undated) DEVICE — SOL WATER IRRIG 1000ML BOTTLE 2F7114

## (undated) DEVICE — BNDG ELASTIC 3"X5YDS UNSTERILE 6611-30

## (undated) DEVICE — CUFF TOURN 18IN STRL DISP

## (undated) DEVICE — SYR 10ML LL W/O NDL 302995

## (undated) DEVICE — SOL NACL 0.9% IRRIG 1000ML BOTTLE 2F7124

## (undated) DEVICE — SOL ISOPROPYL RUBBING ALCOHOL USP 70% 4OZ HDX-20 I0020

## (undated) DEVICE — SU ETHILON 4-0 P-3 18" BLACK 699G

## (undated) DEVICE — SPLINT ORTH FBGLS ORTHO-GLASS 3INX12IN OG-3PC

## (undated) DEVICE — ESU CORD BIPOLAR 12' E0512

## (undated) DEVICE — DRSG XEROFORM 1X8"

## (undated) DEVICE — GLOVE SURG PI ULTRA TOUCH M SZ 7 LF 42670

## (undated) DEVICE — NDL 27GA 1 1/2" 1188827112

## (undated) DEVICE — GOWN LG DISP 9515

## (undated) DEVICE — PREP DYNA-HEX 4% CHG SCRUB 4OZ BOTTLE MDS098710

## (undated) DEVICE — DRAPE STERI 23X17 NON STERILE 1010NSD

## (undated) DEVICE — PLATE GROUNDING ADULT W/CORD 9165L

## (undated) RX ORDER — GLYCOPYRROLATE 0.2 MG/ML
INJECTION, SOLUTION INTRAMUSCULAR; INTRAVENOUS
Status: DISPENSED
Start: 2023-11-12

## (undated) RX ORDER — LIDOCAINE HYDROCHLORIDE 10 MG/ML
INJECTION, SOLUTION INFILTRATION; PERINEURAL
Status: DISPENSED
Start: 2023-08-31

## (undated) RX ORDER — PROPOFOL 10 MG/ML
INJECTION, EMULSION INTRAVENOUS
Status: DISPENSED
Start: 2023-11-12

## (undated) RX ORDER — LIDOCAINE HYDROCHLORIDE 10 MG/ML
INJECTION, SOLUTION EPIDURAL; INFILTRATION; INTRACAUDAL; PERINEURAL
Status: DISPENSED
Start: 2023-11-12

## (undated) RX ORDER — FENTANYL CITRATE 50 UG/ML
INJECTION, SOLUTION INTRAMUSCULAR; INTRAVENOUS
Status: DISPENSED
Start: 2023-11-12

## (undated) RX ORDER — ONDANSETRON 2 MG/ML
INJECTION INTRAMUSCULAR; INTRAVENOUS
Status: DISPENSED
Start: 2023-11-12

## (undated) RX ORDER — HEPARIN SODIUM 1000 [USP'U]/ML
INJECTION, SOLUTION INTRAVENOUS; SUBCUTANEOUS
Status: DISPENSED
Start: 2023-08-31